# Patient Record
Sex: FEMALE | Race: WHITE | NOT HISPANIC OR LATINO | Employment: OTHER | URBAN - NONMETROPOLITAN AREA
[De-identification: names, ages, dates, MRNs, and addresses within clinical notes are randomized per-mention and may not be internally consistent; named-entity substitution may affect disease eponyms.]

---

## 2017-10-20 ENCOUNTER — ANESTHESIA EVENT (OUTPATIENT)
Dept: PERIOP | Facility: HOSPITAL | Age: 62
End: 2017-10-20

## 2017-10-20 ENCOUNTER — HOSPITAL ENCOUNTER (EMERGENCY)
Facility: HOSPITAL | Age: 62
Discharge: HOME | End: 2017-10-20
Attending: INTERNAL MEDICINE | Admitting: INTERNAL MEDICINE

## 2017-10-20 ENCOUNTER — APPOINTMENT (OUTPATIENT)
Dept: GENERAL RADIOLOGY | Facility: HOSPITAL | Age: 62
End: 2017-10-20

## 2017-10-20 ENCOUNTER — ANESTHESIA (OUTPATIENT)
Dept: PERIOP | Facility: HOSPITAL | Age: 62
End: 2017-10-20

## 2017-10-20 VITALS
SYSTOLIC BLOOD PRESSURE: 110 MMHG | BODY MASS INDEX: 19.33 KG/M2 | HEART RATE: 80 BPM | TEMPERATURE: 98.1 F | HEIGHT: 65 IN | RESPIRATION RATE: 16 BRPM | WEIGHT: 116 LBS | OXYGEN SATURATION: 95 % | DIASTOLIC BLOOD PRESSURE: 80 MMHG

## 2017-10-20 DIAGNOSIS — T18.128A FOOD IMPACTION OF ESOPHAGUS, INITIAL ENCOUNTER: Primary | ICD-10-CM

## 2017-10-20 LAB
ALBUMIN SERPL-MCNC: 4.6 G/DL (ref 3.5–5)
ALBUMIN/GLOB SERPL: 1.4 G/DL (ref 1.1–2.5)
ALP SERPL-CCNC: 110 U/L (ref 24–120)
ALT SERPL W P-5'-P-CCNC: 34 U/L (ref 0–54)
ANION GAP SERPL CALCULATED.3IONS-SCNC: 16 MMOL/L (ref 4–13)
AST SERPL-CCNC: 30 U/L (ref 7–45)
BASOPHILS # BLD AUTO: 0.12 10*3/MM3 (ref 0–0.2)
BASOPHILS NFR BLD AUTO: 0.8 % (ref 0–2)
BILIRUB SERPL-MCNC: 0.2 MG/DL (ref 0.1–1)
BUN BLD-MCNC: 14 MG/DL (ref 5–21)
BUN/CREAT SERPL: 17.5 (ref 7–25)
CALCIUM SPEC-SCNC: 8.9 MG/DL (ref 8.4–10.4)
CHLORIDE SERPL-SCNC: 102 MMOL/L (ref 98–110)
CO2 SERPL-SCNC: 25 MMOL/L (ref 24–31)
CREAT BLD-MCNC: 0.8 MG/DL (ref 0.5–1.4)
DEPRECATED RDW RBC AUTO: 51.5 FL (ref 40–54)
EOSINOPHIL # BLD AUTO: 0.26 10*3/MM3 (ref 0–0.7)
EOSINOPHIL NFR BLD AUTO: 1.7 % (ref 0–4)
ERYTHROCYTE [DISTWIDTH] IN BLOOD BY AUTOMATED COUNT: 15.3 % (ref 12–15)
GFR SERPL CREATININE-BSD FRML MDRD: 73 ML/MIN/1.73
GLOBULIN UR ELPH-MCNC: 3.4 GM/DL
GLUCOSE BLD-MCNC: 103 MG/DL (ref 70–100)
HCT VFR BLD AUTO: 37.1 % (ref 37–47)
HGB BLD-MCNC: 12 G/DL (ref 12–16)
IMM GRANULOCYTES # BLD: 0.05 10*3/MM3 (ref 0–0.03)
IMM GRANULOCYTES NFR BLD: 0.3 % (ref 0–5)
LYMPHOCYTES # BLD AUTO: 4.82 10*3/MM3 (ref 0.72–4.86)
LYMPHOCYTES NFR BLD AUTO: 31.1 % (ref 15–45)
MCH RBC QN AUTO: 29.7 PG (ref 28–32)
MCHC RBC AUTO-ENTMCNC: 32.3 G/DL (ref 33–36)
MCV RBC AUTO: 91.8 FL (ref 82–98)
MONOCYTES # BLD AUTO: 1.15 10*3/MM3 (ref 0.19–1.3)
MONOCYTES NFR BLD AUTO: 7.4 % (ref 4–12)
NEUTROPHILS # BLD AUTO: 9.09 10*3/MM3 (ref 1.87–8.4)
NEUTROPHILS NFR BLD AUTO: 58.7 % (ref 39–78)
NRBC BLD MANUAL-RTO: 0 /100 WBC (ref 0–0)
PLATELET # BLD AUTO: 685 10*3/MM3 (ref 130–400)
PMV BLD AUTO: 9.5 FL (ref 6–12)
POTASSIUM BLD-SCNC: 4 MMOL/L (ref 3.5–5.3)
PROT SERPL-MCNC: 8 G/DL (ref 6.3–8.7)
RBC # BLD AUTO: 4.04 10*6/MM3 (ref 4.2–5.4)
RBC MORPH BLD: NORMAL
SMALL PLATELETS BLD QL SMEAR: NORMAL
SODIUM BLD-SCNC: 143 MMOL/L (ref 135–145)
WBC MORPH BLD: NORMAL
WBC NRBC COR # BLD: 15.49 10*3/MM3 (ref 4.8–10.8)

## 2017-10-20 PROCEDURE — 25010000002 MIDAZOLAM PER 1 MG: Performed by: ANESTHESIOLOGY

## 2017-10-20 PROCEDURE — 25010000002 GLUCAGON (HUMAN RECOMBINANT) 1 MG RECONSTITUTED SOLUTION: Performed by: PHYSICIAN ASSISTANT

## 2017-10-20 PROCEDURE — 85007 BL SMEAR W/DIFF WBC COUNT: CPT | Performed by: EMERGENCY MEDICINE

## 2017-10-20 PROCEDURE — 25010000002 FENTANYL CITRATE (PF) 100 MCG/2ML SOLUTION: Performed by: NURSE ANESTHETIST, CERTIFIED REGISTERED

## 2017-10-20 PROCEDURE — 43247 EGD REMOVE FOREIGN BODY: CPT | Performed by: INTERNAL MEDICINE

## 2017-10-20 PROCEDURE — 96374 THER/PROPH/DIAG INJ IV PUSH: CPT

## 2017-10-20 PROCEDURE — 85025 COMPLETE CBC W/AUTO DIFF WBC: CPT | Performed by: EMERGENCY MEDICINE

## 2017-10-20 PROCEDURE — 25010000002 PROPOFOL 10 MG/ML EMULSION: Performed by: NURSE ANESTHETIST, CERTIFIED REGISTERED

## 2017-10-20 PROCEDURE — 80053 COMPREHEN METABOLIC PANEL: CPT | Performed by: EMERGENCY MEDICINE

## 2017-10-20 PROCEDURE — 25010000002 DIAZEPAM PER 5 MG: Performed by: EMERGENCY MEDICINE

## 2017-10-20 PROCEDURE — 96375 TX/PRO/DX INJ NEW DRUG ADDON: CPT

## 2017-10-20 PROCEDURE — 25010000002 ONDANSETRON PER 1 MG: Performed by: EMERGENCY MEDICINE

## 2017-10-20 PROCEDURE — 25010000002 SUCCINYLCHOLINE PER 20 MG: Performed by: NURSE ANESTHETIST, CERTIFIED REGISTERED

## 2017-10-20 PROCEDURE — 99283 EMERGENCY DEPT VISIT LOW MDM: CPT

## 2017-10-20 PROCEDURE — 71010 HC CHEST PA OR AP: CPT

## 2017-10-20 RX ORDER — ONDANSETRON 2 MG/ML
4 INJECTION INTRAMUSCULAR; INTRAVENOUS AS NEEDED
Status: DISCONTINUED | OUTPATIENT
Start: 2017-10-20 | End: 2017-10-20 | Stop reason: HOSPADM

## 2017-10-20 RX ORDER — ONDANSETRON 2 MG/ML
4 INJECTION INTRAMUSCULAR; INTRAVENOUS EVERY 6 HOURS PRN
Status: DISCONTINUED | OUTPATIENT
Start: 2017-10-20 | End: 2017-10-20 | Stop reason: HOSPADM

## 2017-10-20 RX ORDER — FENTANYL CITRATE 50 UG/ML
INJECTION, SOLUTION INTRAMUSCULAR; INTRAVENOUS AS NEEDED
Status: DISCONTINUED | OUTPATIENT
Start: 2017-10-20 | End: 2017-10-20 | Stop reason: SURG

## 2017-10-20 RX ORDER — LIDOCAINE HYDROCHLORIDE 20 MG/ML
INJECTION, SOLUTION INFILTRATION; PERINEURAL AS NEEDED
Status: DISCONTINUED | OUTPATIENT
Start: 2017-10-20 | End: 2017-10-20 | Stop reason: SURG

## 2017-10-20 RX ORDER — SODIUM CHLORIDE, SODIUM LACTATE, POTASSIUM CHLORIDE, CALCIUM CHLORIDE 600; 310; 30; 20 MG/100ML; MG/100ML; MG/100ML; MG/100ML
100 INJECTION, SOLUTION INTRAVENOUS CONTINUOUS
Status: DISCONTINUED | OUTPATIENT
Start: 2017-10-20 | End: 2017-10-20 | Stop reason: HOSPADM

## 2017-10-20 RX ORDER — HYDRALAZINE HYDROCHLORIDE 20 MG/ML
5 INJECTION INTRAMUSCULAR; INTRAVENOUS
Status: DISCONTINUED | OUTPATIENT
Start: 2017-10-20 | End: 2017-10-20 | Stop reason: HOSPADM

## 2017-10-20 RX ORDER — LABETALOL HYDROCHLORIDE 5 MG/ML
5 INJECTION, SOLUTION INTRAVENOUS
Status: DISCONTINUED | OUTPATIENT
Start: 2017-10-20 | End: 2017-10-20 | Stop reason: HOSPADM

## 2017-10-20 RX ORDER — MEPERIDINE HYDROCHLORIDE 25 MG/ML
12.5 INJECTION INTRAMUSCULAR; INTRAVENOUS; SUBCUTANEOUS
Status: DISCONTINUED | OUTPATIENT
Start: 2017-10-20 | End: 2017-10-20 | Stop reason: HOSPADM

## 2017-10-20 RX ORDER — IPRATROPIUM BROMIDE AND ALBUTEROL SULFATE 2.5; .5 MG/3ML; MG/3ML
3 SOLUTION RESPIRATORY (INHALATION) ONCE AS NEEDED
Status: DISCONTINUED | OUTPATIENT
Start: 2017-10-20 | End: 2017-10-20 | Stop reason: HOSPADM

## 2017-10-20 RX ORDER — METOCLOPRAMIDE HYDROCHLORIDE 5 MG/ML
5 INJECTION INTRAMUSCULAR; INTRAVENOUS
Status: DISCONTINUED | OUTPATIENT
Start: 2017-10-20 | End: 2017-10-20 | Stop reason: HOSPADM

## 2017-10-20 RX ORDER — FLUMAZENIL 0.1 MG/ML
0.2 INJECTION INTRAVENOUS AS NEEDED
Status: DISCONTINUED | OUTPATIENT
Start: 2017-10-20 | End: 2017-10-20 | Stop reason: HOSPADM

## 2017-10-20 RX ORDER — DIAZEPAM 5 MG/ML
5 INJECTION, SOLUTION INTRAMUSCULAR; INTRAVENOUS ONCE
Status: COMPLETED | OUTPATIENT
Start: 2017-10-20 | End: 2017-10-20

## 2017-10-20 RX ORDER — PROPOFOL 10 MG/ML
VIAL (ML) INTRAVENOUS AS NEEDED
Status: DISCONTINUED | OUTPATIENT
Start: 2017-10-20 | End: 2017-10-20 | Stop reason: SURG

## 2017-10-20 RX ORDER — SUCCINYLCHOLINE CHLORIDE 20 MG/ML
INJECTION INTRAMUSCULAR; INTRAVENOUS AS NEEDED
Status: DISCONTINUED | OUTPATIENT
Start: 2017-10-20 | End: 2017-10-20 | Stop reason: SURG

## 2017-10-20 RX ORDER — MIDAZOLAM HYDROCHLORIDE 1 MG/ML
1 INJECTION INTRAMUSCULAR; INTRAVENOUS
Status: DISCONTINUED | OUTPATIENT
Start: 2017-10-20 | End: 2017-10-20 | Stop reason: HOSPADM

## 2017-10-20 RX ORDER — NALOXONE HCL 0.4 MG/ML
0.04 VIAL (ML) INJECTION AS NEEDED
Status: DISCONTINUED | OUTPATIENT
Start: 2017-10-20 | End: 2017-10-20 | Stop reason: HOSPADM

## 2017-10-20 RX ORDER — SODIUM CHLORIDE 9 MG/ML
125 INJECTION, SOLUTION INTRAVENOUS CONTINUOUS
Status: DISCONTINUED | OUTPATIENT
Start: 2017-10-20 | End: 2017-10-20 | Stop reason: HOSPADM

## 2017-10-20 RX ORDER — MIDAZOLAM HYDROCHLORIDE 1 MG/ML
2 INJECTION INTRAMUSCULAR; INTRAVENOUS
Status: DISCONTINUED | OUTPATIENT
Start: 2017-10-20 | End: 2017-10-20 | Stop reason: HOSPADM

## 2017-10-20 RX ORDER — ROCURONIUM BROMIDE 10 MG/ML
INJECTION, SOLUTION INTRAVENOUS AS NEEDED
Status: DISCONTINUED | OUTPATIENT
Start: 2017-10-20 | End: 2017-10-20 | Stop reason: SURG

## 2017-10-20 RX ORDER — MORPHINE SULFATE 2 MG/ML
2 INJECTION, SOLUTION INTRAMUSCULAR; INTRAVENOUS AS NEEDED
Status: DISCONTINUED | OUTPATIENT
Start: 2017-10-20 | End: 2017-10-20 | Stop reason: HOSPADM

## 2017-10-20 RX ORDER — SODIUM CHLORIDE 0.9 % (FLUSH) 0.9 %
1-10 SYRINGE (ML) INJECTION AS NEEDED
Status: DISCONTINUED | OUTPATIENT
Start: 2017-10-20 | End: 2017-10-20 | Stop reason: HOSPADM

## 2017-10-20 RX ADMIN — LIDOCAINE HYDROCHLORIDE 100 MG: 20 INJECTION, SOLUTION INFILTRATION; PERINEURAL at 16:06

## 2017-10-20 RX ADMIN — MIDAZOLAM HYDROCHLORIDE 2 MG: 1 INJECTION, SOLUTION INTRAMUSCULAR; INTRAVENOUS at 15:54

## 2017-10-20 RX ADMIN — ROCURONIUM BROMIDE 5 MG: 10 INJECTION INTRAVENOUS at 16:06

## 2017-10-20 RX ADMIN — SODIUM CHLORIDE, POTASSIUM CHLORIDE, SODIUM LACTATE AND CALCIUM CHLORIDE 100 ML/HR: 600; 310; 30; 20 INJECTION, SOLUTION INTRAVENOUS at 15:20

## 2017-10-20 RX ADMIN — ONDANSETRON 4 MG: 2 INJECTION INTRAMUSCULAR; INTRAVENOUS at 14:42

## 2017-10-20 RX ADMIN — GLUCAGON HYDROCHLORIDE 1 MG: 1 INJECTION, POWDER, FOR SOLUTION INTRAMUSCULAR; INTRAVENOUS; SUBCUTANEOUS at 14:46

## 2017-10-20 RX ADMIN — DIAZEPAM 5 MG: 5 INJECTION, SOLUTION INTRAMUSCULAR; INTRAVENOUS at 14:46

## 2017-10-20 RX ADMIN — SODIUM CHLORIDE 125 ML/HR: 9 INJECTION, SOLUTION INTRAVENOUS at 14:41

## 2017-10-20 RX ADMIN — SUCCINYLCHOLINE CHLORIDE 120 MG: 20 INJECTION, SOLUTION INTRAMUSCULAR; INTRAVENOUS at 16:06

## 2017-10-20 RX ADMIN — PROPOFOL 140 MG: 10 INJECTION, EMULSION INTRAVENOUS at 16:06

## 2017-10-20 RX ADMIN — FENTANYL CITRATE 100 MCG: 50 INJECTION, SOLUTION INTRAMUSCULAR; INTRAVENOUS at 16:06

## 2017-10-20 NOTE — H&P
"[facility] Gastroenterology  Pre Procedure History & Physical    Chief Complaint:   Dysphagia    Subjective     HPI:   Patient here today with complaints of dysphagia.  A some chicken earlier in the day and has been unable to swallow since that time.  Has not had prior episodes of dysphagia.  Here for urgent endoscopy and foreign body removal    Past Medical History:   No past medical history on file.    Past Surgical History:  [unfilled]    Family History:  No family history on file.    Social History:       Medications:   Prior to Admission medications    Not on File       Allergies:  Review of patient's allergies indicates no known allergies.    Objective     Blood pressure 141/89, pulse 104, temperature 98.3 °F (36.8 °C), temperature source Oral, resp. rate 18, height 65\" (165.1 cm), weight 116 lb (52.6 kg), SpO2 100 %.    Physical Exam   Constitutional: Pt is oriented to person, place, and in no distress.   HENT: Mouth/Throat: Oropharynx is clear.   Cardiovascular: Normal rate, regular rhythm.    Pulmonary/Chest: Effort normal. No respiratory distress. No  wheezes.   Abdominal: Soft. Non-distended.  Skin: Skin is warm and dry.   Psychiatric: Mood, memory, affect and judgment appear normal.     Assessment/Plan     Diagnosis:  Dysphagia related to foreign body    Anticipated Surgical Procedure:    Proceed with urgent endoscopy    The following major R/B/A were discussed with the patient, however the list is not all inclusive . Risk:  Bleeding (immediate and delayed), perforation (rupture or tear), reaction to medication, missed lesion/cancer, pain during the procedure, infection, need for surgery, need for ostomy, need for mechanical ventilation (breathing machine), death.  Benefits: removal of polyp/tissue, burn/clip/or inject to stop bleeding, removal of foreign body, dilate any stricture.  Alternatives: Xray or CT, surgery, do nothing with associated risk   The patient was given time to ask question and " received explanation, and agrees to proceed as per History and Physical.   No guarantee given or expressed.    EMR Dragon/transcription disclaimer: Much of this encounter note is an electronic transcription/translation of spoken language to printed text.  The electronic translation of spoken language may permit erroneous, or at times, nonsensical words or phrases to be inadvertently transcribed.  Although I have reviewed the note for such errors, some may still exist.    Femi Goddard MD  3:49 PM  10/20/2017

## 2017-10-20 NOTE — ED PROVIDER NOTES
"Subjective   Patient is a 62 y.o. female presenting with foreign body.   Foreign Body   Associated symptoms: choking, cough and sore throat      Patient is a 62-year-old  female chief complaint of \"chicken stuck in my throat.\"  The patient describes eating a chicken 10 minutes prior to ER arrival.  She ate a piece of chicken at work when it suddenly got stuck.  She did try to benedicto it with Water but vomited right back up.  She has been coughing ever since.  She does have a history of COPD but does not wear any home oxygenation.  She denies any history of food impaction or esophageal strictures in the past.  She describes she is otherwise healthy.    Review of Systems   Constitutional: Positive for activity change and appetite change.   HENT: Positive for sore throat.    Respiratory: Positive for cough and choking. Negative for shortness of breath.    Cardiovascular: Negative.    Genitourinary: Negative.    Musculoskeletal: Negative.    Neurological: Negative.    Psychiatric/Behavioral: Negative.        No past medical history on file.    No Known Allergies    No past surgical history on file.    No family history on file.    Social History     Social History   • Marital status: Single     Spouse name: N/A   • Number of children: N/A   • Years of education: N/A     Social History Main Topics   • Smoking status: Not on file   • Smokeless tobacco: Not on file   • Alcohol use Not on file   • Drug use: Not on file   • Sexual activity: Not on file      Comment: recieved partient from the ED dept . No history with chart      Other Topics Concern   • Not on file     Social History Narrative   • No narrative on file       Prior to Admission medications    Not on File       Medications   ondansetron (ZOFRAN) injection 4 mg ( Intravenous MAR Hold 10/20/17 1516)   sodium chloride 0.9 % infusion (125 mL/hr Intravenous New Bag 10/20/17 1441)   lactated ringers infusion (100 mL/hr Intravenous New Bag 10/20/17 1520) " "  Morphine sulfate (PF) injection 2 mg (not administered)   HYDROmorphone (DILAUDID) injection 0.5 mg (not administered)   labetalol (NORMODYNE,TRANDATE) injection 5 mg (not administered)   hydrALAZINE (APRESOLINE) injection 5 mg (not administered)   atropine sulfate injection 0.5 mg (not administered)   ipratropium-albuterol (DUO-NEB) nebulizer solution 3 mL (not administered)   naloxone (NARCAN) injection 0.04 mg (not administered)   flumazenil (ROMAZICON) injection 0.2 mg (not administered)   ondansetron (ZOFRAN) injection 4 mg (not administered)   metoclopramide (REGLAN) injection 5 mg (not administered)   meperidine (DEMEROL) injection 12.5 mg (not administered)   diazePAM (VALIUM) injection 5 mg (5 mg Intravenous Given 10/20/17 1446)   glucagon (human recombinant) (GLUCAGEN DIAGNOSTIC) injection 1 mg (1 mg Intravenous Given 10/20/17 1446)       /61  Pulse 90  Temp 98 °F (36.7 °C)  Resp 16  Ht 65\" (165.1 cm)  Wt 116 lb (52.6 kg)  SpO2 100%  BMI 19.3 kg/m2      Objective   Physical Exam   Constitutional: She is oriented to person, place, and time. She appears well-developed and well-nourished.  Non-toxic appearance. No distress.   HENT:   Head: Normocephalic and atraumatic.   Nose: Nose normal.   Mouth/Throat: Uvula is midline, oropharynx is clear and moist and mucous membranes are normal.   Eyes: Conjunctivae, EOM and lids are normal. Pupils are equal, round, and reactive to light. Lids are everted and swept, no foreign bodies found.   Neck: Trachea normal, normal range of motion, full passive range of motion without pain and phonation normal. Neck supple. Normal carotid pulses and no JVD present. Carotid bruit is not present. No rigidity.   Cardiovascular: Normal rate, regular rhythm, normal heart sounds, intact distal pulses and normal pulses.    Pulmonary/Chest: Effort normal. No stridor. No apnea and no tachypnea. No respiratory distress. She has wheezes. She has rales.   Abdominal: Soft. " Normal appearance, normal aorta and bowel sounds are normal. There is no hepatosplenomegaly. There is no tenderness. No hernia.   Musculoskeletal: Normal range of motion.       Vascular Status -  Her exam exhibits right foot vasculature normal. Her exam exhibits no right foot edema. Her exam exhibits left foot vasculature normal. Her exam exhibits no left foot edema.  Neurological: She is alert and oriented to person, place, and time. She has normal strength and normal reflexes. She displays normal reflexes. No cranial nerve deficit or sensory deficit. Coordination and gait normal. GCS eye subscore is 4. GCS verbal subscore is 5. GCS motor subscore is 6.   Skin: Skin is warm, dry and intact. She is not diaphoretic. No pallor.   Psychiatric: She has a normal mood and affect. Her speech is normal and behavior is normal.   Nursing note and vitals reviewed.      Procedures         Lab Results (last 24 hours)     Procedure Component Value Units Date/Time    CBC & Differential [[id number]] Collected:  10/20/17 1438    Specimen:  Blood Updated:  10/20/17 1541    Narrative:       The following orders were created for panel order CBC & Differential.  Procedure                               Abnormality         Status                     ---------                               -----------         ------                     Scan Slide[[id number]]                                       Final result               CBC Auto Differential[[id number]]         Abnormal            Final result                 Please view results for these tests on the individual orders.    CBC Auto Differential [[id number]]  (Abnormal) Collected:  10/20/17 1438    Specimen:  Blood Updated:  10/20/17 1541     WBC 15.49 (H) 10*3/mm3      RBC 4.04 (L) 10*6/mm3      Hemoglobin 12.0 g/dL      Hematocrit 37.1 %      MCV 91.8 fL      MCH 29.7 pg      MCHC 32.3 (L) g/dL      RDW 15.3 (H) %      RDW-SD 51.5 fl      MPV 9.5 fL      Platelets 685 (H) 10*3/mm3       Neutrophil % 58.7 %      Lymphocyte % 31.1 %      Monocyte % 7.4 %      Eosinophil % 1.7 %      Basophil % 0.8 %      Immature Grans % 0.3 %      Neutrophils, Absolute 9.09 (H) 10*3/mm3      Lymphocytes, Absolute 4.82 10*3/mm3      Monocytes, Absolute 1.15 10*3/mm3      Eosinophils, Absolute 0.26 10*3/mm3      Basophils, Absolute 0.12 10*3/mm3      Immature Grans, Absolute 0.05 (H) 10*3/mm3      nRBC 0.0 /100 WBC     Scan Slide [[id number]] Collected:  10/20/17 1438    Specimen:  Blood Updated:  10/20/17 1541     RBC Morphology Normal     WBC Morphology Normal     Platelet Estimate Increased    Comprehensive Metabolic Panel [[id number]]  (Abnormal) Collected:  10/20/17 1439    Specimen:  Blood Updated:  10/20/17 1504     Glucose 103 (H) mg/dL      BUN 14 mg/dL      Creatinine 0.80 mg/dL      Sodium 143 mmol/L      Potassium 4.0 mmol/L      Chloride 102 mmol/L      CO2 25.0 mmol/L      Calcium 8.9 mg/dL      Total Protein 8.0 g/dL      Albumin 4.60 g/dL      ALT (SGPT) 34 U/L      AST (SGOT) 30 U/L      Alkaline Phosphatase 110 U/L      Total Bilirubin 0.2 mg/dL      eGFR Non African Amer 73 mL/min/1.73      Globulin 3.4 gm/dL      A/G Ratio 1.4 g/dL      BUN/Creatinine Ratio 17.5     Anion Gap 16.0 (H) mmol/L           Xr Chest 1 View    Result Date: 10/20/2017  Narrative: History: 62-year-old female evaluated for food bolus.  Reference: Chest radiograph November 2015  Findings: Frontal chest radiograph performed.  Nodular opacities over both lung bases were present previously and represent nipple shadows. Lungs are felt to be clear. No pleural effusion or pneumothorax. Heart and mediastinum unremarkable. No acute osseous findings.       Impression: Impression: No acute findings. This report was finalized on 10/20/2017 15:02 by  Brendan Edmondson, .      ED Course  ED Course        I reviewed this case immediately with Dr. Mi and called JUAN FRANCISCO Hoyos on call. I have spoken with Kori who will speak with Dr. Goddard  and the endoscopy team . She requests consent be obtained for endoscopy with mac. I have updated patient on plan of care.     MDM    Final diagnoses:   Food impaction of esophagus, initial encounter          Thu-CHERYL Montalvo  10/20/17 9961

## 2017-10-20 NOTE — DISCHARGE INSTRUCTIONS

## 2017-10-20 NOTE — ANESTHESIA PREPROCEDURE EVALUATION
Anesthesia Evaluation     no history of anesthetic complications:  NPO Solid Status: Waived due to emergency  NPO Liquid Status: Waived due to emergency     Airway   Mallampati: II  TM distance: >3 FB  Neck ROM: full  no difficulty expected  Dental    (+) poor dentition        Pulmonary - normal exam    breath sounds clear to auscultation  (+) a smoker (1 ppd) Current, COPD,   (-) asthma, recent URI, sleep apnea  Cardiovascular   Exercise tolerance: good (4-7 METS)    Rhythm: regular  Rate: normal    (-) pacemaker, hypertension, past MI, angina, cardiac stents, CABG      Neuro/Psych  (-) seizures, TIA, CVA  GI/Hepatic/Renal/Endo    (+)  GERD,   (-) liver disease, no renal disease, diabetes, hypothyroidism, hyperthyroidism    Musculoskeletal     Abdominal    Substance History      OB/GYN          Other                                        Anesthesia Plan    ASA 3     general     intravenous induction   Anesthetic plan and risks discussed with patient.

## 2017-10-20 NOTE — NURSING NOTE
Called the patient place of employment [facility] [employer] and spoke with manager whom confirmed that  Kori Fonseca will be here to give patient a ride home

## 2017-10-20 NOTE — ANESTHESIA PROCEDURE NOTES
Airway  Urgency: elective    Airway not difficult    General Information and Staff    Patient location during procedure: OR  CRNA: ASHLI PINEDA    Indications and Patient Condition  Indications for airway management: airway protection    Preoxygenated: yes  Mask difficulty assessment: 1 - vent by mask    Final Airway Details  Final airway type: endotracheal airway      Successful airway: ETT  Cuffed: yes   Successful intubation technique: direct laryngoscopy  Facilitating devices/methods: intubating stylet  Endotracheal tube insertion site: oral  Blade: Riggs  Blade size: #2  ETT size: 7.5 mm  Cormack-Lehane Classification: grade IIa - partial view of glottis  Placement verified by: chest auscultation and capnometry   Cuff volume (mL): 8  Measured from: lips  Number of attempts at approach: 1    Additional Comments  Atraumatic intubation

## 2017-10-22 NOTE — ANESTHESIA POSTPROCEDURE EVALUATION
"Patient: Tea Gloster    Procedure Summary     Date Anesthesia Start Anesthesia Stop Room / Location    10/20/17 9690 1640  [facility] OR 03 / [facility] OR       Procedure Diagnosis Surgeon Provider    ESOPHAGOGASTRODUODENOSCOPY WITH ANESTHESIA (N/A Esophagus) No diagnosis on file. MD Yung Camacho CRNA          Anesthesia Type: general  Last vitals  BP   110/80 (10/20/17 1816)   Temp   98.1 °F (36.7 °C) (10/20/17 1732)   Pulse   80 (10/20/17 1816)   Resp   16 (10/20/17 1816)     SpO2   95 % (10/20/17 1816)     Post Anesthesia Care and Evaluation      Comments: Patient discharged from PACU prior to anesthesia evaluation based on Franchesca Score.  For details, see RN note.     /80  Pulse 80  Temp 98.1 °F (36.7 °C) (Temporal Artery )   Resp 16  Ht 65\" (165.1 cm)  Wt 116 lb (52.6 kg)  SpO2 95%  BMI 19.3 kg/m2      "

## 2018-05-26 ENCOUNTER — APPOINTMENT (OUTPATIENT)
Dept: CT IMAGING | Facility: HOSPITAL | Age: 63
End: 2018-05-26

## 2018-05-26 ENCOUNTER — HOSPITAL ENCOUNTER (EMERGENCY)
Facility: HOSPITAL | Age: 63
Discharge: HOME | End: 2018-05-26
Admitting: EMERGENCY MEDICINE

## 2018-05-26 ENCOUNTER — APPOINTMENT (OUTPATIENT)
Dept: GENERAL RADIOLOGY | Facility: HOSPITAL | Age: 63
End: 2018-05-26

## 2018-05-26 VITALS
SYSTOLIC BLOOD PRESSURE: 124 MMHG | OXYGEN SATURATION: 98 % | HEART RATE: 78 BPM | TEMPERATURE: 97.8 F | HEIGHT: 65 IN | RESPIRATION RATE: 16 BRPM | DIASTOLIC BLOOD PRESSURE: 65 MMHG | WEIGHT: 116 LBS | BODY MASS INDEX: 19.33 KG/M2

## 2018-05-26 DIAGNOSIS — S70.01XA CONTUSION OF RIGHT HIP, INITIAL ENCOUNTER: Primary | ICD-10-CM

## 2018-05-26 PROCEDURE — 73502 X-RAY EXAM HIP UNI 2-3 VIEWS: CPT

## 2018-05-26 PROCEDURE — 99283 EMERGENCY DEPT VISIT LOW MDM: CPT

## 2018-05-26 PROCEDURE — 73700 CT LOWER EXTREMITY W/O DYE: CPT

## 2018-05-26 RX ORDER — NAPROXEN 500 MG/1
500 TABLET ORAL EVERY 12 HOURS PRN
Qty: 20 TABLET | Refills: 0 | Status: ON HOLD | OUTPATIENT
Start: 2018-05-26 | End: 2018-11-06

## 2018-05-26 RX ORDER — HYDROCODONE BITARTRATE AND ACETAMINOPHEN 5; 325 MG/1; MG/1
1 TABLET ORAL ONCE
Status: COMPLETED | OUTPATIENT
Start: 2018-05-26 | End: 2018-05-26

## 2018-05-26 RX ORDER — ONDANSETRON 4 MG/1
4 TABLET, ORALLY DISINTEGRATING ORAL ONCE
Status: COMPLETED | OUTPATIENT
Start: 2018-05-26 | End: 2018-05-26

## 2018-05-26 RX ADMIN — ONDANSETRON 4 MG: 4 TABLET, ORALLY DISINTEGRATING ORAL at 09:34

## 2018-05-26 RX ADMIN — HYDROCODONE BITARTRATE AND ACETAMINOPHEN 1 TABLET: 5; 325 TABLET ORAL at 09:34

## 2018-09-16 ENCOUNTER — APPOINTMENT (OUTPATIENT)
Dept: GENERAL RADIOLOGY | Age: 63
DRG: 194 | End: 2018-09-16

## 2018-09-16 ENCOUNTER — HOSPITAL ENCOUNTER (INPATIENT)
Age: 63
LOS: 1 days | Discharge: HOME | DRG: 194 | End: 2018-09-17
Attending: EMERGENCY MEDICINE | Admitting: HOSPITALIST

## 2018-09-16 DIAGNOSIS — J18.9 PNEUMONIA DUE TO ORGANISM: Primary | ICD-10-CM

## 2018-09-16 DIAGNOSIS — J44.1 COPD EXACERBATION (HCC): ICD-10-CM

## 2018-09-16 DIAGNOSIS — R09.02 HYPOXIA: ICD-10-CM

## 2018-09-16 PROBLEM — J44.9 COPD (CHRONIC OBSTRUCTIVE PULMONARY DISEASE) (HCC): Status: ACTIVE | Noted: 2018-09-16

## 2018-09-16 PROBLEM — D75.839 THROMBOCYTOSIS: Status: ACTIVE | Noted: 2018-09-16

## 2018-09-16 LAB
ALBUMIN SERPL-MCNC: 4.2 G/DL (ref 3.5–5.2)
ALP BLD-CCNC: 132 U/L (ref 35–104)
ALT SERPL-CCNC: 15 U/L (ref 5–33)
ANION GAP SERPL CALCULATED.3IONS-SCNC: 11 MMOL/L (ref 7–19)
AST SERPL-CCNC: 17 U/L (ref 5–32)
BASE EXCESS ARTERIAL: 2.3 MMOL/L (ref -2–2)
BASOPHILS ABSOLUTE: 0.2 K/UL (ref 0–0.2)
BASOPHILS RELATIVE PERCENT: 0.9 % (ref 0–1)
BILIRUB SERPL-MCNC: <0.2 MG/DL (ref 0.2–1.2)
BUN BLDV-MCNC: 8 MG/DL (ref 8–23)
CALCIUM SERPL-MCNC: 9.1 MG/DL (ref 8.8–10.2)
CARBOXYHEMOGLOBIN ARTERIAL: 3.9 % (ref 0–5)
CHLORIDE BLD-SCNC: 100 MMOL/L (ref 98–111)
CO2: 28 MMOL/L (ref 22–29)
CREAT SERPL-MCNC: 0.5 MG/DL (ref 0.5–0.9)
EOSINOPHILS ABSOLUTE: 0.3 K/UL (ref 0–0.6)
EOSINOPHILS RELATIVE PERCENT: 1.2 % (ref 0–5)
GFR NON-AFRICAN AMERICAN: >60
GLUCOSE BLD-MCNC: 77 MG/DL (ref 74–109)
HCO3 ARTERIAL: 27.3 MMOL/L (ref 22–26)
HCT VFR BLD CALC: 40.9 % (ref 37–47)
HEMOGLOBIN, ART, EXTENDED: 13.2 G/DL (ref 12–16)
HEMOGLOBIN: 13 G/DL (ref 12–16)
LACTIC ACID: 1 MMOL/L (ref 0.5–1.9)
LYMPHOCYTES ABSOLUTE: 2.4 K/UL (ref 1.1–4.5)
LYMPHOCYTES RELATIVE PERCENT: 12.1 % (ref 20–40)
MCH RBC QN AUTO: 30.4 PG (ref 27–31)
MCHC RBC AUTO-ENTMCNC: 31.8 G/DL (ref 33–37)
MCV RBC AUTO: 95.8 FL (ref 81–99)
METHEMOGLOBIN ARTERIAL: 0.9 %
MONOCYTES ABSOLUTE: 1.8 K/UL (ref 0–0.9)
MONOCYTES RELATIVE PERCENT: 8.9 % (ref 0–10)
NEUTROPHILS ABSOLUTE: 15.3 K/UL (ref 1.5–7.5)
NEUTROPHILS RELATIVE PERCENT: 76.5 % (ref 50–65)
O2 CONTENT ARTERIAL: 16.5 ML/DL
O2 SAT, ARTERIAL: 89.1 %
O2 THERAPY: ABNORMAL
PCO2 ARTERIAL: 43 MMHG (ref 35–45)
PDW BLD-RTO: 13.2 % (ref 11.5–14.5)
PH ARTERIAL: 7.41 (ref 7.35–7.45)
PLATELET # BLD: 691 K/UL (ref 130–400)
PMV BLD AUTO: 8.9 FL (ref 9.4–12.3)
PO2 ARTERIAL: 58 MMHG (ref 80–100)
POTASSIUM SERPL-SCNC: 3.9 MMOL/L (ref 3.5–5)
POTASSIUM, WHOLE BLOOD: 3.9
RBC # BLD: 4.27 M/UL (ref 4.2–5.4)
SODIUM BLD-SCNC: 139 MMOL/L (ref 136–145)
TOTAL PROTEIN: 7.5 G/DL (ref 6.6–8.7)
TROPONIN: <0.01 NG/ML (ref 0–0.03)
WBC # BLD: 20.1 K/UL (ref 4.8–10.8)

## 2018-09-16 PROCEDURE — 94640 AIRWAY INHALATION TREATMENT: CPT

## 2018-09-16 PROCEDURE — 2580000003 HC RX 258: Performed by: INTERNAL MEDICINE

## 2018-09-16 PROCEDURE — 94644 CONT INHLJ TX 1ST HOUR: CPT

## 2018-09-16 PROCEDURE — 99285 EMERGENCY DEPT VISIT HI MDM: CPT

## 2018-09-16 PROCEDURE — 2700000000 HC OXYGEN THERAPY PER DAY

## 2018-09-16 PROCEDURE — 71046 X-RAY EXAM CHEST 2 VIEWS: CPT

## 2018-09-16 PROCEDURE — 6370000000 HC RX 637 (ALT 250 FOR IP): Performed by: EMERGENCY MEDICINE

## 2018-09-16 PROCEDURE — 99285 EMERGENCY DEPT VISIT HI MDM: CPT | Performed by: EMERGENCY MEDICINE

## 2018-09-16 PROCEDURE — 93005 ELECTROCARDIOGRAM TRACING: CPT

## 2018-09-16 PROCEDURE — 82803 BLOOD GASES ANY COMBINATION: CPT

## 2018-09-16 PROCEDURE — 85025 COMPLETE CBC W/AUTO DIFF WBC: CPT

## 2018-09-16 PROCEDURE — 1210000000 HC MED SURG R&B

## 2018-09-16 PROCEDURE — 36415 COLL VENOUS BLD VENIPUNCTURE: CPT

## 2018-09-16 PROCEDURE — 36600 WITHDRAWAL OF ARTERIAL BLOOD: CPT

## 2018-09-16 PROCEDURE — 80053 COMPREHEN METABOLIC PANEL: CPT

## 2018-09-16 PROCEDURE — 6360000002 HC RX W HCPCS: Performed by: EMERGENCY MEDICINE

## 2018-09-16 PROCEDURE — 84132 ASSAY OF SERUM POTASSIUM: CPT

## 2018-09-16 PROCEDURE — 96374 THER/PROPH/DIAG INJ IV PUSH: CPT

## 2018-09-16 PROCEDURE — 6370000000 HC RX 637 (ALT 250 FOR IP): Performed by: INTERNAL MEDICINE

## 2018-09-16 PROCEDURE — 87040 BLOOD CULTURE FOR BACTERIA: CPT

## 2018-09-16 PROCEDURE — 99222 1ST HOSP IP/OBS MODERATE 55: CPT | Performed by: INTERNAL MEDICINE

## 2018-09-16 PROCEDURE — 84484 ASSAY OF TROPONIN QUANT: CPT

## 2018-09-16 PROCEDURE — 83605 ASSAY OF LACTIC ACID: CPT

## 2018-09-16 RX ORDER — PREDNISONE 20 MG/1
20 TABLET ORAL 2 TIMES DAILY
Status: DISCONTINUED | OUTPATIENT
Start: 2018-09-17 | End: 2018-09-17

## 2018-09-16 RX ORDER — METHYLPREDNISOLONE SODIUM SUCCINATE 125 MG/2ML
125 INJECTION, POWDER, LYOPHILIZED, FOR SOLUTION INTRAMUSCULAR; INTRAVENOUS ONCE
Status: COMPLETED | OUTPATIENT
Start: 2018-09-16 | End: 2018-09-16

## 2018-09-16 RX ORDER — IPRATROPIUM BROMIDE AND ALBUTEROL SULFATE 2.5; .5 MG/3ML; MG/3ML
1 SOLUTION RESPIRATORY (INHALATION)
Status: DISCONTINUED | OUTPATIENT
Start: 2018-09-16 | End: 2018-09-16

## 2018-09-16 RX ORDER — LEVOFLOXACIN 5 MG/ML
500 INJECTION, SOLUTION INTRAVENOUS EVERY 24 HOURS
Status: DISCONTINUED | OUTPATIENT
Start: 2018-09-17 | End: 2018-09-17

## 2018-09-16 RX ORDER — SODIUM CHLORIDE 0.9 % (FLUSH) 0.9 %
10 SYRINGE (ML) INJECTION EVERY 12 HOURS SCHEDULED
Status: DISCONTINUED | OUTPATIENT
Start: 2018-09-16 | End: 2018-09-18 | Stop reason: HOSPADM

## 2018-09-16 RX ORDER — IPRATROPIUM BROMIDE AND ALBUTEROL SULFATE 2.5; .5 MG/3ML; MG/3ML
1 SOLUTION RESPIRATORY (INHALATION)
Status: DISCONTINUED | OUTPATIENT
Start: 2018-09-16 | End: 2018-09-18 | Stop reason: HOSPADM

## 2018-09-16 RX ORDER — IPRATROPIUM BROMIDE AND ALBUTEROL SULFATE 2.5; .5 MG/3ML; MG/3ML
1 SOLUTION RESPIRATORY (INHALATION) ONCE
Status: COMPLETED | OUTPATIENT
Start: 2018-09-16 | End: 2018-09-16

## 2018-09-16 RX ORDER — LEVOFLOXACIN 5 MG/ML
750 INJECTION, SOLUTION INTRAVENOUS ONCE
Status: COMPLETED | OUTPATIENT
Start: 2018-09-16 | End: 2018-09-16

## 2018-09-16 RX ORDER — ONDANSETRON 2 MG/ML
4 INJECTION INTRAMUSCULAR; INTRAVENOUS EVERY 6 HOURS PRN
Status: DISCONTINUED | OUTPATIENT
Start: 2018-09-16 | End: 2018-09-18 | Stop reason: HOSPADM

## 2018-09-16 RX ORDER — SODIUM CHLORIDE 0.9 % (FLUSH) 0.9 %
10 SYRINGE (ML) INJECTION PRN
Status: DISCONTINUED | OUTPATIENT
Start: 2018-09-16 | End: 2018-09-18 | Stop reason: HOSPADM

## 2018-09-16 RX ADMIN — Medication 10 ML: at 19:41

## 2018-09-16 RX ADMIN — LEVOFLOXACIN 750 MG: 5 INJECTION, SOLUTION INTRAVENOUS at 12:59

## 2018-09-16 RX ADMIN — METHYLPREDNISOLONE SODIUM SUCCINATE 125 MG: 125 INJECTION, POWDER, FOR SOLUTION INTRAMUSCULAR; INTRAVENOUS at 11:24

## 2018-09-16 RX ADMIN — IPRATROPIUM BROMIDE AND ALBUTEROL SULFATE 1 AMPULE: .5; 3 SOLUTION RESPIRATORY (INHALATION) at 20:35

## 2018-09-16 RX ADMIN — IPRATROPIUM BROMIDE AND ALBUTEROL SULFATE 1 AMPULE: .5; 3 SOLUTION RESPIRATORY (INHALATION) at 12:17

## 2018-09-16 ASSESSMENT — ENCOUNTER SYMPTOMS
VOMITING: 0
BACK PAIN: 0
RHINORRHEA: 0
DIARRHEA: 0
WHEEZING: 1
NAUSEA: 0
SHORTNESS OF BREATH: 1
ABDOMINAL PAIN: 0
COUGH: 1
SORE THROAT: 0

## 2018-09-16 NOTE — ED PROVIDER NOTES
CURRENT MEDICATIONS       Previous Medications    No medications on file       ALLERGIES     Patient has no known allergies. FAMILY HISTORY     History reviewed. No pertinent family history. SOCIAL HISTORY       Social History     Social History    Marital status:      Spouse name: N/A    Number of children: N/A    Years of education: N/A     Social History Main Topics    Smoking status: Current Some Day Smoker    Smokeless tobacco: None    Alcohol use No    Drug use: Unknown    Sexual activity: Not Asked     Other Topics Concern    None     Social History Narrative    None       SCREENINGS             PHYSICAL EXAM    (up to 7 for level 4, 8 or more for level 5)     ED Triage Vitals [09/16/18 1024]   BP Temp Temp Source Pulse Resp SpO2 Height Weight   129/75 99.1 °F (37.3 °C) Temporal 111 24 (!) 88 % 5' 5\" (1.651 m) 105 lb (47.6 kg)       Physical Exam   Constitutional: She is oriented to person, place, and time. She appears well-developed and well-nourished. No distress. HENT:   Head: Normocephalic and atraumatic. Eyes: Pupils are equal, round, and reactive to light. Neck: Normal range of motion. Neck supple. Cardiovascular: Normal rate, regular rhythm, normal heart sounds and intact distal pulses. Pulmonary/Chest: Effort normal. No respiratory distress. She has wheezes. Abdominal: Soft. Bowel sounds are normal. She exhibits no distension. There is no tenderness. Musculoskeletal: Normal range of motion. She exhibits no edema. Neurological: She is alert and oriented to person, place, and time. No cranial nerve deficit. She exhibits normal muscle tone. Coordination normal.   Skin: Skin is warm and dry. No rash noted. She is not diaphoretic. Psychiatric: She has a normal mood and affect. Her behavior is normal.   Nursing note and vitals reviewed.       DIAGNOSTIC RESULTS     EKG: All EKG's are interpreted by the Emergency Department Physician who either signs or Co-signs this chart in the absence of a cardiologist.  Normal sinus rhythm rate 76 no acute abnormality    RADIOLOGY:   Non-plain film images such as CT, Ultrasound and MRI are read by the radiologist. Plain radiographic images are visualized and preliminarily interpreted by the emergency physician with the below findings:    Interpretation per the Radiologist below, if available at the time of this note:    XR CHEST STANDARD (2 VW)   Final Result   1. Streaky bibasilar opacities could represent infection, aspiration   or atelectasis. Signed by Dr Day Parikh on 9/16/2018 11:43 AM            ED BEDSIDE ULTRASOUND:   Performed by ED Physician - none    LABS:  Labs Reviewed   CBC WITH AUTO DIFFERENTIAL - Abnormal; Notable for the following:        Result Value    WBC 20.1 (*)     MCHC 31.8 (*)     Platelets 573 (*)     MPV 8.9 (*)     Neutrophils % 76.5 (*)     Lymphocytes % 12.1 (*)     Neutrophils # 15.3 (*)     Monocytes # 1.80 (*)     All other components within normal limits    Narrative:     NOTIFIED NOHEMI IN ED TO COLLECT PURPLE TOP FOR CBC   09/16/2018  11:16 CC   COMPREHENSIVE METABOLIC PANEL - Abnormal; Notable for the following:     Alkaline Phosphatase 132 (*)     All other components within normal limits    Narrative:     NOTIFIED NOHEMI IN ED TO COLLECT PURPLE TOP FOR CBC   09/16/2018  11:16 CC   BLOOD GAS, ARTERIAL - Abnormal; Notable for the following:     pO2, Arterial 58.0 (*)     HCO3, Arterial 27.3 (*)     Base Excess, Arterial 2.3 (*)     O2 Sat, Arterial 89.1 (*)     All other components within normal limits   CULTURE BLOOD #1   CULTURE BLOOD #2   TROPONIN    Narrative:     NOTIFIED NOHEMI IN ED TO COLLECT PURPLE TOP FOR CBC   09/16/2018  11:16 CC   POTASSIUM, WHOLE BLOOD   LACTIC ACID, PLASMA       All other labs were within normal range or not returned as of this dictation.     EMERGENCY DEPARTMENT COURSE and DIFFERENTIAL DIAGNOSIS/MDM:   Vitals:    Vitals:    09/16/18 1024   BP: 129/75   Pulse: 111   Resp: 24   Temp: 99.1 °F (37.3 °C)   TempSrc: Temporal   SpO2: (!) 88%   Weight: 105 lb (47.6 kg)   Height: 5' 5\" (1.651 m)       [facility]  D/w Dr Noni Mansfield for admission. Pt still soa and wheezing after first round of nebs, hour long albuterol ordered    CONSULTS:  None    PROCEDURES:  Unless otherwise noted below, none     Procedures    FINAL IMPRESSION      1. Pneumonia due to organism    2. COPD exacerbation ([facility] [address]    3.  Hypoxia          DISPOSITION/PLAN   DISPOSITION        PATIENT REFERRED TO:  Chloé Sharma, [address]  [phone]            DISCHARGE MEDICATIONS:  New Prescriptions    No medications on file          (Please note that portions of this note were completed with a voice recognition program.  Efforts were made to edit the dictations but occasionally words are mis-transcribed.)    Violet Urbano MD (electronically signed)  Attending Emergency Physician         Violet Urbano MD  09/16/18 7764 Renea Mcpherson MD  09/16/18 1312

## 2018-09-16 NOTE — PROGRESS NOTES
/16/2018 12:32 PM - RussellDae Incoming Lab Results From [facility]     Component Results     Component Value Ref Range & Units Status Collected Lab   pH, Arterial 7.410  7.350 - 7.450 Final 09/16/2018 12:31 PM [address] [place] [address] Lab   pCO2, Arterial 43.0  35.0 - 45.0 mmHg Final 09/16/2018 12:31 PM [facility] Lab   pO2, Arterial 58.0   80.0 - 100.0 mmHg Final 09/16/2018 12:31 PM [facility] Lab   HCO3, Arterial 27.3   22.0 - 26.0 mmol/L Final 09/16/2018 12:31 PM [facility] Excess, Arterial 2.3   -2.0 - 2.0 mmol/L Final 09/16/2018 12:31 PM [address] [place] [address] Lab   Hemoglobin, Art, Extended 13.2  12.0 - 16.0 g/dL Final 09/16/2018 12:31 PM [address] [place] [address] Lab   O2 Sat, Arterial 89.1   >92 % Final 09/16/2018 12:31 PM [facility] Lab   Carboxyhgb, Arterial 3.9  0.0 - 5.0 % Final 09/16/2018 12:31 PM [facility] Lab        0.0-1.5   (Smokers 1.5-5.0)    Methemoglobin, Arterial 0.9  <1.5 % Final 09/16/2018 12:31 PM [address] [place] [address] Lab   O2 Content, Arterial 16.5          Pt on ra, rr, AT+

## 2018-09-17 ENCOUNTER — APPOINTMENT (OUTPATIENT)
Dept: GENERAL RADIOLOGY | Age: 63
DRG: 194 | End: 2018-09-17

## 2018-09-17 VITALS
HEIGHT: 65 IN | RESPIRATION RATE: 18 BRPM | HEART RATE: 104 BPM | SYSTOLIC BLOOD PRESSURE: 113 MMHG | BODY MASS INDEX: 17.49 KG/M2 | DIASTOLIC BLOOD PRESSURE: 66 MMHG | WEIGHT: 105 LBS | OXYGEN SATURATION: 97 % | TEMPERATURE: 97.7 F

## 2018-09-17 LAB
ALBUMIN SERPL-MCNC: 4 G/DL (ref 3.5–5.2)
ALP BLD-CCNC: 128 U/L (ref 35–104)
ALT SERPL-CCNC: 17 U/L (ref 5–33)
ANION GAP SERPL CALCULATED.3IONS-SCNC: 15 MMOL/L (ref 7–19)
AST SERPL-CCNC: 15 U/L (ref 5–32)
BASOPHILS ABSOLUTE: 0.1 K/UL (ref 0–0.2)
BASOPHILS RELATIVE PERCENT: 0.3 % (ref 0–1)
BILIRUB SERPL-MCNC: <0.2 MG/DL (ref 0.2–1.2)
BUN BLDV-MCNC: 16 MG/DL (ref 8–23)
CALCIUM SERPL-MCNC: 9.7 MG/DL (ref 8.8–10.2)
CHLORIDE BLD-SCNC: 100 MMOL/L (ref 98–111)
CO2: 24 MMOL/L (ref 22–29)
CREAT SERPL-MCNC: 0.5 MG/DL (ref 0.5–0.9)
EOSINOPHILS ABSOLUTE: 0 K/UL (ref 0–0.6)
EOSINOPHILS RELATIVE PERCENT: 0 % (ref 0–5)
GFR NON-AFRICAN AMERICAN: >60
GLUCOSE BLD-MCNC: 165 MG/DL (ref 74–109)
HCT VFR BLD CALC: 38.5 % (ref 37–47)
HEMOGLOBIN: 12.6 G/DL (ref 12–16)
LYMPHOCYTES ABSOLUTE: 0.8 K/UL (ref 1.1–4.5)
LYMPHOCYTES RELATIVE PERCENT: 5.1 % (ref 20–40)
MCH RBC QN AUTO: 31 PG (ref 27–31)
MCHC RBC AUTO-ENTMCNC: 32.7 G/DL (ref 33–37)
MCV RBC AUTO: 94.8 FL (ref 81–99)
MONOCYTES ABSOLUTE: 0.3 K/UL (ref 0–0.9)
MONOCYTES RELATIVE PERCENT: 1.8 % (ref 0–10)
NEUTROPHILS ABSOLUTE: 13.9 K/UL (ref 1.5–7.5)
NEUTROPHILS RELATIVE PERCENT: 92.1 % (ref 50–65)
PDW BLD-RTO: 13.1 % (ref 11.5–14.5)
PLATELET # BLD: 622 K/UL (ref 130–400)
PMV BLD AUTO: 8.9 FL (ref 9.4–12.3)
POTASSIUM REFLEX MAGNESIUM: 4.6 MMOL/L (ref 3.5–5)
RBC # BLD: 4.06 M/UL (ref 4.2–5.4)
SODIUM BLD-SCNC: 139 MMOL/L (ref 136–145)
TOTAL PROTEIN: 7.4 G/DL (ref 6.6–8.7)
WBC # BLD: 15 K/UL (ref 4.8–10.8)

## 2018-09-17 PROCEDURE — 85025 COMPLETE CBC W/AUTO DIFF WBC: CPT

## 2018-09-17 PROCEDURE — 6360000002 HC RX W HCPCS: Performed by: HOSPITALIST

## 2018-09-17 PROCEDURE — 6360000002 HC RX W HCPCS: Performed by: INTERNAL MEDICINE

## 2018-09-17 PROCEDURE — 80053 COMPREHEN METABOLIC PANEL: CPT

## 2018-09-17 PROCEDURE — 99238 HOSP IP/OBS DSCHRG MGMT 30/<: CPT | Performed by: HOSPITALIST

## 2018-09-17 PROCEDURE — 36415 COLL VENOUS BLD VENIPUNCTURE: CPT

## 2018-09-17 PROCEDURE — 6370000000 HC RX 637 (ALT 250 FOR IP): Performed by: INTERNAL MEDICINE

## 2018-09-17 PROCEDURE — 94761 N-INVAS EAR/PLS OXIMETRY MLT: CPT

## 2018-09-17 PROCEDURE — 87070 CULTURE OTHR SPECIMN AEROBIC: CPT

## 2018-09-17 PROCEDURE — 2700000000 HC OXYGEN THERAPY PER DAY

## 2018-09-17 PROCEDURE — 6370000000 HC RX 637 (ALT 250 FOR IP): Performed by: HOSPITALIST

## 2018-09-17 PROCEDURE — 87205 SMEAR GRAM STAIN: CPT

## 2018-09-17 PROCEDURE — 2580000003 HC RX 258: Performed by: INTERNAL MEDICINE

## 2018-09-17 PROCEDURE — 94640 AIRWAY INHALATION TREATMENT: CPT

## 2018-09-17 PROCEDURE — 71045 X-RAY EXAM CHEST 1 VIEW: CPT

## 2018-09-17 RX ORDER — ALBUTEROL SULFATE 90 UG/1
2 AEROSOL, METERED RESPIRATORY (INHALATION) EVERY 6 HOURS PRN
Status: DISCONTINUED | OUTPATIENT
Start: 2018-09-17 | End: 2018-09-18 | Stop reason: HOSPADM

## 2018-09-17 RX ORDER — IBUPROFEN 400 MG/1
400 TABLET ORAL EVERY 6 HOURS PRN
Status: DISCONTINUED | OUTPATIENT
Start: 2018-09-17 | End: 2018-09-18 | Stop reason: HOSPADM

## 2018-09-17 RX ORDER — PANTOPRAZOLE SODIUM 40 MG/1
40 TABLET, DELAYED RELEASE ORAL DAILY
Qty: 15 TABLET | Refills: 0 | Status: SHIPPED | OUTPATIENT
Start: 2018-09-17 | End: 2018-10-02

## 2018-09-17 RX ORDER — METHYLPREDNISOLONE SODIUM SUCCINATE 125 MG/2ML
125 INJECTION, POWDER, LYOPHILIZED, FOR SOLUTION INTRAMUSCULAR; INTRAVENOUS EVERY 6 HOURS
Status: DISCONTINUED | OUTPATIENT
Start: 2018-09-17 | End: 2018-09-17

## 2018-09-17 RX ORDER — LEVOFLOXACIN 5 MG/ML
750 INJECTION, SOLUTION INTRAVENOUS EVERY 24 HOURS
Status: DISCONTINUED | OUTPATIENT
Start: 2018-09-18 | End: 2018-09-18 | Stop reason: HOSPADM

## 2018-09-17 RX ORDER — METHYLPREDNISOLONE SODIUM SUCCINATE 40 MG/ML
40 INJECTION, POWDER, LYOPHILIZED, FOR SOLUTION INTRAMUSCULAR; INTRAVENOUS EVERY 6 HOURS
Status: DISCONTINUED | OUTPATIENT
Start: 2018-09-17 | End: 2018-09-18 | Stop reason: HOSPADM

## 2018-09-17 RX ORDER — ALBUTEROL SULFATE 90 UG/1
2 AEROSOL, METERED RESPIRATORY (INHALATION) EVERY 6 HOURS PRN
Qty: 1 INHALER | Refills: 0
Start: 2018-09-17

## 2018-09-17 RX ORDER — PREDNISONE 10 MG/1
TABLET ORAL
Qty: 21 TABLET | Refills: 0 | Status: SHIPPED | OUTPATIENT
Start: 2018-09-17

## 2018-09-17 RX ORDER — DOXYCYCLINE HYCLATE 100 MG
100 TABLET ORAL 2 TIMES DAILY
Qty: 14 TABLET | Refills: 0 | Status: SHIPPED | OUTPATIENT
Start: 2018-09-17 | End: 2018-09-24

## 2018-09-17 RX ADMIN — PREDNISONE 20 MG: 20 TABLET ORAL at 08:34

## 2018-09-17 RX ADMIN — METHYLPREDNISOLONE SODIUM SUCCINATE 40 MG: 40 INJECTION, POWDER, FOR SOLUTION INTRAMUSCULAR; INTRAVENOUS at 17:30

## 2018-09-17 RX ADMIN — Medication 10 ML: at 09:54

## 2018-09-17 RX ADMIN — MAGNESIUM HYDROXIDE 30 ML: 400 SUSPENSION ORAL at 13:21

## 2018-09-17 RX ADMIN — LEVOFLOXACIN 500 MG: 5 INJECTION, SOLUTION INTRAVENOUS at 13:21

## 2018-09-17 RX ADMIN — IPRATROPIUM BROMIDE AND ALBUTEROL SULFATE 1 AMPULE: .5; 3 SOLUTION RESPIRATORY (INHALATION) at 18:56

## 2018-09-17 RX ADMIN — IPRATROPIUM BROMIDE AND ALBUTEROL SULFATE 1 AMPULE: .5; 3 SOLUTION RESPIRATORY (INHALATION) at 15:30

## 2018-09-17 RX ADMIN — IBUPROFEN 400 MG: 400 TABLET ORAL at 13:21

## 2018-09-17 RX ADMIN — MOMETASONE FUROATE AND FORMOTEROL FUMARATE DIHYDRATE 2 PUFF: 100; 5 AEROSOL RESPIRATORY (INHALATION) at 17:30

## 2018-09-17 RX ADMIN — IPRATROPIUM BROMIDE AND ALBUTEROL SULFATE 1 AMPULE: .5; 3 SOLUTION RESPIRATORY (INHALATION) at 07:20

## 2018-09-17 RX ADMIN — METHYLPREDNISOLONE SODIUM SUCCINATE 125 MG: 125 INJECTION, POWDER, FOR SOLUTION INTRAMUSCULAR; INTRAVENOUS at 09:54

## 2018-09-17 RX ADMIN — ENOXAPARIN SODIUM 40 MG: 40 INJECTION SUBCUTANEOUS at 13:21

## 2018-09-17 RX ADMIN — IPRATROPIUM BROMIDE AND ALBUTEROL SULFATE 1 AMPULE: .5; 3 SOLUTION RESPIRATORY (INHALATION) at 11:03

## 2018-09-17 ASSESSMENT — PAIN SCALES - GENERAL: PAINLEVEL_OUTOF10: 4

## 2018-09-17 NOTE — PROGRESS NOTES
Pharmacy Renal Dose Adjustment      Recent Labs      09/16/18   1044  09/17/18   0233   BUN  8  16       Recent Labs      09/16/18   1044  09/17/18   0233   CREATININE  0.5  0.5       Estimated Creatinine Clearance: 87 mL/min (based on SCr of 0.5 mg/dL). Plan: Changed Levaquin to 750 mg IV q24h due to patients renal function. Pharmacy will continue to follow and make adjustments as needed.     Electronically signed by LEYDI Gilbert [facility] on 9/17/2018 at 2:47 PM

## 2018-09-19 LAB
CULTURE, RESPIRATORY: ABNORMAL
CULTURE, RESPIRATORY: ABNORMAL
EKG P AXIS: 78 DEGREES
EKG P-R INTERVAL: 126 MS
EKG Q-T INTERVAL: 400 MS
EKG QRS DURATION: 80 MS
EKG QTC CALCULATION (BAZETT): 428 MS
EKG T AXIS: 69 DEGREES
GRAM STAIN RESULT: ABNORMAL
ORGANISM: ABNORMAL

## 2018-09-21 LAB
BLOOD CULTURE, ROUTINE: NORMAL
CULTURE, BLOOD 2: NORMAL

## 2018-11-03 ENCOUNTER — APPOINTMENT (OUTPATIENT)
Dept: CT IMAGING | Facility: HOSPITAL | Age: 63
End: 2018-11-03

## 2018-11-03 ENCOUNTER — HOSPITAL ENCOUNTER (INPATIENT)
Facility: HOSPITAL | Age: 63
LOS: 5 days | Discharge: HOME | End: 2018-11-08
Attending: FAMILY MEDICINE | Admitting: INTERNAL MEDICINE

## 2018-11-03 ENCOUNTER — APPOINTMENT (OUTPATIENT)
Dept: GENERAL RADIOLOGY | Facility: HOSPITAL | Age: 63
End: 2018-11-03

## 2018-11-03 DIAGNOSIS — R93.89 ABNORMAL CT OF THE CHEST: Primary | ICD-10-CM

## 2018-11-03 DIAGNOSIS — D75.839 THROMBOCYTOSIS: ICD-10-CM

## 2018-11-03 DIAGNOSIS — J96.01 ACUTE RESPIRATORY FAILURE WITH HYPOXIA (HCC): ICD-10-CM

## 2018-11-03 DIAGNOSIS — R79.89 ELEVATED LFTS: ICD-10-CM

## 2018-11-03 DIAGNOSIS — A41.9 SEPSIS, DUE TO UNSPECIFIED ORGANISM: ICD-10-CM

## 2018-11-03 DIAGNOSIS — J44.1 COPD EXACERBATION (HCC): ICD-10-CM

## 2018-11-03 DIAGNOSIS — Z91.89 TUBERCULOSIS HIGH RISK: ICD-10-CM

## 2018-11-03 LAB
ALBUMIN SERPL-MCNC: 3.9 G/DL (ref 3.5–5)
ALBUMIN/GLOB SERPL: 1 G/DL (ref 1.1–2.5)
ALP SERPL-CCNC: 946 U/L (ref 24–120)
ALT SERPL W P-5'-P-CCNC: 187 U/L (ref 0–54)
ANION GAP SERPL CALCULATED.3IONS-SCNC: 15 MMOL/L (ref 4–13)
ARTERIAL PATENCY WRIST A: ABNORMAL
AST SERPL-CCNC: 179 U/L (ref 7–45)
ATMOSPHERIC PRESS: 754 MMHG
BASE EXCESS BLDA CALC-SCNC: 1.3 MMOL/L (ref 0–2)
BASOPHILS # BLD MANUAL: 0.19 10*3/MM3 (ref 0–0.2)
BASOPHILS NFR BLD AUTO: 1 % (ref 0–2)
BDY SITE: ABNORMAL
BILIRUB SERPL-MCNC: 0.6 MG/DL (ref 0.1–1)
BODY TEMPERATURE: 37 C
BUN BLD-MCNC: 10 MG/DL (ref 5–21)
BUN/CREAT SERPL: 16.9 (ref 7–25)
CALCIUM SPEC-SCNC: 9.4 MG/DL (ref 8.4–10.4)
CHLORIDE SERPL-SCNC: 98 MMOL/L (ref 98–110)
CO2 SERPL-SCNC: 26 MMOL/L (ref 24–31)
CREAT BLD-MCNC: 0.59 MG/DL (ref 0.5–1.4)
CRP SERPL-MCNC: 24.9 MG/DL (ref 0–0.99)
D DIMER PPP FEU-MCNC: 2.14 MG/L (FEU) (ref 0–0.5)
D-LACTATE SERPL-SCNC: 1.5 MMOL/L (ref 0.5–2)
DEPRECATED RDW RBC AUTO: 48.2 FL (ref 40–54)
EOSINOPHIL # BLD MANUAL: 0.58 10*3/MM3 (ref 0–0.7)
EOSINOPHIL NFR BLD MANUAL: 3 % (ref 0–4)
ERYTHROCYTE [DISTWIDTH] IN BLOOD BY AUTOMATED COUNT: 14.2 % (ref 12–15)
FLUAV AG NPH QL: NEGATIVE
FLUBV AG NPH QL IA: NEGATIVE
GFR SERPL CREATININE-BSD FRML MDRD: 103 ML/MIN/1.73
GLOBULIN UR ELPH-MCNC: 3.9 GM/DL
GLUCOSE BLD-MCNC: 144 MG/DL (ref 70–100)
HCO3 BLDA-SCNC: 24.5 MMOL/L (ref 20–26)
HCT VFR BLD AUTO: 37.8 % (ref 37–47)
HGB BLD-MCNC: 12.7 G/DL (ref 12–16)
HOLD SPECIMEN: NORMAL
HOLD SPECIMEN: NORMAL
LYMPHOCYTES # BLD MANUAL: 2.3 10*3/MM3 (ref 0.72–4.86)
LYMPHOCYTES NFR BLD MANUAL: 11.9 % (ref 15–45)
LYMPHOCYTES NFR BLD MANUAL: 13.9 % (ref 4–12)
Lab: ABNORMAL
Lab: ABNORMAL
MCH RBC QN AUTO: 31 PG (ref 28–32)
MCHC RBC AUTO-ENTMCNC: 33.6 G/DL (ref 33–36)
MCV RBC AUTO: 92.2 FL (ref 82–98)
MODALITY: ABNORMAL
MONOCYTES # BLD AUTO: 2.69 10*3/MM3 (ref 0.19–1.3)
MYELOCYTES NFR BLD MANUAL: 1 % (ref 0–0)
NEUTROPHILS # BLD AUTO: 13.21 10*3/MM3 (ref 1.87–8.4)
NEUTROPHILS NFR BLD MANUAL: 66.3 % (ref 39–78)
NEUTS BAND NFR BLD MANUAL: 2 % (ref 0–10)
NEUTS VAC BLD QL SMEAR: ABNORMAL
NOTIFIED BY: ABNORMAL
NOTIFIED WHO: ABNORMAL
NT-PROBNP SERPL-MCNC: 137 PG/ML (ref 0–900)
PCO2 BLDA: 33.3 MM HG (ref 35–45)
PH BLDA: 7.47 PH UNITS (ref 7.35–7.45)
PLATELET # BLD AUTO: 551 10*3/MM3 (ref 130–400)
PMV BLD AUTO: 9.1 FL (ref 6–12)
PO2 BLDA: 52.7 MM HG (ref 83–108)
POLYCHROMASIA BLD QL SMEAR: ABNORMAL
POTASSIUM BLD-SCNC: 4 MMOL/L (ref 3.5–5.3)
PROCALCITONIN SERPL-MCNC: 0.44 NG/ML
PROT SERPL-MCNC: 7.8 G/DL (ref 6.3–8.7)
RBC # BLD AUTO: 4.1 10*6/MM3 (ref 4.2–5.4)
SAO2 % BLDCOA: 90.5 % (ref 94–99)
SMALL PLATELETS BLD QL SMEAR: ABNORMAL
SODIUM BLD-SCNC: 139 MMOL/L (ref 135–145)
TARGETS BLD QL SMEAR: ABNORMAL
TOXIC GRANULATION: ABNORMAL
TROPONIN I SERPL-MCNC: <0.012 NG/ML (ref 0–0.03)
VARIANT LYMPHS NFR BLD MANUAL: 1 % (ref 0–5)
VENTILATOR MODE: ABNORMAL
WBC NRBC COR # BLD: 19.33 10*3/MM3 (ref 4.8–10.8)
WHOLE BLOOD HOLD SPECIMEN: NORMAL
WHOLE BLOOD HOLD SPECIMEN: NORMAL

## 2018-11-03 PROCEDURE — 87804 INFLUENZA ASSAY W/OPTIC: CPT | Performed by: PHYSICIAN ASSISTANT

## 2018-11-03 PROCEDURE — 36600 WITHDRAWAL OF ARTERIAL BLOOD: CPT

## 2018-11-03 PROCEDURE — 25010000002 VANCOMYCIN 1 G RECONSTITUTED SOLUTION: Performed by: PHYSICIAN ASSISTANT

## 2018-11-03 PROCEDURE — 71046 X-RAY EXAM CHEST 2 VIEWS: CPT

## 2018-11-03 PROCEDURE — 83036 HEMOGLOBIN GLYCOSYLATED A1C: CPT | Performed by: FAMILY MEDICINE

## 2018-11-03 PROCEDURE — 80074 ACUTE HEPATITIS PANEL: CPT | Performed by: FAMILY MEDICINE

## 2018-11-03 PROCEDURE — 80053 COMPREHEN METABOLIC PANEL: CPT | Performed by: EMERGENCY MEDICINE

## 2018-11-03 PROCEDURE — 0 IOPAMIDOL PER 1 ML: Performed by: PHYSICIAN ASSISTANT

## 2018-11-03 PROCEDURE — 93005 ELECTROCARDIOGRAM TRACING: CPT | Performed by: FAMILY MEDICINE

## 2018-11-03 PROCEDURE — 87040 BLOOD CULTURE FOR BACTERIA: CPT | Performed by: PHYSICIAN ASSISTANT

## 2018-11-03 PROCEDURE — 93010 ELECTROCARDIOGRAM REPORT: CPT | Performed by: INTERNAL MEDICINE

## 2018-11-03 PROCEDURE — 94799 UNLISTED PULMONARY SVC/PX: CPT

## 2018-11-03 PROCEDURE — 85007 BL SMEAR W/DIFF WBC COUNT: CPT | Performed by: EMERGENCY MEDICINE

## 2018-11-03 PROCEDURE — 94640 AIRWAY INHALATION TREATMENT: CPT

## 2018-11-03 PROCEDURE — 83880 ASSAY OF NATRIURETIC PEPTIDE: CPT | Performed by: EMERGENCY MEDICINE

## 2018-11-03 PROCEDURE — 99285 EMERGENCY DEPT VISIT HI MDM: CPT

## 2018-11-03 PROCEDURE — 85025 COMPLETE CBC W/AUTO DIFF WBC: CPT | Performed by: EMERGENCY MEDICINE

## 2018-11-03 PROCEDURE — 82803 BLOOD GASES ANY COMBINATION: CPT

## 2018-11-03 PROCEDURE — 84145 PROCALCITONIN (PCT): CPT | Performed by: PHYSICIAN ASSISTANT

## 2018-11-03 PROCEDURE — 93005 ELECTROCARDIOGRAM TRACING: CPT | Performed by: PHYSICIAN ASSISTANT

## 2018-11-03 PROCEDURE — 83605 ASSAY OF LACTIC ACID: CPT | Performed by: EMERGENCY MEDICINE

## 2018-11-03 PROCEDURE — 85379 FIBRIN DEGRADATION QUANT: CPT | Performed by: PHYSICIAN ASSISTANT

## 2018-11-03 PROCEDURE — 93005 ELECTROCARDIOGRAM TRACING: CPT | Performed by: EMERGENCY MEDICINE

## 2018-11-03 PROCEDURE — 25010000002 PIPERACILLIN SOD-TAZOBACTAM PER 1 G: Performed by: PHYSICIAN ASSISTANT

## 2018-11-03 PROCEDURE — 71275 CT ANGIOGRAPHY CHEST: CPT

## 2018-11-03 PROCEDURE — 86140 C-REACTIVE PROTEIN: CPT | Performed by: PHYSICIAN ASSISTANT

## 2018-11-03 PROCEDURE — 84484 ASSAY OF TROPONIN QUANT: CPT | Performed by: EMERGENCY MEDICINE

## 2018-11-03 RX ORDER — SODIUM CHLORIDE 0.9 % (FLUSH) 0.9 %
10 SYRINGE (ML) INJECTION AS NEEDED
Status: DISCONTINUED | OUTPATIENT
Start: 2018-11-03 | End: 2018-11-08 | Stop reason: HOSPADM

## 2018-11-03 RX ORDER — SODIUM CHLORIDE 9 MG/ML
125 INJECTION, SOLUTION INTRAVENOUS CONTINUOUS
Status: DISCONTINUED | OUTPATIENT
Start: 2018-11-03 | End: 2018-11-04

## 2018-11-03 RX ORDER — ALBUTEROL SULFATE 2.5 MG/3ML
2.5 SOLUTION RESPIRATORY (INHALATION)
Status: COMPLETED | OUTPATIENT
Start: 2018-11-03 | End: 2018-11-03

## 2018-11-03 RX ADMIN — IOPAMIDOL 100 ML: 755 INJECTION, SOLUTION INTRAVENOUS at 20:44

## 2018-11-03 RX ADMIN — ALBUTEROL SULFATE 2.5 MG: 2.5 SOLUTION RESPIRATORY (INHALATION) at 20:05

## 2018-11-03 RX ADMIN — SODIUM CHLORIDE 1428 ML: 9 INJECTION, SOLUTION INTRAVENOUS at 20:17

## 2018-11-03 RX ADMIN — VANCOMYCIN HYDROCHLORIDE 1000 MG: 1 INJECTION, POWDER, LYOPHILIZED, FOR SOLUTION INTRAVENOUS at 21:04

## 2018-11-03 RX ADMIN — ALBUTEROL SULFATE 2.5 MG: 2.5 SOLUTION RESPIRATORY (INHALATION) at 19:57

## 2018-11-03 RX ADMIN — PIPERACILLIN SODIUM AND TAZOBACTAM SODIUM 3.38 G: 3; .375 INJECTION, POWDER, LYOPHILIZED, FOR SOLUTION INTRAVENOUS at 20:18

## 2018-11-04 ENCOUNTER — APPOINTMENT (OUTPATIENT)
Dept: ULTRASOUND IMAGING | Facility: HOSPITAL | Age: 63
End: 2018-11-04

## 2018-11-04 ENCOUNTER — APPOINTMENT (OUTPATIENT)
Dept: CARDIOLOGY | Facility: HOSPITAL | Age: 63
End: 2018-11-04
Attending: FAMILY MEDICINE

## 2018-11-04 LAB
ANION GAP SERPL CALCULATED.3IONS-SCNC: 14 MMOL/L (ref 4–13)
ARTERIAL PATENCY WRIST A: ABNORMAL
ARTERIAL PATENCY WRIST A: ABNORMAL
ATMOSPHERIC PRESS: 752 MMHG
ATMOSPHERIC PRESS: 753 MMHG
BACTERIA UR QL AUTO: ABNORMAL /HPF
BASE EXCESS BLDA CALC-SCNC: -1 MMOL/L (ref 0–2)
BASE EXCESS BLDA CALC-SCNC: 1.3 MMOL/L (ref 0–2)
BDY SITE: ABNORMAL
BDY SITE: ABNORMAL
BH CV ECHO MEAS - AO MAX PG (FULL): 3.9 MMHG
BH CV ECHO MEAS - AO MAX PG: 10.8 MMHG
BH CV ECHO MEAS - AO MEAN PG (FULL): 2 MMHG
BH CV ECHO MEAS - AO MEAN PG: 5 MMHG
BH CV ECHO MEAS - AO ROOT AREA (BSA CORRECTED): 1.7
BH CV ECHO MEAS - AO ROOT AREA: 4.5 CM^2
BH CV ECHO MEAS - AO ROOT DIAM: 2.4 CM
BH CV ECHO MEAS - AO V2 MAX: 164 CM/SEC
BH CV ECHO MEAS - AO V2 MEAN: 96.9 CM/SEC
BH CV ECHO MEAS - AO V2 VTI: 35.4 CM
BH CV ECHO MEAS - AVA(I,A): 2.6 CM^2
BH CV ECHO MEAS - AVA(I,D): 2.6 CM^2
BH CV ECHO MEAS - AVA(V,A): 2.5 CM^2
BH CV ECHO MEAS - AVA(V,D): 2.5 CM^2
BH CV ECHO MEAS - BSA(HAYCOCK): 1.4 M^2
BH CV ECHO MEAS - BSA: 1.5 M^2
BH CV ECHO MEAS - BZI_BMI: 16.1 KILOGRAMS/M^2
BH CV ECHO MEAS - BZI_METRIC_HEIGHT: 165.1 CM
BH CV ECHO MEAS - BZI_METRIC_WEIGHT: 44 KG
BH CV ECHO MEAS - EDV(CUBED): 92.3 ML
BH CV ECHO MEAS - EDV(MOD-SP4): 60.9 ML
BH CV ECHO MEAS - EDV(TEICH): 93.4 ML
BH CV ECHO MEAS - EF(CUBED): 79.6 %
BH CV ECHO MEAS - EF(MOD-SP4): 69.8 %
BH CV ECHO MEAS - EF(TEICH): 72.1 %
BH CV ECHO MEAS - ESV(CUBED): 18.8 ML
BH CV ECHO MEAS - ESV(MOD-SP4): 18.4 ML
BH CV ECHO MEAS - ESV(TEICH): 26 ML
BH CV ECHO MEAS - FS: 41.2 %
BH CV ECHO MEAS - IVS/LVPW: 1
BH CV ECHO MEAS - IVSD: 0.85 CM
BH CV ECHO MEAS - LA DIMENSION: 3.2 CM
BH CV ECHO MEAS - LA/AO: 1.3
BH CV ECHO MEAS - LAT PEAK E' VEL: 10.7 CM/SEC
BH CV ECHO MEAS - LV DIASTOLIC VOL/BSA (35-75): 41.9 ML/M^2
BH CV ECHO MEAS - LV MASS(C)D: 120.1 GRAMS
BH CV ECHO MEAS - LV MASS(C)DI: 82.6 GRAMS/M^2
BH CV ECHO MEAS - LV MAX PG: 6.9 MMHG
BH CV ECHO MEAS - LV MEAN PG: 3 MMHG
BH CV ECHO MEAS - LV SYSTOLIC VOL/BSA (12-30): 12.7 ML/M^2
BH CV ECHO MEAS - LV V1 MAX: 131 CM/SEC
BH CV ECHO MEAS - LV V1 MEAN: 71.1 CM/SEC
BH CV ECHO MEAS - LV V1 VTI: 29.8 CM
BH CV ECHO MEAS - LVIDD: 4.5 CM
BH CV ECHO MEAS - LVIDS: 2.7 CM
BH CV ECHO MEAS - LVLD AP4: 8.2 CM
BH CV ECHO MEAS - LVLS AP4: 6.3 CM
BH CV ECHO MEAS - LVOT AREA (M): 3.1 CM^2
BH CV ECHO MEAS - LVOT AREA: 3.1 CM^2
BH CV ECHO MEAS - LVOT DIAM: 2 CM
BH CV ECHO MEAS - LVPWD: 0.81 CM
BH CV ECHO MEAS - MED PEAK E' VEL: 6.85 CM/SEC
BH CV ECHO MEAS - MV A MAX VEL: 116 CM/SEC
BH CV ECHO MEAS - MV DEC TIME: 0.19 SEC
BH CV ECHO MEAS - MV E MAX VEL: 79.1 CM/SEC
BH CV ECHO MEAS - MV E/A: 0.68
BH CV ECHO MEAS - SI(AO): 110.1 ML/M^2
BH CV ECHO MEAS - SI(CUBED): 50.6 ML/M^2
BH CV ECHO MEAS - SI(LVOT): 64.4 ML/M^2
BH CV ECHO MEAS - SI(MOD-SP4): 29.2 ML/M^2
BH CV ECHO MEAS - SI(TEICH): 46.3 ML/M^2
BH CV ECHO MEAS - SV(AO): 160.1 ML
BH CV ECHO MEAS - SV(CUBED): 73.5 ML
BH CV ECHO MEAS - SV(LVOT): 93.6 ML
BH CV ECHO MEAS - SV(MOD-SP4): 42.5 ML
BH CV ECHO MEAS - SV(TEICH): 67.4 ML
BH CV ECHO MEASUREMENTS AVERAGE E/E' RATIO: 9.01
BILIRUB UR QL STRIP: NEGATIVE
BODY TEMPERATURE: 37 C
BODY TEMPERATURE: 37 C
BUN BLD-MCNC: 7 MG/DL (ref 5–21)
BUN/CREAT SERPL: 14.9 (ref 7–25)
CALCIUM SPEC-SCNC: 7.6 MG/DL (ref 8.4–10.4)
CHLORIDE SERPL-SCNC: 105 MMOL/L (ref 98–110)
CLARITY UR: CLEAR
CO2 SERPL-SCNC: 22 MMOL/L (ref 24–31)
COLOR UR: YELLOW
CREAT BLD-MCNC: 0.47 MG/DL (ref 0.5–1.4)
DEPRECATED RDW RBC AUTO: 49.1 FL (ref 40–54)
ERYTHROCYTE [DISTWIDTH] IN BLOOD BY AUTOMATED COUNT: 14.6 % (ref 12–15)
GAS FLOW AIRWAY: 2 LPM
GAS FLOW AIRWAY: 4 LPM
GFR SERPL CREATININE-BSD FRML MDRD: 134 ML/MIN/1.73
GIANT PLATELETS: ABNORMAL
GLUCOSE BLD-MCNC: 135 MG/DL (ref 70–100)
GLUCOSE UR STRIP-MCNC: NEGATIVE MG/DL
HAV IGM SERPL QL IA: NEGATIVE
HBA1C MFR BLD: 5.9 %
HBV CORE IGM SERPL QL IA: NEGATIVE
HBV SURFACE AG SERPL QL IA: NEGATIVE
HCO3 BLDA-SCNC: 23.7 MMOL/L (ref 20–26)
HCO3 BLDA-SCNC: 26.1 MMOL/L (ref 20–26)
HCT VFR BLD AUTO: 31.7 % (ref 37–47)
HCV AB SER DONR QL: NEGATIVE
HCV S/C RATIO: 0.01 (ref 0–0.99)
HGB BLD-MCNC: 10.9 G/DL (ref 12–16)
HGB UR QL STRIP.AUTO: ABNORMAL
HIV1+2 AB SER QL: NEGATIVE
HYALINE CASTS UR QL AUTO: ABNORMAL /LPF
KETONES UR QL STRIP: NEGATIVE
L PNEUMO1 AG UR QL IA: NEGATIVE
LEFT ATRIUM VOLUME INDEX: 17.4 ML/M2
LEFT ATRIUM VOLUME: 25.2 CM3
LEUKOCYTE ESTERASE UR QL STRIP.AUTO: NEGATIVE
LYMPHOCYTES # BLD MANUAL: 0.31 10*3/MM3 (ref 0.72–4.86)
LYMPHOCYTES NFR BLD MANUAL: 2 % (ref 15–45)
LYMPHOCYTES NFR BLD MANUAL: 3 % (ref 4–12)
Lab: ABNORMAL
Lab: ABNORMAL
MAGNESIUM SERPL-MCNC: 1.6 MG/DL (ref 1.4–2.2)
MAXIMAL PREDICTED HEART RATE: 157 BPM
MCH RBC QN AUTO: 31.7 PG (ref 28–32)
MCHC RBC AUTO-ENTMCNC: 34.4 G/DL (ref 33–36)
MCV RBC AUTO: 92.2 FL (ref 82–98)
MODALITY: ABNORMAL
MODALITY: ABNORMAL
MONOCYTES # BLD AUTO: 0.47 10*3/MM3 (ref 0.19–1.3)
NEUTROPHILS # BLD AUTO: 14.57 10*3/MM3 (ref 1.87–8.4)
NEUTROPHILS NFR BLD MANUAL: 93.1 % (ref 39–78)
NEUTS VAC BLD QL SMEAR: ABNORMAL
NITRITE UR QL STRIP: NEGATIVE
PCO2 BLDA: 38.7 MM HG (ref 35–45)
PCO2 BLDA: 41.6 MM HG (ref 35–45)
PH BLDA: 7.39 PH UNITS (ref 7.35–7.45)
PH BLDA: 7.41 PH UNITS (ref 7.35–7.45)
PH UR STRIP.AUTO: <=5 [PH] (ref 5–8)
PLATELET # BLD AUTO: 493 10*3/MM3 (ref 130–400)
PMV BLD AUTO: 9.3 FL (ref 6–12)
PO2 BLDA: 75.2 MM HG (ref 83–108)
PO2 BLDA: 88.6 MM HG (ref 83–108)
POIKILOCYTOSIS BLD QL SMEAR: ABNORMAL
POTASSIUM BLD-SCNC: 3.5 MMOL/L (ref 3.5–5.3)
PROCALCITONIN SERPL-MCNC: 0.51 NG/ML
PROT UR QL STRIP: NEGATIVE
RBC # BLD AUTO: 3.44 10*6/MM3 (ref 4.2–5.4)
RBC # UR: ABNORMAL /HPF
REF LAB TEST METHOD: ABNORMAL
S PNEUM AG SPEC QL LA: NEGATIVE
SAO2 % BLDCOA: 95.5 % (ref 94–99)
SAO2 % BLDCOA: 97.4 % (ref 94–99)
SMALL PLATELETS BLD QL SMEAR: ABNORMAL
SODIUM BLD-SCNC: 141 MMOL/L (ref 135–145)
SP GR UR STRIP: 1.01 (ref 1–1.03)
SQUAMOUS #/AREA URNS HPF: ABNORMAL /HPF
STRESS TARGET HR: 133 BPM
TOXIC GRANULATION: ABNORMAL
UROBILINOGEN UR QL STRIP: ABNORMAL
VARIANT LYMPHS NFR BLD MANUAL: 2 % (ref 0–5)
VENTILATOR MODE: ABNORMAL
VENTILATOR MODE: ABNORMAL
WBC NRBC COR # BLD: 15.65 10*3/MM3 (ref 4.8–10.8)
WBC UR QL AUTO: ABNORMAL /HPF

## 2018-11-04 PROCEDURE — 94799 UNLISTED PULMONARY SVC/PX: CPT

## 2018-11-04 PROCEDURE — 84145 PROCALCITONIN (PCT): CPT | Performed by: FAMILY MEDICINE

## 2018-11-04 PROCEDURE — G0432 EIA HIV-1/HIV-2 SCREEN: HCPCS | Performed by: FAMILY MEDICINE

## 2018-11-04 PROCEDURE — 87581 M.PNEUMON DNA AMP PROBE: CPT | Performed by: INTERNAL MEDICINE

## 2018-11-04 PROCEDURE — 81001 URINALYSIS AUTO W/SCOPE: CPT | Performed by: FAMILY MEDICINE

## 2018-11-04 PROCEDURE — 25010000002 METHYLPREDNISOLONE PER 40 MG: Performed by: FAMILY MEDICINE

## 2018-11-04 PROCEDURE — 87798 DETECT AGENT NOS DNA AMP: CPT | Performed by: INTERNAL MEDICINE

## 2018-11-04 PROCEDURE — 93306 TTE W/DOPPLER COMPLETE: CPT | Performed by: INTERNAL MEDICINE

## 2018-11-04 PROCEDURE — 85007 BL SMEAR W/DIFF WBC COUNT: CPT | Performed by: FAMILY MEDICINE

## 2018-11-04 PROCEDURE — 25010000002 CEFEPIME 2 G/NS 100 ML SOLUTION: Performed by: FAMILY MEDICINE

## 2018-11-04 PROCEDURE — 93970 EXTREMITY STUDY: CPT

## 2018-11-04 PROCEDURE — 86480 TB TEST CELL IMMUN MEASURE: CPT | Performed by: FAMILY MEDICINE

## 2018-11-04 PROCEDURE — 87486 CHLMYD PNEUM DNA AMP PROBE: CPT | Performed by: INTERNAL MEDICINE

## 2018-11-04 PROCEDURE — 94640 AIRWAY INHALATION TREATMENT: CPT

## 2018-11-04 PROCEDURE — 93306 TTE W/DOPPLER COMPLETE: CPT

## 2018-11-04 PROCEDURE — 87633 RESP VIRUS 12-25 TARGETS: CPT | Performed by: INTERNAL MEDICINE

## 2018-11-04 PROCEDURE — 80048 BASIC METABOLIC PNL TOTAL CA: CPT | Performed by: FAMILY MEDICINE

## 2018-11-04 PROCEDURE — 93970 EXTREMITY STUDY: CPT | Performed by: SURGERY

## 2018-11-04 PROCEDURE — 87899 AGENT NOS ASSAY W/OPTIC: CPT | Performed by: FAMILY MEDICINE

## 2018-11-04 PROCEDURE — 25010000002 VANCOMYCIN PER 500 MG: Performed by: FAMILY MEDICINE

## 2018-11-04 PROCEDURE — 25010000002 FUROSEMIDE PER 20 MG: Performed by: FAMILY MEDICINE

## 2018-11-04 PROCEDURE — 87070 CULTURE OTHR SPECIMN AEROBIC: CPT | Performed by: FAMILY MEDICINE

## 2018-11-04 PROCEDURE — 82803 BLOOD GASES ANY COMBINATION: CPT

## 2018-11-04 PROCEDURE — 83735 ASSAY OF MAGNESIUM: CPT | Performed by: FAMILY MEDICINE

## 2018-11-04 PROCEDURE — 99232 SBSQ HOSP IP/OBS MODERATE 35: CPT | Performed by: INTERNAL MEDICINE

## 2018-11-04 PROCEDURE — 25010000002 CEFEPIME PER 500 MG: Performed by: FAMILY MEDICINE

## 2018-11-04 PROCEDURE — 85027 COMPLETE CBC AUTOMATED: CPT | Performed by: FAMILY MEDICINE

## 2018-11-04 PROCEDURE — 36600 WITHDRAWAL OF ARTERIAL BLOOD: CPT

## 2018-11-04 PROCEDURE — 87077 CULTURE AEROBIC IDENTIFY: CPT | Performed by: FAMILY MEDICINE

## 2018-11-04 PROCEDURE — 25010000002 HEPARIN (PORCINE) PER 1000 UNITS: Performed by: FAMILY MEDICINE

## 2018-11-04 PROCEDURE — 87205 SMEAR GRAM STAIN: CPT | Performed by: FAMILY MEDICINE

## 2018-11-04 RX ORDER — HEPARIN SODIUM 5000 [USP'U]/ML
5000 INJECTION, SOLUTION INTRAVENOUS; SUBCUTANEOUS EVERY 8 HOURS SCHEDULED
Status: DISCONTINUED | OUTPATIENT
Start: 2018-11-04 | End: 2018-11-05

## 2018-11-04 RX ORDER — FAMOTIDINE 20 MG/1
40 TABLET, FILM COATED ORAL DAILY
Status: DISCONTINUED | OUTPATIENT
Start: 2018-11-04 | End: 2018-11-04

## 2018-11-04 RX ORDER — BUDESONIDE AND FORMOTEROL FUMARATE DIHYDRATE 160; 4.5 UG/1; UG/1
2 AEROSOL RESPIRATORY (INHALATION)
Status: DISCONTINUED | OUTPATIENT
Start: 2018-11-04 | End: 2018-11-08 | Stop reason: HOSPADM

## 2018-11-04 RX ORDER — SODIUM CHLORIDE 9 MG/ML
50 INJECTION, SOLUTION INTRAVENOUS CONTINUOUS
Status: DISCONTINUED | OUTPATIENT
Start: 2018-11-04 | End: 2018-11-05

## 2018-11-04 RX ORDER — SODIUM CHLORIDE 0.9 % (FLUSH) 0.9 %
3 SYRINGE (ML) INJECTION EVERY 12 HOURS SCHEDULED
Status: DISCONTINUED | OUTPATIENT
Start: 2018-11-04 | End: 2018-11-08 | Stop reason: HOSPADM

## 2018-11-04 RX ORDER — ALBUTEROL SULFATE 90 UG/1
AEROSOL, METERED RESPIRATORY (INHALATION)
Status: ON HOLD | COMMUNITY
Start: 2018-09-17 | End: 2018-11-06

## 2018-11-04 RX ORDER — IPRATROPIUM BROMIDE AND ALBUTEROL SULFATE 2.5; .5 MG/3ML; MG/3ML
3 SOLUTION RESPIRATORY (INHALATION)
Status: DISCONTINUED | OUTPATIENT
Start: 2018-11-04 | End: 2018-11-05

## 2018-11-04 RX ORDER — FUROSEMIDE 10 MG/ML
60 INJECTION INTRAMUSCULAR; INTRAVENOUS ONCE
Status: COMPLETED | OUTPATIENT
Start: 2018-11-04 | End: 2018-11-04

## 2018-11-04 RX ORDER — METHYLPREDNISOLONE SODIUM SUCCINATE 40 MG/ML
40 INJECTION, POWDER, LYOPHILIZED, FOR SOLUTION INTRAMUSCULAR; INTRAVENOUS EVERY 6 HOURS
Status: DISCONTINUED | OUTPATIENT
Start: 2018-11-04 | End: 2018-11-05

## 2018-11-04 RX ORDER — FAMOTIDINE 20 MG/1
20 TABLET, FILM COATED ORAL 2 TIMES DAILY
Status: DISCONTINUED | OUTPATIENT
Start: 2018-11-04 | End: 2018-11-08 | Stop reason: HOSPADM

## 2018-11-04 RX ORDER — SODIUM CHLORIDE 0.9 % (FLUSH) 0.9 %
3-10 SYRINGE (ML) INJECTION AS NEEDED
Status: DISCONTINUED | OUTPATIENT
Start: 2018-11-04 | End: 2018-11-08 | Stop reason: HOSPADM

## 2018-11-04 RX ORDER — VANCOMYCIN HYDROCHLORIDE 1 G/200ML
1000 INJECTION, SOLUTION INTRAVENOUS EVERY 24 HOURS
Status: DISCONTINUED | OUTPATIENT
Start: 2018-11-04 | End: 2018-11-05

## 2018-11-04 RX ADMIN — Medication 3 ML: at 21:21

## 2018-11-04 RX ADMIN — FUROSEMIDE 60 MG: 10 INJECTION, SOLUTION INTRAMUSCULAR; INTRAVENOUS at 02:11

## 2018-11-04 RX ADMIN — IPRATROPIUM BROMIDE AND ALBUTEROL SULFATE 3 ML: 2.5; .5 SOLUTION RESPIRATORY (INHALATION) at 22:12

## 2018-11-04 RX ADMIN — VANCOMYCIN HYDROCHLORIDE 1000 MG: 1 INJECTION, SOLUTION INTRAVENOUS at 18:03

## 2018-11-04 RX ADMIN — CEFEPIME HYDROCHLORIDE 2 G: 2 INJECTION, POWDER, FOR SOLUTION INTRAVENOUS at 12:14

## 2018-11-04 RX ADMIN — SODIUM CHLORIDE 150 ML/HR: 9 INJECTION, SOLUTION INTRAVENOUS at 02:13

## 2018-11-04 RX ADMIN — BUDESONIDE AND FORMOTEROL FUMARATE DIHYDRATE 2 PUFF: 160; 4.5 AEROSOL RESPIRATORY (INHALATION) at 22:12

## 2018-11-04 RX ADMIN — Medication 3 ML: at 02:12

## 2018-11-04 RX ADMIN — HEPARIN SODIUM 5000 UNITS: 5000 INJECTION, SOLUTION INTRAVENOUS; SUBCUTANEOUS at 06:36

## 2018-11-04 RX ADMIN — CEFEPIME 2 G: 2 INJECTION, POWDER, FOR SOLUTION INTRAVENOUS at 02:11

## 2018-11-04 RX ADMIN — IPRATROPIUM BROMIDE AND ALBUTEROL SULFATE 3 ML: 2.5; .5 SOLUTION RESPIRATORY (INHALATION) at 15:57

## 2018-11-04 RX ADMIN — IPRATROPIUM BROMIDE AND ALBUTEROL SULFATE 3 ML: 2.5; .5 SOLUTION RESPIRATORY (INHALATION) at 11:42

## 2018-11-04 RX ADMIN — METHYLPREDNISOLONE SODIUM SUCCINATE 40 MG: 40 INJECTION, POWDER, LYOPHILIZED, FOR SOLUTION INTRAMUSCULAR; INTRAVENOUS at 06:36

## 2018-11-04 RX ADMIN — HEPARIN SODIUM 5000 UNITS: 5000 INJECTION, SOLUTION INTRAVENOUS; SUBCUTANEOUS at 12:14

## 2018-11-04 RX ADMIN — IPRATROPIUM BROMIDE AND ALBUTEROL SULFATE 3 ML: 2.5; .5 SOLUTION RESPIRATORY (INHALATION) at 08:02

## 2018-11-04 RX ADMIN — BUDESONIDE AND FORMOTEROL FUMARATE DIHYDRATE 2 PUFF: 160; 4.5 AEROSOL RESPIRATORY (INHALATION) at 01:00

## 2018-11-04 RX ADMIN — SODIUM CHLORIDE 150 ML/HR: 9 INJECTION, SOLUTION INTRAVENOUS at 05:42

## 2018-11-04 RX ADMIN — CEFEPIME HYDROCHLORIDE 2 G: 2 INJECTION, POWDER, FOR SOLUTION INTRAVENOUS at 17:59

## 2018-11-04 RX ADMIN — METHYLPREDNISOLONE SODIUM SUCCINATE 40 MG: 40 INJECTION, POWDER, LYOPHILIZED, FOR SOLUTION INTRAMUSCULAR; INTRAVENOUS at 18:03

## 2018-11-04 RX ADMIN — METHYLPREDNISOLONE SODIUM SUCCINATE 40 MG: 40 INJECTION, POWDER, LYOPHILIZED, FOR SOLUTION INTRAMUSCULAR; INTRAVENOUS at 12:14

## 2018-11-04 RX ADMIN — FAMOTIDINE 20 MG: 20 TABLET, FILM COATED ORAL at 21:19

## 2018-11-04 RX ADMIN — BUDESONIDE AND FORMOTEROL FUMARATE DIHYDRATE 2 PUFF: 160; 4.5 AEROSOL RESPIRATORY (INHALATION) at 08:08

## 2018-11-04 RX ADMIN — HEPARIN SODIUM 5000 UNITS: 5000 INJECTION, SOLUTION INTRAVENOUS; SUBCUTANEOUS at 21:18

## 2018-11-04 RX ADMIN — METHYLPREDNISOLONE SODIUM SUCCINATE 40 MG: 40 INJECTION, POWDER, LYOPHILIZED, FOR SOLUTION INTRAMUSCULAR; INTRAVENOUS at 02:11

## 2018-11-04 RX ADMIN — SODIUM CHLORIDE 150 ML/HR: 9 INJECTION, SOLUTION INTRAVENOUS at 12:15

## 2018-11-04 NOTE — PROGRESS NOTES
Discharge Planning Assessment  [facility]     Patient Name: Tea Sams  MRN: [id number]  Today's Date: 11/4/2018    Admit Date: 11/3/2018          Discharge Needs Assessment     Row Name 11/04/18 0928       Living Environment    Lives With alone    Current Living Arrangements home/apartment/condo    Primary Care Provided by self    Provides Primary Care For no one    Quality of Family Relationships helpful;involved;supportive    Able to Return to Prior Arrangements yes       Resource/Environmental Concerns    Resource/Environmental Concerns financial    Financial Concerns insurance, none       Transition Planning    Patient/Family Anticipates Transition to home    Patient/Family Anticipated Services at Transition none    Transportation Anticipated family or friend will provide       Discharge Needs Assessment    Readmission Within the Last 30 Days no previous admission in last 30 days    Concerns to be Addressed no discharge needs identified    Equipment Currently Used at Home none    Anticipated Changes Related to Illness none    Current Discharge Risk chronically ill    Discharge Coordination/Progress Pt was recently admitted to [facility] with same dx.  Pt does not have a PCP or RX coverage.  SW left voicemail for Med [facility]ist.  SW can try to assist with medications upon dc.  SW will follow.            Discharge Plan    No documentation.       Destination     No service coordination in this encounter.      Durable Medical Equipment     No service coordination in this encounter.      Dialysis/Infusion     No service coordination in this encounter.      Home Medical Care     No service coordination in this encounter.      Social Care     No service coordination in this encounter.                Demographic Summary    No documentation.           Functional Status    No documentation.           Psychosocial    No documentation.           Abuse/Neglect    No documentation.           Legal    No documentation.            Substance Abuse    No documentation.           Patient Forms    No documentation.         HE AriasW

## 2018-11-04 NOTE — CONSULTS
Pharmacy Dosing Service  Antimicrobials  El Cefepime    Assessment/Action/Plan:  Initiated El Cefepime 2G once followed by Cefepime 2g Q8H over 4 hours for 7 days.     Subjective:  Tea Sams is a 63 y.o. female currently being treated for sepsis/PNA.    Objective:  Estimated Creatinine Clearance: 67.8 mL/min (by C-G formula based on SCr of 0.59 mg/dL).   Lab Results   Component Value Date    CREATININE 0.59 11/03/2018    CREATININE 0.80 10/20/2017    CREATININE 0.51 02/28/2015     Lab Results   Component Value Date    WBC 19.33 (H) 11/03/2018     Temp Readings from Last 1 Encounters:   11/04/18 99.5 °F (37.5 °C) (Oral)       Culture Results:  Microbiology Results (last 10 days)       Procedure Component Value - Date/Time    Influenza Antigen, Rapid - Swab, Nasopharynx [[id number]]  (Normal) Collected:  11/03/18 1958    Lab Status:  Final result Specimen:  Swab from Nasopharynx Updated:  11/03/18 2015     Influenza A Ag, EIA Negative     Influenza B Ag, EIA Negative    Narrative:         Recommend confirmation of negative results by viral culture or molecular assay.          Current Antimicrobials:   Anti-Infectives       Ordered     Dose/Rate Route Frequency Start Stop    11/04/18 0028  cefepime (MAXIPIME) 2 g/100 mL 0.9% NS (mbp)     Ordering Provider:  Rusty Abdul DO    2 g  over 4 Hours Intravenous Every 8 Hours 11/04/18 0900 11/11/18 0859    11/04/18 0028  cefepime (MAXIPIME) 2 g/100 mL 0.9% NS (mbp)     Ordering Provider:  Rusty Abdul DO    2 g  200 mL/hr over 30 Minutes Intravenous Once 11/04/18 0115      11/04/18 0011  Pharmacy to dose vancomycin     Ordering Provider:  Rusty Abdul DO     Does not apply Continuous PRN 11/04/18 0011 11/09/18 0010    11/03/18 1945  vancomycin 1 g/250 mL 0.9% NS (vial-mate)     Ordering Provider:  Ebony Chen PA    1,000 mg Intravenous Once 11/03/18 1947 11/03/18 2209    11/03/18 1945  piperacillin-tazobactam (ZOSYN) 3.375 g/100  mL 0.9% NS IVPB (mbp)     Ordering Provider:  Ebony Chen PA    3.375 g  over 30 Minutes Intravenous Once 11/03/18 1947 11/03/18 2155            Charlotte Dominguez RPH  11/04/18 12:30 AM

## 2018-11-04 NOTE — PROGRESS NOTES
[facility] Medicine Services  INPATIENT PROGRESS NOTE    Length of Stay: 1  Date of Admission: 11/3/2018  Primary Care Physician: Provider, No Known    Subjective   Chief Complaint: Shortness of breath.    HPI   Patient stated she is breathing better.  Patient does have chest pain with cough.  Patient denies any history of STD.  Patient denies any TB exposure.  Patient denies any drug use.  Last use of marijuana was in high school.    Review of Systems   Constitutional: Positive for activity change, appetite change and fatigue. Negative for chills and fever.   HENT: Negative for hearing loss, nosebleeds, tinnitus and trouble swallowing.    Eyes: Negative for visual disturbance.   Respiratory: Positive for cough, shortness of breath and wheezing. Negative for chest tightness.    Cardiovascular: Negative for chest pain, palpitations and leg swelling.   Gastrointestinal: Negative for abdominal distention, abdominal pain, blood in stool, constipation, diarrhea, nausea and vomiting.   Endocrine: Negative for cold intolerance, heat intolerance, polydipsia, polyphagia and polyuria.   Genitourinary: Negative for decreased urine volume, difficulty urinating, dysuria, flank pain, frequency and hematuria.   Musculoskeletal: Positive for gait problem. Negative for arthralgias, joint swelling and myalgias.   Skin: Negative for rash.   Allergic/Immunologic: Negative for immunocompromised state.   Neurological: Positive for weakness. Negative for dizziness, syncope, light-headedness and headaches.   Hematological: Negative for adenopathy. Does not bruise/bleed easily.   Psychiatric/Behavioral: Negative for confusion and sleep disturbance. The patient is not nervous/anxious.           All pertinent negatives and positives are as above. All other systems have been reviewed and are negative unless otherwise stated.     Objective    Temp:  [98.9 °F (37.2 °C)-99.5 °F (37.5 °C)] 98.9 °F (37.2  °C)  Heart Rate:  [] 100  Resp:  [15-32] 20  BP: (107-150)/(52-99) 128/76    Intake/Output Summary (Last 24 hours) at 11/04/18 1004  Last data filed at 11/04/18 0500   Gross per 24 hour   Intake             4179 ml   Output             2625 ml   Net             1554 ml     Physical Exam   Constitutional: She is oriented to person, place, and time. She appears well-developed.   HENT:   Head: Normocephalic and atraumatic.   Eyes: Pupils are equal, round, and reactive to light. Conjunctivae and EOM are normal.   Neck: Neck supple. No JVD present. No thyromegaly present.   Cardiovascular: Normal rate, regular rhythm, normal heart sounds and intact distal pulses.  Exam reveals no gallop and no friction rub.    No murmur heard.  Pulmonary/Chest: Effort normal. No respiratory distress. She has wheezes. She has no rales. She exhibits no tenderness.   Rhonchi bilateral.   Abdominal: Soft. Bowel sounds are normal. She exhibits no distension. There is no tenderness. There is no rebound and no guarding.   Musculoskeletal: Normal range of motion. She exhibits no edema, tenderness or deformity.   Lymphadenopathy:     She has no cervical adenopathy.   Neurological: She is alert and oriented to person, place, and time. She displays normal reflexes. No cranial nerve deficit. She exhibits abnormal muscle tone. Coordination abnormal.   Skin: Skin is warm and dry. Capillary refill takes 2 to 3 seconds. No rash noted.   Psychiatric: She has a normal mood and affect. Her behavior is normal. Judgment and thought content normal.   Nursing note and vitals reviewed.      Results Review:  Lab Results (last 24 hours)     Procedure Component Value Units Date/Time    Hepatitis Panel, Acute [[id number]]  (Normal) Collected:  11/03/18 1941    Specimen:  Blood Updated:  11/04/18 0921     HCV S/C Ratio 0.01     Hepatitis C Ab Negative     Hep A IgM Negative     Hep B C IgM Negative     Hepatitis B Surface Ag Negative    Blood Culture - Blood,  [[id number]]  (Normal) Collected:  11/03/18 2124    Specimen:  Blood from Arm, Left Updated:  11/04/18 0845     Blood Culture No growth at less than 24 hours    Blood Culture - Blood, [[id number]]  (Normal) Collected:  11/03/18 2124    Specimen:  Blood from Arm, Left Updated:  11/04/18 0845     Blood Culture No growth at less than 24 hours    Procalcitonin [[id number]]  (Abnormal) Collected:  11/04/18 0311    Specimen:  Blood Updated:  11/04/18 0615     Procalcitonin 0.51 (H) ng/mL     Narrative:       SIRS, sepsis, severe sepsis, and septic shock are categorized according to the criteria of the consensus conference of the American College of Chest Physicians/Society of Critical Care Medicine.    PCT < 0.5 ng/mL     Systemic infection (sepsis) is not likely.    PCT >0.5 and < 2.0 ng/mL Systemic infection (sepsis) is possible, but other conditions are known to elevate PCT as well.    PCT > 2.0 ng/mL     Systemic infection (sepsis) is likely, unless other causes are known.      PCT > 10.0 ng/mL    Important systemic inflammatory response, almost exclusively due to severe bacterial sepsis or septic shock.    PCT values of < 0.5 ng/mL do not exclude an infection, because localized infections (without systemic signs) may be associated with such low concentrations, or a systemic infection in its initial stages (<6 hours).  Increased PCT can occur without infection.  PCT concentrations between 0.5 and 2.0 ng/mL should be interpreted taking into account the patients history.  It is recommended to retest PCT within 6-24 hours if any concentrations < 2.0 ng/mL are obtained.    CBC & Differential [[id number]] Collected:  11/04/18 0311    Specimen:  Blood Updated:  11/04/18 0430    Narrative:       The following orders were created for panel order CBC & Differential.  Procedure                               Abnormality         Status                     ---------                               -----------         ------                      Manual Differential[[id number]]          Abnormal            Final result               CBC Auto Differential[[id number]]        Abnormal            Final result                 Please view results for these tests on the individual orders.    CBC Auto Differential [[id number]]  (Abnormal) Collected:  11/04/18 0311    Specimen:  Blood Updated:  11/04/18 0430     WBC 15.65 (H) 10*3/mm3      RBC 3.44 (L) 10*6/mm3      Hemoglobin 10.9 (L) g/dL      Hematocrit 31.7 (L) %      MCV 92.2 fL      MCH 31.7 pg      MCHC 34.4 g/dL      RDW 14.6 %      RDW-SD 49.1 fl      MPV 9.3 fL      Platelets 493 (H) 10*3/mm3     Manual Differential [[id number]]  (Abnormal) Collected:  11/04/18 0311    Specimen:  Blood Updated:  11/04/18 0430     Neutrophil % 93.1 (H) %      Lymphocyte % 2.0 (L) %      Monocyte % 3.0 (L) %      Atypical Lymphocyte % 2.0 %      Neutrophils Absolute 14.57 (H) 10*3/mm3      Lymphocytes Absolute 0.31 (L) 10*3/mm3      Monocytes Absolute 0.47 10*3/mm3      Poikilocytes Slight/1+     Toxic Granulation Large/3+     Vacuolated Neutrophils Mod/2+     Platelet Estimate Increased     Giant Platelets Slight/1+    Blood Gas, Arterial [[id number]]  (Abnormal) Collected:  11/04/18 0417    Specimen:  Arterial Blood Updated:  11/04/18 0426     Site Right Brachial     Dimitri's Test N/A     pH, Arterial 7.406 pH units      pCO2, Arterial 41.6 mm Hg      pO2, Arterial 88.6 mm Hg      HCO3, Arterial 26.1 (H) mmol/L      Base Excess, Arterial 1.3 mmol/L      O2 Saturation, Arterial 97.4 %      Temperature 37.0 C      Barometric Pressure for Blood Gas 752 mmHg      Modality Nasal Cannula     Flow Rate 4.0 lpm      Ventilator Mode NA     Collected by [id number]    Basic Metabolic Panel [[id number]]  (Abnormal) Collected:  11/04/18 0311    Specimen:  Blood Updated:  11/04/18 0420     Glucose 135 (H) mg/dL      BUN 7 mg/dL      Creatinine 0.47 (L) mg/dL      Sodium 141 mmol/L      Potassium 3.5 mmol/L      Chloride 105 mmol/L      CO2  22.0 (L) mmol/L      Calcium 7.6 (L) mg/dL      eGFR Non African Amer 134 mL/min/1.73      BUN/Creatinine Ratio 14.9     Anion Gap 14.0 (H) mmol/L     Narrative:       GFR Normal >60  Chronic Kidney Disease <60  Kidney Failure <15    Magnesium [[id number]]  (Normal) Collected:  11/04/18 0311    Specimen:  Blood Updated:  11/04/18 0420     Magnesium 1.6 mg/dL     Legionella Antigen, Urine - Urine, Urine, Clean Catch [[id number]]  (Normal) Collected:  11/04/18 0225    Specimen:  Urine from Urine, Clean Catch Updated:  11/04/18 0346     LEGIONELLA ANTIGEN, URINE Negative    S. Pneumo Ag Urine or CSF - Urine, Urine, Clean Catch [[id number]]  (Normal) Collected:  11/04/18 0225    Specimen:  Urine from Urine, Clean Catch Updated:  11/04/18 0345     Strep Pneumo Ag Negative    Respiratory Culture - Sputum, Cough [[id number]] Collected:  11/04/18 0140    Specimen:  Sputum from Cough Updated:  11/04/18 0320    Urinalysis With Culture If Indicated - Urine, Clean Catch [[id number]]  (Abnormal) Collected:  11/04/18 0225    Specimen:  Urine from Urine, Clean Catch Updated:  11/04/18 0256     Color, UA Yellow     Appearance, UA Clear     pH, UA <=5.0     Specific Gravity, UA 1.011     Glucose, UA Negative     Ketones, UA Negative     Bilirubin, UA Negative     Blood, UA Small (1+) (A)     Protein, UA Negative     Leuk Esterase, UA Negative     Nitrite, UA Negative     Urobilinogen, UA 0.2 E.U./dL    Urinalysis, Microscopic Only - Urine, Clean Catch [[id number]]  (Abnormal) Collected:  11/04/18 0225    Specimen:  Urine from Urine, Clean Catch Updated:  11/04/18 0256     RBC, UA 3-5 (A) /HPF      WBC, UA None Seen /HPF      Bacteria, UA None Seen /HPF      Squamous Epithelial Cells, UA None Seen /HPF      Hyaline Casts, UA None Seen /LPF      Methodology Automated Microscopy    Hemoglobin A1c [[id number]] Collected:  11/03/18 1941    Specimen:  Blood Updated:  11/04/18 0228     Hemoglobin A1C 5.9 %     Narrative:       Less than 6.0            Non-Diabetic Range  6.0-7.0                 ADA Therapeutic Target  Greater than 7.0        Action Suggested    Blood Gas, Arterial [[id number]]  (Abnormal) Collected:  11/04/18 0052    Specimen:  Arterial Blood Updated:  11/04/18 0100     Site Right Brachial     Dimitri's Test N/A     pH, Arterial 7.395 pH units      pCO2, Arterial 38.7 mm Hg      pO2, Arterial 75.2 (L) mm Hg      HCO3, Arterial 23.7 mmol/L      Base Excess, Arterial -1.0 (L) mmol/L      O2 Saturation, Arterial 95.5 %      Temperature 37.0 C      Barometric Pressure for Blood Gas 753 mmHg      Modality Nasal Cannula     Flow Rate 2.0 lpm      Ventilator Mode NA     Collected by [id number]    [place] Draw [[id number]] Collected:  11/03/18 1941    Specimen:  Blood Updated:  11/03/18 2046    Narrative:       The following orders were created for panel order [place] Draw.  Procedure                               Abnormality         Status                     ---------                               -----------         ------                     Light Blue Top[[id number]]                                   Final result               Green Top (Gel)[[id number]]                                  Final result               Lavender Top[[id number]]                                     Final result               Red Top[[id number]]                                          Final result                 Please view results for these tests on the individual orders.    Light Blue Top [[id number]] Collected:  11/03/18 1941    Specimen:  Blood Updated:  11/03/18 2046     Extra Tube hold for add-on     Comment: Auto resulted       Green Top (Gel) [[id number]] Collected:  11/03/18 1941    Specimen:  Blood Updated:  11/03/18 2046     Extra Tube Hold for add-ons.     Comment: Auto resulted.       Lavender Top [[id number]] Collected:  11/03/18 1941    Specimen:  Blood Updated:  11/03/18 2046     Extra Tube hold for add-on     Comment: Auto resulted       Red Top [[id number]] Collected:   11/03/18 1941    Specimen:  Blood Updated:  11/03/18 2046     Extra Tube Hold for add-ons.     Comment: Auto resulted.       Procalcitonin [[id number]]  (Abnormal) Collected:  11/03/18 1941    Specimen:  Blood Updated:  11/03/18 2033     Procalcitonin 0.44 (H) ng/mL     Narrative:       SIRS, sepsis, severe sepsis, and septic shock are categorized according to the criteria of the consensus conference of the American College of Chest Physicians/Society of Critical Care Medicine.    PCT < 0.5 ng/mL     Systemic infection (sepsis) is not likely.    PCT >0.5 and < 2.0 ng/mL Systemic infection (sepsis) is possible, but other conditions are known to elevate PCT as well.    PCT > 2.0 ng/mL     Systemic infection (sepsis) is likely, unless other causes are known.      PCT > 10.0 ng/mL    Important systemic inflammatory response, almost exclusively due to severe bacterial sepsis or septic shock.    PCT values of < 0.5 ng/mL do not exclude an infection, because localized infections (without systemic signs) may be associated with such low concentrations, or a systemic infection in its initial stages (<6 hours).  Increased PCT can occur without infection.  PCT concentrations between 0.5 and 2.0 ng/mL should be interpreted taking into account the patients history.  It is recommended to retest PCT within 6-24 hours if any concentrations < 2.0 ng/mL are obtained.    C-reactive Protein [[id number]]  (Abnormal) Collected:  11/03/18 1941    Specimen:  Blood Updated:  11/03/18 2024     C-Reactive Protein 24.90 (H) mg/dL     CBC & Differential [[id number]] Collected:  11/03/18 1941    Specimen:  Blood Updated:  11/03/18 2019    Narrative:       The following orders were created for panel order CBC & Differential.  Procedure                               Abnormality         Status                     ---------                               -----------         ------                     Manual Differential[[id number]]          Abnormal             Final result               CBC Auto Differential[[id number]]        Abnormal            Final result                 Please view results for these tests on the individual orders.    CBC Auto Differential [[id number]]  (Abnormal) Collected:  11/03/18 1941    Specimen:  Blood Updated:  11/03/18 2019     WBC 19.33 (H) 10*3/mm3      RBC 4.10 (L) 10*6/mm3      Hemoglobin 12.7 g/dL      Hematocrit 37.8 %      MCV 92.2 fL      MCH 31.0 pg      MCHC 33.6 g/dL      RDW 14.2 %      RDW-SD 48.2 fl      MPV 9.1 fL      Platelets 551 (H) 10*3/mm3     Narrative:       The previously reported component NRBC is no longer being reported.    Manual Differential [[id number]]  (Abnormal) Collected:  11/03/18 1941    Specimen:  Blood Updated:  11/03/18 2019     Neutrophil % 66.3 %      Lymphocyte % 11.9 (L) %      Monocyte % 13.9 (H) %      Eosinophil % 3.0 %      Basophil % 1.0 %      Bands %  2.0 %      Myelocyte % 1.0 (H) %      Atypical Lymphocyte % 1.0 %      Neutrophils Absolute 13.21 (H) 10*3/mm3      Lymphocytes Absolute 2.30 10*3/mm3      Monocytes Absolute 2.69 (H) 10*3/mm3      Eosinophils Absolute 0.58 10*3/mm3      Basophils Absolute 0.19 10*3/mm3      Polychromasia Slight/1+     Target Cells Slight/1+     Toxic Granulation Mod/2+     Vacuolated Neutrophils Mod/2+     Platelet Estimate Increased    BNP [[id number]]  (Normal) Collected:  11/03/18 1941    Specimen:  Blood Updated:  11/03/18 2016     proBNP 137.0 pg/mL     Troponin [[id number]]  (Normal) Collected:  11/03/18 1941    Specimen:  Blood Updated:  11/03/18 2016     Troponin I <0.012 ng/mL     Influenza Antigen, Rapid - Swab, Nasopharynx [[id number]]  (Normal) Collected:  11/03/18 1958    Specimen:  Swab from Nasopharynx Updated:  11/03/18 2015     Influenza A Ag, EIA Negative     Influenza B Ag, EIA Negative    Narrative:         Recommend confirmation of negative results by viral culture or molecular assay.    Comprehensive Metabolic Panel [[id number]]  (Abnormal)  Collected:  11/03/18 1941    Specimen:  Blood Updated:  11/03/18 2008     Glucose 144 (H) mg/dL      BUN 10 mg/dL      Creatinine 0.59 mg/dL      Sodium 139 mmol/L      Potassium 4.0 mmol/L      Chloride 98 mmol/L      CO2 26.0 mmol/L      Calcium 9.4 mg/dL      Total Protein 7.8 g/dL      Albumin 3.90 g/dL      ALT (SGPT) 187 (H) U/L      AST (SGOT) 179 (H) U/L      Alkaline Phosphatase 946 (H) U/L      Total Bilirubin 0.6 mg/dL      eGFR Non African Amer 103 mL/min/1.73      Globulin 3.9 gm/dL      A/G Ratio 1.0 (L) g/dL      BUN/Creatinine Ratio 16.9     Anion Gap 15.0 (H) mmol/L     Blood Gas, Arterial [[id number]]  (Abnormal) Collected:  11/03/18 1950    Specimen:  Arterial Blood Updated:  11/03/18 2008     Site Right Brachial     Dimitri's Test N/A     pH, Arterial 7.474 (H) pH units      pCO2, Arterial 33.3 (L) mm Hg      pO2, Arterial 52.7 (C) mm Hg      HCO3, Arterial 24.5 mmol/L      Base Excess, Arterial 1.3 mmol/L      O2 Saturation, Arterial 90.5 (L) %      Temperature 37.0 C      Barometric Pressure for Blood Gas 754 mmHg      Modality Room Air     Ventilator Mode NA     Notified [facility] CHERYL OTERO     Notified By [id number]     Notified Time 11/03/2018 20:08     Collected by [id number]    D-dimer, Quantitative [[id number]]  (Abnormal) Collected:  11/03/18 1941    Specimen:  Blood Updated:  11/03/18 2006     D-Dimer, Quantitative 2.14 (H) mg/L (FEU)     Narrative:       Reference Range is 0-0.50 mg/L FEU. However, results <0.50 mg/L FEU tends to rule out DVT or PE. Results >0.50 mg/L FEU are not useful in predicting absence or presence of DVT or PE.    Lactic Acid, Plasma [[id number]]  (Normal) Collected:  11/03/18 1941    Specimen:  Blood Updated:  11/03/18 2004     Lactate 1.5 mmol/L            Cultures:  Blood Culture   Date Value Ref Range Status   11/03/2018 No growth at less than 24 hours  Preliminary   11/03/2018 No growth at less than 24 hours  Preliminary       Radiology Data:    Imaging Results (last 24  hours)     Procedure Component Value Units Date/Time    CT Angiogram Chest With Contrast [[id number]] Collected:  11/03/18 2053     Updated:  11/03/18 2100    Narrative:       CT ANGIOGRAM CHEST W CONTRAST- 11/3/2018 8:33 PM CDT      HISTORY: Shortness of breath      COMPARISON: None.      DOSE LENGTH PRODUCT: 259 mGy cm. Automated exposure control was also  utilized to decrease patient radiation dose.     TECHNIQUE: Helical tomographic images of the chest were obtained after  the administration of intravenous contrast following angiogram protocol.  Additionally, 3D and multiplanar reformatted images were provided.        FINDINGS:    Pulmonary arteries: There is adequate enhancement of the pulmonary  arteries to evaluate for central and segmental pulmonary emboli. There  are no filling defects within the main, lobar, segmental or visualized  subsegmental pulmonary arteries. The pulmonary arteries are within  normal limits for size.      Aorta and great vessels: The aorta is well opacified and demonstrates no  dissection or aneurysm. The great vessels are normal in appearance.     Visualized neck base: The imaged portion of the base of the neck appears  unremarkable.      Lungs: Diffuse centrilobular nodules are seen in bilateral lungs. There  is also consolidation in the lingula. The trachea and bronchial tree are  patent.      Heart: The heart is normal in size. There is no pericardial effusion.      Mediastinum and lymph nodes: Mediastinal and bilateral hilar  lymphadenopathy is present.      Skeletal and soft tissues: Degenerative changes are seen in the lower  thoracic spine.     Upper abdomen: The imaged portion of the upper abdomen demonstrates no  acute process.        Impression:       1. Diffuse nodular opacities in bilateral lungs are likely due to  disseminated granulomatous infection. Miliary TB should be considered.  Hematogenous spread of malignancy is another consideration.  2. Lingular consolidation  may represent focal pneumonia or atelectasis.  3. Mediastinal and hilar lymphadenopathy could be neoplastic or  reactive.        This report was finalized on 11/03/2018 20:57 by Dr. Vidal Maher MD.    XR Chest 2 View [[id number]] Collected:  11/03/18 1951     Updated:  11/1955    Narrative:       XR CHEST 2 VW- 11/3/2018 7:43 PM CDT     HISTORY: SOA  triage protocol      COMPARISON: 10/20/2017     FINDINGS:   Upright frontal and lateral radiographs of the chest were obtained.     There is consolidation in the lingula. Diffuse interstitial opacities  are also present. The cardiomediastinal silhouette and pulmonary  vascularity are unchanged. The osseous structures and surrounding soft  tissues demonstrate no acute abnormality.       Impression:       1. LEFT lingular consolidation most likely represents pneumonia.  2. Interstitial opacities in bilateral lungs could be due to  interstitial pneumonia or edema.  This report was finalized on 11/03/2018 19:52 by Dr. Vidal Maher MD.          No Known Allergies    Scheduled meds:     budesonide-formoterol 2 puff Inhalation BID - RT   cefepime 2 g Intravenous Q8H   famotidine 40 mg Oral Daily   heparin (porcine) 5,000 Units Subcutaneous Q8H   ipratropium-albuterol 3 mL Nebulization 4x Daily - RT   methylPREDNISolone sodium succinate 40 mg Intravenous Q6H   sodium chloride 3 mL Intravenous Q12H   vancomycin 500 mg Intravenous Q12H       PRN meds:  influenza vaccine  •  Pharmacy Consult - Pharmacy to dose  •  pneumococcal polysaccharide 23-valent  •  sodium chloride  •  sodium chloride    Assessment/Plan       Sepsis (CMS/[facility])      Plan:  Sepsis/Pneumonia/acute hypoxic respiratory failure/chronic COPD with exacerbation.  Continue BiPAP.  Duo nebs.  Vancomycin and cefepime.  Symbicort.  Solu-Medrol.    Pulmonary edema.  1 dose of Lasix given last night.  Echocardiogram.    Diffuse nodular pasty in by the lungs.  CTA-diffuse nodular opacities of bilateral lungs,  likely due to disseminated granulomatous disease infection-possible  TB or possible malignancy, lingular consolidation, mediastinal and hilar lymphadenopathy. Consult pulmonary. Quanterferon Gold to rule out tuberculosis, HIV lab pending.   Patient was incarcerated about year ago for 6 months for unknown reason.  Patient's was also recently test for TB about month ago at [facility]-negative.  Patient was admitted there and treated for pneumonia.    Elevated liver enzyme.  Ultrasound the liver.  Acute hepatitis panel- neg.  Lipid profile.     Hyperglycemia.  Hemoglobin A1c 5.9.    Elevated d-dimer.  CTA negative for pulmonary embolus.  Doppler ultrasound of lower extremities pending.    Tobacco abuse.  Tobacco sensation counseling.  Pt refused Nicotine patch.    Blood culture no growth less than 24 hours.  Flu screen is negative.  Legionella antigen negative.  Respiratory culture pending.  Strep no antigen negative.    Discharge Planning:  Unknown.     Alvin Weber MD   11/04/18   10:04 AM

## 2018-11-04 NOTE — CONSULTS
PULMONARY & CRITICAL CARE CONSULT - [facility]    18, 11:43 AM  Patient Care Team:  Provider, No Known as PCP - General  Name: Tea Sams  : 1955  MRN: [id number]  Contact Serial Number [id number]    Chief complaint: Dyspnea  HPI:  We have been consulted by Alvin Weber MD to see this 63 y.o. female born on 1955.  Patient complains of dyspnea and cough in the entirety of, chest for 1 month. Severity: worsening.  Aggravating factors: walking.   Alleviating factors: medication(s) (none) Associated symptoms: chills, fatigue, fevers, poor exercise tolerance, sweats and weakness. Sputum is discolored.  Patient currently is not on home oxygen therapy.. Respiratory history: COPD and Heavy smoking history for more than 40 years currently at 1 pack per day.  The patient reports that she was treated at [facility] earlier this month and hospitalized for just over one day.  She then went home and did not get any better.  She reports that when she returned to work she was having great difficulty walking from her home to her job at [employer].  She says she was working 6 out of 7 days and was finding it difficult to work a 6 hour shift due to the dyspnea and cough.  She has not had any hemoptysis.  A CTA of the chest shows diffuse nodules bilaterally.  The radiologist was concerned that this could possibly be TB.  The patient reports she was previously incarcerated and had a negative TB skin test at that time.  When the patient was a child, her grandmother had TB but to her knowledge she has not been around anybody recently with known TB. Quantiferon Gold results are pending.  She is in contact and airborne isolation.  The patient was admitted with a positive sepsis screen, leukocytosis and mild hypoxia on ABGs.  On exam she appears very ill,  in mild pulmonary distress, on supplemental oxygen.    Past Medical History:  Past Medical History:   Diagnosis Date   • Arthritis     • COPD (chronic obstructive pulmonary disease) (CMS/HCC)    • GERD (gastroesophageal reflux disease)      Past Surgical History:   Procedure Laterality Date   • ABDOMINAL SURGERY     •  SECTION     • ENDOSCOPY N/A 10/20/2017    Procedure: ESOPHAGOGASTRODUODENOSCOPY WITH ANESTHESIA;  Surgeon: Femi Goddard MD;  Location: [facility];  Service:    • HERNIA REPAIR     • TONSILLECTOMY       No Known Allergies  Medications:    budesonide-formoterol 2 puff Inhalation BID - RT   cefepime 2 g Intravenous Q8H   famotidine 20 mg Oral BID   heparin (porcine) 5,000 Units Subcutaneous Q8H   ipratropium-albuterol 3 mL Nebulization 4x Daily - RT   methylPREDNISolone sodium succinate 40 mg Intravenous Q6H   sodium chloride 3 mL Intravenous Q12H   vancomycin 1,000 mg Intravenous Q24H       Pharmacy Consult - Pharmacy to dose     sodium chloride 125 mL/hr Last Rate: 125 mL/hr (18)   sodium chloride 150 mL/hr Last Rate: 150 mL/hr (18)     Family History:  Family History   Problem Relation Age of Onset   • Cancer Mother    • Cancer Father      Social History:   reports that she has been smoking Cigarettes.  She has a 40.00 pack-year smoking history. She does not have any smokeless tobacco history on file. She reports that she does not drink alcohol or use drugs.    Review of Systems:  Review of Systems   Constitutional: Positive for activity change, appetite change, chills, fatigue, fever and unexpected weight change.   HENT: Negative.    Eyes: Negative.    Respiratory: Positive for cough, chest tightness, shortness of breath and wheezing.    Cardiovascular: Positive for chest pain (Associated with cough).   Gastrointestinal: Positive for abdominal distention and abdominal pain.   Genitourinary: Negative.    Skin: Negative.    Neurological: Positive for weakness.   Hematological: Negative.    Psychiatric/Behavioral: Negative.         Physical Exam:  Temp:  [98.9 °F (37.2 °C)-99.5 °F (37.5 °C)] 98.9 °F  (37.2 °C)  Heart Rate:  [] 100  Resp:  [15-32] 20  BP: (107-150)/(52-99) 128/76  Intake/Output Summary (Last 24 hours) at 11/04/18 1143  Last data filed at 11/04/18 0500   Gross per 24 hour   Intake             4179 ml   Output             2625 ml   Net             1554 ml     1    11/03/18  1932 11/04/18  0000   Weight: 47.6 kg (105 lb) 44 kg (97 lb)     SpO2 Percentage    11/04/18 0630 11/04/18 0802 11/04/18 0809   SpO2: 98% 100% 100%     Physical Exam   Constitutional: She is oriented to person, place, and time. She appears well-developed and well-nourished. She has a sickly appearance. She appears distressed. Nasal cannula in place.   HENT:   Head: Normocephalic and atraumatic.   Eyes: Pupils are equal, round, and reactive to light. No scleral icterus.   Neck: Normal range of motion. Neck supple.   Cardiovascular: Normal rate and regular rhythm.    Pulmonary/Chest: Accessory muscle usage present. Tachypnea noted. She is in respiratory distress (mild). She has wheezes. She has rhonchi.   Abdominal: Soft. Bowel sounds are normal. She exhibits no distension.   Genitourinary:   Genitourinary Comments: Arriaga catheter   Musculoskeletal: Normal range of motion. She exhibits no edema.   Neurological: She is alert and oriented to person, place, and time.   Skin: Skin is warm and dry. There is pallor.   Psychiatric: She has a normal mood and affect.   Nursing note and vitals reviewed.         Results from last 7 days  Lab Units 11/04/18 0311 11/03/18 1941   WBC 10*3/mm3 15.65* 19.33*   HEMOGLOBIN g/dL 10.9* 12.7   PLATELETS 10*3/mm3 493* 551*       Results from last 7 days  Lab Units 11/04/18 0311 11/03/18 1941   SODIUM mmol/L 141 139   POTASSIUM mmol/L 3.5 4.0   CO2 mmol/L 22.0* 26.0   BUN mg/dL 7 10   CREATININE mg/dL 0.47* 0.59   MAGNESIUM mg/dL 1.6  --    GLUCOSE mg/dL 135* 144*       Results from last 7 days  Lab Units 11/04/18  0417 11/04/18  0052 11/03/18  1950   PH, ARTERIAL pH units 7.406 7.395 7.474*    PCO2, ARTERIAL mm Hg 41.6 38.7 33.3*   PO2 ART mm Hg 88.6 75.2* 52.7*     Lab Results   Component Value Date    PROBNP 137.0 11/03/2018     Blood Culture   Date Value Ref Range Status   11/03/2018 No growth at less than 24 hours  Preliminary   11/03/2018 No growth at less than 24 hours  Preliminary     Recent radiology:   Imaging Results (last 72 hours)     Procedure Component Value Units Date/Time    CT Angiogram Chest With Contrast [[id number]] Collected:  11/03/18 2053     Updated:  11/03/18 2100    Narrative:       CT ANGIOGRAM CHEST W CONTRAST- 11/3/2018 8:33 PM CDT      HISTORY: Shortness of breath      COMPARISON: None.      DOSE LENGTH PRODUCT: 259 mGy cm. Automated exposure control was also  utilized to decrease patient radiation dose.     TECHNIQUE: Helical tomographic images of the chest were obtained after  the administration of intravenous contrast following angiogram protocol.  Additionally, 3D and multiplanar reformatted images were provided.        FINDINGS:    Pulmonary arteries: There is adequate enhancement of the pulmonary  arteries to evaluate for central and segmental pulmonary emboli. There  are no filling defects within the main, lobar, segmental or visualized  subsegmental pulmonary arteries. The pulmonary arteries are within  normal limits for size.      Aorta and great vessels: The aorta is well opacified and demonstrates no  dissection or aneurysm. The great vessels are normal in appearance.     Visualized neck base: The imaged portion of the base of the neck appears  unremarkable.      Lungs: Diffuse centrilobular nodules are seen in bilateral lungs. There  is also consolidation in the lingula. The trachea and bronchial tree are  patent.      Heart: The heart is normal in size. There is no pericardial effusion.      Mediastinum and lymph nodes: Mediastinal and bilateral hilar  lymphadenopathy is present.      Skeletal and soft tissues: Degenerative changes are seen in the  lower  thoracic spine.     Upper abdomen: The imaged portion of the upper abdomen demonstrates no  acute process.        Impression:       1. Diffuse nodular opacities in bilateral lungs are likely due to  disseminated granulomatous infection. Miliary TB should be considered.  Hematogenous spread of malignancy is another consideration.  2. Lingular consolidation may represent focal pneumonia or atelectasis.  3. Mediastinal and hilar lymphadenopathy could be neoplastic or  reactive.        This report was finalized on 11/03/2018 20:57 by Dr. Vidal Maher MD.    XR Chest 2 View [[id number]] Collected:  11/03/18 1951     Updated:  [birth year]1955    Narrative:       XR CHEST 2 VW- 11/3/2018 7:43 PM CDT     HISTORY: SOA  triage protocol      COMPARISON: 10/20/2017     FINDINGS:   Upright frontal and lateral radiographs of the chest were obtained.     There is consolidation in the lingula. Diffuse interstitial opacities  are also present. The cardiomediastinal silhouette and pulmonary  vascularity are unchanged. The osseous structures and surrounding soft  tissues demonstrate no acute abnormality.       Impression:       1. LEFT lingular consolidation most likely represents pneumonia.  2. Interstitial opacities in bilateral lungs could be due to  interstitial pneumonia or edema.  This report was finalized on 11/03/2018 19:52 by Dr. Vidal Maher MD.        My radiograph interpretation/independent review of imaging: Diffuse nodules bilaterally  Other test results (not lab or imaging): None  Independent review of ekg: Sinus tachycardia 113,   Patient Active Problem List   Diagnosis   • Sepsis (CMS/[facility])     Pulmonary Assessment:  New problem (to me), with additional workup planned: Acute hypoxemic respiratory distress in a patient with positive sepsis screen, probable pneumonia    New problem (to me), no additional workup planned: None    Other problems either stable, failing to improve or  worsenin. Pleuritic-type chest pain  2. Diffuse lung nodules bilaterally, rule out TB  3. Recent incarceration  4. COPD, she could very well have underlying asthma as well  5. Leukocytosis   6. Tobacco abuse  7. Elevated d-dimer    Recommend/plan:   · Continue airborne and contact isolation, await Quant Gold results.   · Sputum cultures in progress  · PCR viral swab pending  · Negative results flu, S pneumo antigen and Legionella antigen  · Continue breathing treatments and Symbicort  · Continue antibiotic coverage with cefepime, Zosyn and vanc  · Current Solu-Medrol dosing 40 mg 4 times a day, this may be increased if not improved.  · Supplemental oxygen to keep sats >92%.  Use BiPAP as needed for respiratory distress.  · Echocardiogram pending  · Ultrasound liver is pending   · lower extremity Doppler studies are pending  · HIV results are pending  · Further recommendations per Dr. Denis    Electronically signed by HORTENCIA Thibodeaux, 18, 11:44 AM  Physician substantive contribution:  Pertinent symptoms/interval history include: She currently is resting comfortably on oxygen via nasal cannula.  Respiratory exam shows pertinent findings of wheezes and rhonchi on chest exam.  Plan includes: Workup regarding the etiology of her abnormal chest imaging studies is in progress.  I would recommend infectious disease consultation.  I have seen and examined patient personally, performing a face-to-face diagnostic evaluation with plan of care reviewed and developed with APRN and nursing staff. I have addended and/or modified the above history of present illness, physical examination, and assessment and plan to reflect my findings and impressions. Essential elements of the care plan were discussed with APRN above.  Agree with findings and assessment/plan as documented above.    Electronically signed by Melchor Denis MD, on 2018, 3:26 PM

## 2018-11-04 NOTE — CONSULTS
"Pharmacy Dosing Service  Pharmacokinetics  Vancomycin Initial Evaluation    Assessment/Action/Plan:  Initiated Vancomycin 500 mg IVPB every 12 hours. Vancomycin trough ordered prior to 4th dose on 11/5/18 before 0900 dose. Pharmacy will monitor renal function and adjust dose accordingly.     Subjective:  Tea Sams is a 63 y.o. female with a Vancomycin \"Pharmacy to Dose\" consult for the treatment of sepsis/PNA . Day 1 of therapy, scheduled to end 11/9.    Objective:  Ht: 165.1 cm (65\"); Wt: 44 kg (97 lb)  Estimated Creatinine Clearance: 67.8 mL/min (by C-G formula based on SCr of 0.59 mg/dL).   Lab Results   Component Value Date    CREATININE 0.59 11/03/2018    CREATININE 0.80 10/20/2017    CREATININE 0.51 02/28/2015      Lab Results   Component Value Date    WBC 19.33 (H) 11/03/2018    WBC 15.49 (H) 10/20/2017    WBC 12.57 (H) 03/03/2015      Baseline culture results:  Microbiology Results (last 10 days)       Procedure Component Value - Date/Time    Influenza Antigen, Rapid - Swab, Nasopharynx [[id number]]  (Normal) Collected:  11/03/18 1958    Lab Status:  Final result Specimen:  Swab from Nasopharynx Updated:  11/03/18 2015     Influenza A Ag, EIA Negative     Influenza B Ag, EIA Negative    Narrative:         Recommend confirmation of negative results by viral culture or molecular assay.            Charlotte Dominguez RPH  11/04/18 12:34 AM    "

## 2018-11-04 NOTE — PLAN OF CARE
Problem: Patient Care Overview  Goal: Plan of Care Review  Outcome: Ongoing (interventions implemented as appropriate)    Goal: Interprofessional Rounds/Family Conf  Outcome: Ongoing (interventions implemented as appropriate)      Problem: Respiratory Distress Syndrome ([place],NICU)  Goal: Signs and Symptoms of Listed Potential Problems Will be Absent, Minimized or Managed (Respiratory Distress Syndrome)  Outcome: Ongoing (interventions implemented as appropriate)  Patient's shortness of breath has subsided somewhat from when she was received from the emergency department,  Has been given antibiotics, steroids, and IV lasix 60mg shortly after arriving in the unit.. Client has had a large output from the lasix.  Shortness of breath has decreased and patient is able to rest between intervals from getting off and on the bedpan.  She has a slight low grade temperature  at 99.5 orally. Her vital signs have been stable.  Heart rate and rhythm have been sinus tach in the 110's without ectopic beats.  Blood pressure has been stable and within normal limits.  Denies any chest pain, does complain of chest soreness at times with continuous  productive coughing.  Sputum sent to the lab, along with a urine for a urinalysis.  Iv antibiotics hung while NS is infusing at 125cc/hr and have been increased to 150cc/hr per orders.

## 2018-11-04 NOTE — H&P
[facility] Medicine Admission      Date of Admission: 11/3/2018      Primary Care Physician: Provider, No Known    Chief compliant: cough, fever, weakness.    HPI: Patient is a 63-year-old white female has a concurrent health history significant for COPD, arthritis, neuropathy, endometriosis, and heavy and ongoing tobacco abuse.  The patient also was seen at [facility] in the past month and a half for pneumonia.  The patient states that over the last week she has been very short of breath and has felt very ill and weak.  The patient admits to a runny nose, sneezing and productive cough.  The patient also has had evening sweats and fever.  The patient presents to the emergency room and was found to qualify for sepsis protocol secondary to what appears to be pneumonia.  Patient has a pulse of 117, her respirations are 28 times a minute and her white blood cell count is at 19,000.  On her CTA of her chest- there was Diffuse nodular opacities in bilateral lungs are likely due to disseminated granulomatous infection. Miliary TB should be considered. Hematogenous spread of malignancy is another consideration.  The patient has had exposure to presence and gels, but the patient denies having a prior history of TB or known malignancy.  The patient will be made to the ICU for further monitoring and management.    Past Medical History:   Past Medical History:   Diagnosis Date   • Arthritis    • COPD (chronic obstructive pulmonary disease) (CMS/HCC)        Past Surgical History:   Past Surgical History:   Procedure Laterality Date   •  SECTION     • ENDOSCOPY N/A 10/20/2017    Procedure: ESOPHAGOGASTRODUODENOSCOPY WITH ANESTHESIA;  Surgeon: Femi Goddard MD;  Location: [facility];  Service:    • HERNIA REPAIR     • TONSILLECTOMY         Family History:   Family History   Problem Relation Age of Onset   • Cancer Mother    • Cancer Father        Social History:   Social History      Social History   • Marital status: Single     Social History Main Topics   • Smoking status: Current Every Day Smoker     Packs/day: 1.00     Years: 40.00     Types: Cigarettes   • Alcohol use No   • Drug use: No     Other Topics Concern   • Not on file       Allergies: No Known Allergies    Medications:   Prior to Admission medications    Medication Sig Start Date End Date Taking? Authorizing Provider   naproxen (NAPROSYN) 500 MG tablet Take 1 tablet by mouth Every 12 (Twelve) Hours As Needed for Moderate Pain . 5/26/18   Jenny Ojeda APRN       Review of Systems:    Constitutional:  positive for fever and chills.  Positive for weakness and fatigue.  HENT: Positive for congestion, ear pain.  Eyes: Negative for photophobia, pain, discharge and visual disturbance.   Respiratory: Positive for cough, shortness of breath and wheezing.    Cardiovascular: Negative for chest pain and palpitations.   Gastrointestinal: Positive for abdominal pain, negative for blood in stool, diarrhea, nausea and vomiting.   Endocrine: Negative for polydipsia and polyuria.   Genitourinary: Negative for difficulty urinating, dysuria, frequency, hematuria and urgency.   Skin: Negative for rash.  negative for open sores or wounds.  Neurological: Negative for dizziness, syncope.      Physical Exam:    Temp:  [99.3 °F (37.4 °C)] 99.3 °F (37.4 °C)  Heart Rate:  [117-129] 117  Resp:  [15-28] 22  BP: (107-135)/(52-74) 107/52    General: The patient is in moderate respiratory distress, she is breathing 28 times per minute.  The patient is ill appearing and very uncomfortable.  HEENT: Head: Normocephalic and atraumatic.  The right and the left tympanic membrane are swollen, gray, and have effusion.  There also is slight erythema that is noted behind each tympanic membrane.    Eyes: Conjunctivae and EOM are normal. Pupils are equal, round, and reactive to light. Right eye exhibits no discharge. Left eye exhibits no discharge.   Neck:  Normal range of motion. Neck supple. No JVD present. No tracheal deviation present. No thyromegaly present.   Heart: tachycardic rate 115, regular rhythm, normal heart sounds and intact distal pulses.  Exam reveals no gallop and no friction rub. No murmur heard.  Pulmonary: The patient has coarse breath sounds throughout.  The patient has coarse transmitted upper airway sounds.  The patient has a diffuse wheezing appreciated.  The patient has crackles noted at the bases.  Breathing 28 times per minute.  Abdominal: Soft, Bowel sounds are normal.  The patient has moderate tenderness noted in the right lower quadrant.  The patient is without peritoneal signs.    Musculoskeletal: No cyanosis, clubbing or edema.  Lymph: No cervical adenopathy.   Neurological: Patient is alert and oriented to person, place, and time.  Skin: Skin is warm. No rash noted.       Nursing note and vitals reviewed.    Results Reviewed:  I have personally reviewed current lab, radiology, and data and agree with results.  Lab Results (last 24 hours)     Procedure Component Value Units Date/Time    Blood Culture - Blood, [[id number]] Collected:  11/03/18 2124    Specimen:  Blood from Arm, Left Updated:  11/03/18 2132    Blood Culture - Blood, [[id number]] Collected:  11/03/18 2124    Specimen:  Blood from Arm, Left Updated:  11/03/18 2132    [place] Draw [[id number]] Collected:  11/03/18 1941    Specimen:  Blood Updated:  11/03/18 2046    Narrative:       The following orders were created for panel order [place] Draw.  Procedure                               Abnormality         Status                     ---------                               -----------         ------                     Light Blue Top[[id number]]                                   Final result               Green Top (Gel)[[id number]]                                  Final result               Lavender Top[[id number]]                                     Final result               Red  Top[[id number]]                                          Final result                 Please view results for these tests on the individual orders.    Light Blue Top [[id number]] Collected:  11/03/18 1941    Specimen:  Blood Updated:  11/03/18 2046     Extra Tube hold for add-on     Comment: Auto resulted       Green Top (Gel) [[id number]] Collected:  11/03/18 1941    Specimen:  Blood Updated:  11/03/18 2046     Extra Tube Hold for add-ons.     Comment: Auto resulted.       Lavender Top [[id number]] Collected:  11/03/18 1941    Specimen:  Blood Updated:  11/03/18 2046     Extra Tube hold for add-on     Comment: Auto resulted       Red Top [[id number]] Collected:  11/03/18 1941    Specimen:  Blood Updated:  11/03/18 2046     Extra Tube Hold for add-ons.     Comment: Auto resulted.       Procalcitonin [[id number]]  (Abnormal) Collected:  11/03/18 1941    Specimen:  Blood Updated:  11/03/18 2033     Procalcitonin 0.44 (H) ng/mL     Narrative:       SIRS, sepsis, severe sepsis, and septic shock are categorized according to the criteria of the consensus conference of the American College of Chest Physicians/Society of Critical Care Medicine.    PCT < 0.5 ng/mL     Systemic infection (sepsis) is not likely.    PCT >0.5 and < 2.0 ng/mL Systemic infection (sepsis) is possible, but other conditions are known to elevate PCT as well.    PCT > 2.0 ng/mL     Systemic infection (sepsis) is likely, unless other causes are known.      PCT > 10.0 ng/mL    Important systemic inflammatory response, almost exclusively due to severe bacterial sepsis or septic shock.    PCT values of < 0.5 ng/mL do not exclude an infection, because localized infections (without systemic signs) may be associated with such low concentrations, or a systemic infection in its initial stages (<6 hours).  Increased PCT can occur without infection.  PCT concentrations between 0.5 and 2.0 ng/mL should be interpreted taking into account the patients history.  It is  recommended to retest PCT within 6-24 hours if any concentrations < 2.0 ng/mL are obtained.    C-reactive Protein [[id number]]  (Abnormal) Collected:  11/03/18 1941    Specimen:  Blood Updated:  11/03/18 2024     C-Reactive Protein 24.90 (H) mg/dL     CBC & Differential [[id number]] Collected:  11/03/18 1941    Specimen:  Blood Updated:  11/03/18 2019    Narrative:       The following orders were created for panel order CBC & Differential.  Procedure                               Abnormality         Status                     ---------                               -----------         ------                     Manual Differential[[id number]]          Abnormal            Final result               CBC Auto Differential[[id number]]        Abnormal            Final result                 Please view results for these tests on the individual orders.    CBC Auto Differential [[id number]]  (Abnormal) Collected:  11/03/18 1941    Specimen:  Blood Updated:  11/03/18 2019     WBC 19.33 (H) 10*3/mm3      RBC 4.10 (L) 10*6/mm3      Hemoglobin 12.7 g/dL      Hematocrit 37.8 %      MCV 92.2 fL      MCH 31.0 pg      MCHC 33.6 g/dL      RDW 14.2 %      RDW-SD 48.2 fl      MPV 9.1 fL      Platelets 551 (H) 10*3/mm3     Narrative:       The previously reported component NRBC is no longer being reported.    Manual Differential [[id number]]  (Abnormal) Collected:  11/03/18 1941    Specimen:  Blood Updated:  11/03/18 2019     Neutrophil % 66.3 %      Lymphocyte % 11.9 (L) %      Monocyte % 13.9 (H) %      Eosinophil % 3.0 %      Basophil % 1.0 %      Bands %  2.0 %      Myelocyte % 1.0 (H) %      Atypical Lymphocyte % 1.0 %      Neutrophils Absolute 13.21 (H) 10*3/mm3      Lymphocytes Absolute 2.30 10*3/mm3      Monocytes Absolute 2.69 (H) 10*3/mm3      Eosinophils Absolute 0.58 10*3/mm3      Basophils Absolute 0.19 10*3/mm3      Polychromasia Slight/1+     Target Cells Slight/1+     Toxic Granulation Mod/2+     Vacuolated Neutrophils  Mod/2+     Platelet Estimate Increased    BNP [[id number]]  (Normal) Collected:  11/03/18 1941    Specimen:  Blood Updated:  11/03/18 2016     proBNP 137.0 pg/mL     Troponin [[id number]]  (Normal) Collected:  11/03/18 1941    Specimen:  Blood Updated:  11/03/18 2016     Troponin I <0.012 ng/mL     Influenza Antigen, Rapid - Swab, Nasopharynx [[id number]]  (Normal) Collected:  11/03/18 1958    Specimen:  Swab from Nasopharynx Updated:  11/03/18 2015     Influenza A Ag, EIA Negative     Influenza B Ag, EIA Negative    Narrative:         Recommend confirmation of negative results by viral culture or molecular assay.    Comprehensive Metabolic Panel [[id number]]  (Abnormal) Collected:  11/03/18 1941    Specimen:  Blood Updated:  11/03/18 2008     Glucose 144 (H) mg/dL      BUN 10 mg/dL      Creatinine 0.59 mg/dL      Sodium 139 mmol/L      Potassium 4.0 mmol/L      Chloride 98 mmol/L      CO2 26.0 mmol/L      Calcium 9.4 mg/dL      Total Protein 7.8 g/dL      Albumin 3.90 g/dL      ALT (SGPT) 187 (H) U/L      AST (SGOT) 179 (H) U/L      Alkaline Phosphatase 946 (H) U/L      Total Bilirubin 0.6 mg/dL      eGFR Non African Amer 103 mL/min/1.73      Globulin 3.9 gm/dL      A/G Ratio 1.0 (L) g/dL      BUN/Creatinine Ratio 16.9     Anion Gap 15.0 (H) mmol/L     Blood Gas, Arterial [[id number]]  (Abnormal) Collected:  11/03/18 1950    Specimen:  Arterial Blood Updated:  11/03/18 2008     Site Right Brachial     Dimitri's Test N/A     pH, Arterial 7.474 (H) pH units      pCO2, Arterial 33.3 (L) mm Hg      pO2, Arterial 52.7 (C) mm Hg      HCO3, Arterial 24.5 mmol/L      Base Excess, Arterial 1.3 mmol/L      O2 Saturation, Arterial 90.5 (L) %      Temperature 37.0 C      Barometric Pressure for Blood Gas 754 mmHg      Modality Room Air     Ventilator Mode NA     Notified Who CHERYL OTERO     Notified By [id number]     Notified Time 11/03/2018 20:08     Collected by [id number]    D-dimer, Quantitative [[id number]]  (Abnormal) Collected:   11/03/18 1941    Specimen:  Blood Updated:  11/03/18 2006     D-Dimer, Quantitative 2.14 (H) mg/L (FEU)     Narrative:       Reference Range is 0-0.50 mg/L FEU. However, results <0.50 mg/L FEU tends to rule out DVT or PE. Results >0.50 mg/L FEU are not useful in predicting absence or presence of DVT or PE.    Lactic Acid, Plasma [[id number]]  (Normal) Collected:  11/03/18 1941    Specimen:  Blood Updated:  11/03/18 2004     Lactate 1.5 mmol/L         Imaging Results (last 24 hours)     Procedure Component Value Units Date/Time    CT Angiogram Chest With Contrast [[id number]] Collected:  11/03/18 2053     Updated:  11/03/18 2100    Narrative:       CT ANGIOGRAM CHEST W CONTRAST- 11/3/2018 8:33 PM CDT      HISTORY: Shortness of breath      COMPARISON: None.      DOSE LENGTH PRODUCT: 259 mGy cm. Automated exposure control was also  utilized to decrease patient radiation dose.     TECHNIQUE: Helical tomographic images of the chest were obtained after  the administration of intravenous contrast following angiogram protocol.  Additionally, 3D and multiplanar reformatted images were provided.        FINDINGS:    Pulmonary arteries: There is adequate enhancement of the pulmonary  arteries to evaluate for central and segmental pulmonary emboli. There  are no filling defects within the main, lobar, segmental or visualized  subsegmental pulmonary arteries. The pulmonary arteries are within  normal limits for size.      Aorta and great vessels: The aorta is well opacified and demonstrates no  dissection or aneurysm. The great vessels are normal in appearance.     Visualized neck base: The imaged portion of the base of the neck appears  unremarkable.      Lungs: Diffuse centrilobular nodules are seen in bilateral lungs. There  is also consolidation in the lingula. The trachea and bronchial tree are  patent.      Heart: The heart is normal in size. There is no pericardial effusion.      Mediastinum and lymph nodes: Mediastinal and  bilateral hilar  lymphadenopathy is present.      Skeletal and soft tissues: Degenerative changes are seen in the lower  thoracic spine.     Upper abdomen: The imaged portion of the upper abdomen demonstrates no  acute process.        Impression:       1. Diffuse nodular opacities in bilateral lungs are likely due to  disseminated granulomatous infection. Miliary TB should be considered.  Hematogenous spread of malignancy is another consideration.  2. Lingular consolidation may represent focal pneumonia or atelectasis.  3. Mediastinal and hilar lymphadenopathy could be neoplastic or  reactive.        This report was finalized on 11/03/2018 20:57 by Dr. Vidal Maher MD.    XR Chest 2 View [[id number]] Collected:  11/03/18 1951     Updated:  11/1955    Narrative:       XR CHEST 2 VW- 11/3/2018 7:43 PM CDT     HISTORY: SOA  triage protocol      COMPARISON: 10/20/2017     FINDINGS:   Upright frontal and lateral radiographs of the chest were obtained.     There is consolidation in the lingula. Diffuse interstitial opacities  are also present. The cardiomediastinal silhouette and pulmonary  vascularity are unchanged. The osseous structures and surrounding soft  tissues demonstrate no acute abnormality.       Impression:       1. LEFT lingular consolidation most likely represents pneumonia.  2. Interstitial opacities in bilateral lungs could be due to  interstitial pneumonia or edema.  This report was finalized on 11/03/2018 19:52 by Dr. Vidal Maher MD.          Assessment/Plan         Active Hospital Problems    Diagnosis   • Sepsis (CMS/[facility])       Assessment / Plan:  Acute hypoxic respiratory failure - I believe this is secondary to acute on chronic COPD with exacerbation, sepsis with pneumonia, and acute pulmonary edema.  Please see below as we will address each of these etiologies.  The patient has a respiratory rate of near 30.  We will maintain on the BiPAP machine and repeat a blood gas as necessary.  Also  the patient has what appears to be some pulmonary congestion, will give IV Lasix and continue to monitor.    sepsis secondary to pneumonia - will trend lactic acid, will check blood cultures, will check respiratory cultures and urine for Legionella and strep.  We'll add breathing treatments with inhaled steroids and DuoNeb's.  IV antibiotics with vancomycin and cefepime.    Mild hypotension - we will start IV fluid resuscitation.  We'll place in the CCU for further monitoring.  If the patient continues to have hypotension and sustained a blood pressure with map below 65, will start pressors.    Acute transaminitis with elevated alkaline phosphatase - will check hepatitis profile, and a liver ultrasound.    Diffuse nodular opacities in bilateral lungs - on CTA- there is question of malignancy.  We'll consult pulmonology, and order a quanterferon Gold to rule out tuberculosis.  Also I spoke to pharmacy for prophylactic antibiotic choice to cover for tuberculosis.  If positive will consult infectious disease.    Hyperglycemia without a known diagnosis of diabetes - check hemoglobin A1c.    Elevated d-dimer - a CTA without pulmonary embolus.  We will order a venous ultrasound to rule out DVT.    Ongoing tobacco abuse - continue to  and offer cessation for discharge.    Acute on chronic COPD with exacerbation - inhale steroids, IV steroids, albuterol/ipratropium.    Ethics- full code  DVT Prophylaxis    EMR Dragon/Transcription disclaimer:   Much of this encounter note is an electronic transcription/translation of spoken language to printed text. The electronic translation of spoken language may permit erroneous, or at times, nonsensical words or phrases to be inadvertently transcribed; Although I have reviewed the note for such errors, some may still exist.            This document has been electronically signed by Rusty Abdul DO on November 3, 2018 10:47 PM

## 2018-11-04 NOTE — PROGRESS NOTES
"Pharmacy Dosing Service  Pharmacokinetics  Vancomycin Follow-up Evaluation    Assessment/Action/Plan:  Vancomycin initiate las PM for sepsis/ pneumonia. SCr=0.47 (see below). Adjusted dose to 1000mg IV Q24h. Pharmacy will continue to monitor renal function and adjust dose accordingly.     Subjective:  Tea Sams is a 63 y.o. female currently on Vancomycin for the treatment of sepsis/ pneumonia, day 2 of therapy.    Objective:  Ht: 165.1 cm (65\"); Wt: 44 kg (97 lb)  Estimated Creatinine Clearance: 85.1 mL/min (A) (by C-G formula based on SCr of 0.47 mg/dL (L)).   Lab Results   Component Value Date    CREATININE 0.47 (L) 11/04/2018    CREATININE 0.59 11/03/2018    CREATININE 0.80 10/20/2017      Lab Results   Component Value Date    WBC 15.65 (H) 11/04/2018    WBC 19.33 (H) 11/03/2018    WBC 15.49 (H) 10/20/2017         Culture Results:  Microbiology Results (last 10 days)       Procedure Component Value - Date/Time    S. Pneumo Ag Urine or CSF - Urine, Urine, Clean Catch [[id number]]  (Normal) Collected:  11/04/18 0225    Lab Status:  Final result Specimen:  Urine from Urine, Clean Catch Updated:  11/04/18 0345     Strep Pneumo Ag Negative    Legionella Antigen, Urine - Urine, Urine, Clean Catch [[id number]]  (Normal) Collected:  11/04/18 0225    Lab Status:  Final result Specimen:  Urine from Urine, Clean Catch Updated:  11/04/18 0346     LEGIONELLA ANTIGEN, URINE Negative    Respiratory Culture - Sputum, Cough [[id number]] Collected:  11/04/18 0140    Lab Status:  Preliminary result Specimen:  Sputum from Cough Updated:  11/04/18 1012     Gram Stain Result Greater than 25 WBCs seen      Moderate (3+) Mixed gram positive dong    Blood Culture - Blood, [[id number]]  (Normal) Collected:  11/03/18 2124    Lab Status:  Preliminary result Specimen:  Blood from Arm, Left Updated:  11/04/18 0845     Blood Culture No growth at less than 24 hours    Blood Culture - Blood, [[id number]]  (Normal) Collected:  11/03/18 2124    " Lab Status:  Preliminary result Specimen:  Blood from Arm, Left Updated:  11/04/18 0845     Blood Culture No growth at less than 24 hours    Influenza Antigen, Rapid - Swab, Nasopharynx [[id number]]  (Normal) Collected:  11/03/18 1958    Lab Status:  Final result Specimen:  Swab from Nasopharynx Updated:  11/03/18 2015     Influenza A Ag, EIA Negative     Influenza B Ag, EIA Negative    Narrative:         Recommend confirmation of negative results by viral culture or molecular assay.          Salty, Pharm D  11:36 AM  11/4/2018

## 2018-11-04 NOTE — ED PROVIDER NOTES
Subjective     Shortness of Breath   Associated symptoms: cough and fever    Associated symptoms: no abdominal pain, no chest pain and no vomiting      Patient is a pleasant 63  female with chief complaint of shortness of breath.  The patient has a history of COPD but does not wear oxygen at home.  She does have an inhaler due to her previous COPD exacerbation and pneumonia about 6 weeks ago.  She was hospitalized at [facility] for that.  She reports that she was home for a few days but then resumed work, working 6-7 days per week at [employer].  For the past several days, her symptoms felt like it  Has been recurring.  She complains of shortness breath with minimal exertion such as walking to the bathroom.  The patient can lay supine without any difficulty.  She is complaining of subjective fever with chills.  She does complain of body aches.  She complains of white phlegm with her cough.  She denies any hemoptysis.  She denies leg pain or swelling.  She denies history of DVT or PE.  EMS was notified and the patient was transferred here.  She was reportedly tachycardic (about 110) prior to receiving her DuoNeb breathing treatment.  Her O2 sats ranged from 90-92% with good waveform on room air.    Patient denies being on antibiotics recently.  She denies any cardiac abnormality.  She has not been on antibiotics in the past few days.    Review of Systems   Constitutional: Positive for activity change, appetite change, chills, fatigue and fever.   HENT: Positive for congestion.    Respiratory: Positive for cough and shortness of breath. Negative for choking and chest tightness.    Cardiovascular: Negative.  Negative for chest pain and leg swelling.   Gastrointestinal: Negative.  Negative for abdominal pain, constipation, diarrhea, nausea and vomiting.   Genitourinary: Negative.    Musculoskeletal: Negative.    Skin: Negative.    Neurological: Negative.    Psychiatric/Behavioral: Negative.        Past Medical History:  "  Diagnosis Date   • Arthritis    • COPD (chronic obstructive pulmonary disease) (CMS/HCC)        No Known Allergies    Past Surgical History:   Procedure Laterality Date   •  SECTION     • ENDOSCOPY N/A 10/20/2017    Procedure: ESOPHAGOGASTRODUODENOSCOPY WITH ANESTHESIA;  Surgeon: Femi Goddard MD;  Location: [facility] OR;  Service:    • HERNIA REPAIR     • TONSILLECTOMY         Family History   Problem Relation Age of Onset   • Cancer Mother    • Cancer Father        Social History     Social History   • Marital status: Single     Social History Main Topics   • Smoking status: Current Every Day Smoker     Packs/day: 1.00     Years: 40.00     Types: Cigarettes   • Alcohol use No   • Drug use: No     Other Topics Concern   • Not on file       Prior to Admission medications    Medication Sig Start Date End Date Taking? Authorizing Provider   naproxen (NAPROSYN) 500 MG tablet Take 1 tablet by mouth Every 12 (Twelve) Hours As Needed for Moderate Pain . 18   Jenny Ojeda APRN       Medications   sodium chloride 0.9 % flush 10 mL (not administered)   sodium chloride 0.9 % bolus 1,428 mL (1,428 mL Intravenous New Bag 11/3/18 2017)   sodium chloride 0.9 % infusion (not administered)   albuterol (PROVENTIL) nebulizer solution 0.083% 2.5 mg/3mL (2.5 mg Nebulization Given 11/3/18 2005)   vancomycin 1 g/250 mL 0.9% NS (vial-mate) (1,000 mg Intravenous New Bag 11/3/18 2104)   piperacillin-tazobactam (ZOSYN) 3.375 g/100 mL 0.9% NS IVPB (mbp) (3.375 g Intravenous New Bag 11/3/18 2018)   iopamidol (ISOVUE-370) 76 % injection 100 mL (100 mL Intravenous Given 11/3/18 2044)       /74   Pulse 119   Temp 99.3 °F (37.4 °C) (Oral)   Resp 22   Ht 165.1 cm (65\")   Wt 47.6 kg (105 lb)   SpO2 95%   BMI 17.47 kg/m²       Objective   Physical Exam   Constitutional: She is oriented to person, place, and time. She appears well-developed and well-nourished. She appears distressed.   HENT:   Head: Normocephalic " and atraumatic.   Eyes: Pupils are equal, round, and reactive to light. Conjunctivae and EOM are normal.   Neck: Normal range of motion. Neck supple. No tracheal deviation present.   Cardiovascular: Normal rate, regular rhythm, normal heart sounds and intact distal pulses.    No murmur heard.  Pulmonary/Chest: Effort normal. Tachypnea noted. She has wheezes in the right upper field, the right middle field, the right lower field, the left upper field, the left middle field and the left lower field.   Abdominal: Soft. Bowel sounds are normal. She exhibits no distension and no mass. There is no tenderness. There is no rebound and no guarding.   Musculoskeletal: Normal range of motion. She exhibits no edema or tenderness.   Negative homans sign   Neurological: She is alert and oriented to person, place, and time. She has normal reflexes. Coordination normal.   Skin: Skin is warm and dry. Capillary refill takes less than 2 seconds. She is not diaphoretic.   Psychiatric: She has a normal mood and affect. Her behavior is normal. Judgment and thought content normal.   Nursing note and vitals reviewed.      Procedures         Lab Results (last 24 hours)     Procedure Component Value Units Date/Time    CBC & Differential [[id number]] Collected:  11/03/18 1941    Specimen:  Blood Updated:  11/03/18 2019    Narrative:       The following orders were created for panel order CBC & Differential.  Procedure                               Abnormality         Status                     ---------                               -----------         ------                     Manual Differential[[id number]]          Abnormal            Final result               CBC Auto Differential[[id number]]        Abnormal            Final result                 Please view results for these tests on the individual orders.    Comprehensive Metabolic Panel [[id number]]  (Abnormal) Collected:  11/03/18 1941    Specimen:  Blood Updated:  11/03/18 2008      Glucose 144 (H) mg/dL      BUN 10 mg/dL      Creatinine 0.59 mg/dL      Sodium 139 mmol/L      Potassium 4.0 mmol/L      Chloride 98 mmol/L      CO2 26.0 mmol/L      Calcium 9.4 mg/dL      Total Protein 7.8 g/dL      Albumin 3.90 g/dL      ALT (SGPT) 187 (H) U/L      AST (SGOT) 179 (H) U/L      Alkaline Phosphatase 946 (H) U/L      Total Bilirubin 0.6 mg/dL      eGFR Non African Amer 103 mL/min/1.73      Globulin 3.9 gm/dL      A/G Ratio 1.0 (L) g/dL      BUN/Creatinine Ratio 16.9     Anion Gap 15.0 (H) mmol/L     BNP [[id number]]  (Normal) Collected:  11/03/18 1941    Specimen:  Blood Updated:  11/03/18 2016     proBNP 137.0 pg/mL     Troponin [[id number]]  (Normal) Collected:  11/03/18 1941    Specimen:  Blood Updated:  11/03/18 2016     Troponin I <0.012 ng/mL     CBC Auto Differential [[id number]]  (Abnormal) Collected:  11/03/18 1941    Specimen:  Blood Updated:  11/03/18 2019     WBC 19.33 (H) 10*3/mm3      RBC 4.10 (L) 10*6/mm3      Hemoglobin 12.7 g/dL      Hematocrit 37.8 %      MCV 92.2 fL      MCH 31.0 pg      MCHC 33.6 g/dL      RDW 14.2 %      RDW-SD 48.2 fl      MPV 9.1 fL      Platelets 551 (H) 10*3/mm3     Narrative:       The previously reported component NRBC is no longer being reported.    Lactic Acid, Plasma [[id number]]  (Normal) Collected:  11/03/18 1941    Specimen:  Blood Updated:  11/03/18 2004     Lactate 1.5 mmol/L     D-dimer, Quantitative [[id number]]  (Abnormal) Collected:  11/03/18 1941    Specimen:  Blood Updated:  11/03/18 2006     D-Dimer, Quantitative 2.14 (H) mg/L (FEU)     Narrative:       Reference Range is 0-0.50 mg/L FEU. However, results <0.50 mg/L FEU tends to rule out DVT or PE. Results >0.50 mg/L FEU are not useful in predicting absence or presence of DVT or PE.    C-reactive Protein [[id number]]  (Abnormal) Collected:  11/03/18 1941    Specimen:  Blood Updated:  11/03/18 2024     C-Reactive Protein 24.90 (H) mg/dL     Procalcitonin [[id number]]  (Abnormal) Collected:  11/03/18  1941    Specimen:  Blood Updated:  11/03/18 2033     Procalcitonin 0.44 (H) ng/mL     Narrative:       SIRS, sepsis, severe sepsis, and septic shock are categorized according to the criteria of the consensus conference of the American College of Chest Physicians/Society of Critical Care Medicine.    PCT < 0.5 ng/mL     Systemic infection (sepsis) is not likely.    PCT >0.5 and < 2.0 ng/mL Systemic infection (sepsis) is possible, but other conditions are known to elevate PCT as well.    PCT > 2.0 ng/mL     Systemic infection (sepsis) is likely, unless other causes are known.      PCT > 10.0 ng/mL    Important systemic inflammatory response, almost exclusively due to severe bacterial sepsis or septic shock.    PCT values of < 0.5 ng/mL do not exclude an infection, because localized infections (without systemic signs) may be associated with such low concentrations, or a systemic infection in its initial stages (<6 hours).  Increased PCT can occur without infection.  PCT concentrations between 0.5 and 2.0 ng/mL should be interpreted taking into account the patients history.  It is recommended to retest PCT within 6-24 hours if any concentrations < 2.0 ng/mL are obtained.    Manual Differential [[id number]]  (Abnormal) Collected:  11/03/18 1941    Specimen:  Blood Updated:  11/03/18 2019     Neutrophil % 66.3 %      Lymphocyte % 11.9 (L) %      Monocyte % 13.9 (H) %      Eosinophil % 3.0 %      Basophil % 1.0 %      Bands %  2.0 %      Myelocyte % 1.0 (H) %      Atypical Lymphocyte % 1.0 %      Neutrophils Absolute 13.21 (H) 10*3/mm3      Lymphocytes Absolute 2.30 10*3/mm3      Monocytes Absolute 2.69 (H) 10*3/mm3      Eosinophils Absolute 0.58 10*3/mm3      Basophils Absolute 0.19 10*3/mm3      Polychromasia Slight/1+     Target Cells Slight/1+     Toxic Granulation Mod/2+     Vacuolated Neutrophils Mod/2+     Platelet Estimate Increased    Blood Gas, Arterial [[id number]]  (Abnormal) Collected:  11/03/18 1950     Specimen:  Arterial Blood Updated:  11/03/18 2008     Site Right Brachial     Dimitri's Test N/A     pH, Arterial 7.474 (H) pH units      pCO2, Arterial 33.3 (L) mm Hg      pO2, Arterial 52.7 (C) mm Hg      HCO3, Arterial 24.5 mmol/L      Base Excess, Arterial 1.3 mmol/L      O2 Saturation, Arterial 90.5 (L) %      Temperature 37.0 C      Barometric Pressure for Blood Gas 754 mmHg      Modality Room Air     Ventilator Mode NA     Notified [facility] CHERYL OTERO     Notified By [id number]     Notified Time 11/03/2018 20:08     Collected by [id number]    Influenza Antigen, Rapid - Swab, Nasopharynx [[id number]]  (Normal) Collected:  11/03/18 1958    Specimen:  Swab from Nasopharynx Updated:  11/03/18 2015     Influenza A Ag, EIA Negative     Influenza B Ag, EIA Negative    Narrative:         Recommend confirmation of negative results by viral culture or molecular assay.          Xr Chest 2 View    Result Date: 11/3/2018  Narrative: XR CHEST 2 VW- 11/3/2018 7:43 PM CDT  HISTORY: SOA  triage protocol  COMPARISON: 10/20/2017  FINDINGS: Upright frontal and lateral radiographs of the chest were obtained.  There is consolidation in the lingula. Diffuse interstitial opacities are also present. The cardiomediastinal silhouette and pulmonary vascularity are unchanged. The osseous structures and surrounding soft tissues demonstrate no acute abnormality.      Impression: 1. LEFT lingular consolidation most likely represents pneumonia. 2. Interstitial opacities in bilateral lungs could be due to interstitial pneumonia or edema. This report was finalized on 11/03/2018 19:52 by Dr. Vidal Maher MD.    Ct Angiogram Chest With Contrast    Result Date: 11/3/2018  Narrative: CT ANGIOGRAM CHEST W CONTRAST- 11/3/2018 8:33 PM CDT  HISTORY: Shortness of breath  COMPARISON: None.  DOSE LENGTH PRODUCT: 259 mGy cm. Automated exposure control was also utilized to decrease patient radiation dose.  TECHNIQUE: Helical tomographic images of the chest were  obtained after the administration of intravenous contrast following angiogram protocol. Additionally, 3D and multiplanar reformatted images were provided.    FINDINGS:  Pulmonary arteries: There is adequate enhancement of the pulmonary arteries to evaluate for central and segmental pulmonary emboli. There are no filling defects within the main, lobar, segmental or visualized subsegmental pulmonary arteries. The pulmonary arteries are within normal limits for size.  Aorta and great vessels: The aorta is well opacified and demonstrates no dissection or aneurysm. The great vessels are normal in appearance.  Visualized neck base: The imaged portion of the base of the neck appears unremarkable.  Lungs: Diffuse centrilobular nodules are seen in bilateral lungs. There is also consolidation in the lingula. The trachea and bronchial tree are patent.  Heart: The heart is normal in size. There is no pericardial effusion.  Mediastinum and lymph nodes: Mediastinal and bilateral hilar lymphadenopathy is present.  Skeletal and soft tissues: Degenerative changes are seen in the lower thoracic spine.  Upper abdomen: The imaged portion of the upper abdomen demonstrates no acute process.      Impression: 1. Diffuse nodular opacities in bilateral lungs are likely due to disseminated granulomatous infection. Miliary TB should be considered. Hematogenous spread of malignancy is another consideration. 2. Lingular consolidation may represent focal pneumonia or atelectasis. 3. Mediastinal and hilar lymphadenopathy could be neoplastic or reactive.   This report was finalized on 11/03/2018 20:57 by Dr. Vidal Maher MD.      ED Course          I have spoken to the patient at length about her test results and reviewed the previous records from [facility] 6 weeks ago. She was started on Vancomycin and Zosyn given her demonstrate of possible sepsis secondary to pneumonia. Sepsis protocol labs and fluids were intiated.   The patient reports that  she was in prison a year and half ago.  She denies any IV drug use or previous history of tuberculosis.  She denies any known cancer.  I spoken with Dr. Abdul, hospitalist on-call, who will admit the patient under his services.    MDM  Number of Diagnoses or Management Options  Diagnosis management comments: Shortness of breath differential includes but not limited to:  Pneumonia, COPD, CHF, bronchitis, pericardial effusion with or without tamponade, pulmonary embolism, myocardial infarction, pulmonary edema, pulmonary effusion, asthma, allergic reaction, anxiety reaction, etc...         Amount and/or Complexity of Data Reviewed  Decide to obtain previous medical records or to obtain history from someone other than the patient: yes        Final diagnoses:   Abnormal CT of the chest   Elevated LFTs   Acute respiratory failure with hypoxia (CMS/HCC)   Sepsis, due to unspecified organism (CMS/HCC)   Tuberculosis high risk   COPD exacerbation (CMS/HCC)   Thrombocytosis (CMS/HCC)          Ebony Chen PA  11/03/18 3687

## 2018-11-05 ENCOUNTER — APPOINTMENT (OUTPATIENT)
Dept: ULTRASOUND IMAGING | Facility: HOSPITAL | Age: 63
End: 2018-11-05

## 2018-11-05 LAB
ALBUMIN SERPL-MCNC: 3.4 G/DL (ref 3.5–5)
ALBUMIN/GLOB SERPL: 1 G/DL (ref 1.1–2.5)
ALP SERPL-CCNC: 593 U/L (ref 24–120)
ALT SERPL W P-5'-P-CCNC: 109 U/L (ref 0–54)
ANION GAP SERPL CALCULATED.3IONS-SCNC: 10 MMOL/L (ref 4–13)
ARTICHOKE IGE QN: 96 MG/DL (ref 0–99)
AST SERPL-CCNC: 63 U/L (ref 7–45)
BASOPHILS # BLD AUTO: 0.08 10*3/MM3 (ref 0–0.2)
BASOPHILS NFR BLD AUTO: 0.3 % (ref 0–2)
BILIRUB SERPL-MCNC: 0.2 MG/DL (ref 0.1–1)
BUN BLD-MCNC: 14 MG/DL (ref 5–21)
BUN/CREAT SERPL: 24.1 (ref 7–25)
CALCIUM SPEC-SCNC: 9.1 MG/DL (ref 8.4–10.4)
CHLORIDE SERPL-SCNC: 103 MMOL/L (ref 98–110)
CHOLEST SERPL-MCNC: 138 MG/DL (ref 130–200)
CO2 SERPL-SCNC: 30 MMOL/L (ref 24–31)
CREAT BLD-MCNC: 0.58 MG/DL (ref 0.5–1.4)
DEPRECATED RDW RBC AUTO: 50.9 FL (ref 40–54)
EOSINOPHIL # BLD AUTO: 0 10*3/MM3 (ref 0–0.7)
EOSINOPHIL NFR BLD AUTO: 0 % (ref 0–4)
ERYTHROCYTE [DISTWIDTH] IN BLOOD BY AUTOMATED COUNT: 14.6 % (ref 12–15)
GFR SERPL CREATININE-BSD FRML MDRD: 105 ML/MIN/1.73
GLOBULIN UR ELPH-MCNC: 3.4 GM/DL
GLUCOSE BLD-MCNC: 197 MG/DL (ref 70–100)
HCT VFR BLD AUTO: 32.9 % (ref 37–47)
HDLC SERPL-MCNC: 27 MG/DL
HGB BLD-MCNC: 11 G/DL (ref 12–16)
IMM GRANULOCYTES # BLD: 0.45 10*3/MM3 (ref 0–0.03)
IMM GRANULOCYTES NFR BLD: 1.9 % (ref 0–5)
LDLC/HDLC SERPL: 3.39 {RATIO}
LYMPHOCYTES # BLD AUTO: 1.74 10*3/MM3 (ref 0.72–4.86)
LYMPHOCYTES NFR BLD AUTO: 7.5 % (ref 15–45)
MCH RBC QN AUTO: 31.4 PG (ref 28–32)
MCHC RBC AUTO-ENTMCNC: 33.4 G/DL (ref 33–36)
MCV RBC AUTO: 94 FL (ref 82–98)
MONOCYTES # BLD AUTO: 1.13 10*3/MM3 (ref 0.19–1.3)
MONOCYTES NFR BLD AUTO: 4.9 % (ref 4–12)
NEUTROPHILS # BLD AUTO: 19.7 10*3/MM3 (ref 1.87–8.4)
NEUTROPHILS NFR BLD AUTO: 85.4 % (ref 39–78)
NRBC BLD MANUAL-RTO: 0 /100 WBC (ref 0–0)
PLATELET # BLD AUTO: 559 10*3/MM3 (ref 130–400)
PMV BLD AUTO: 9.3 FL (ref 6–12)
POTASSIUM BLD-SCNC: 4.3 MMOL/L (ref 3.5–5.3)
PROT SERPL-MCNC: 6.8 G/DL (ref 6.3–8.7)
RBC # BLD AUTO: 3.5 10*6/MM3 (ref 4.2–5.4)
SODIUM BLD-SCNC: 143 MMOL/L (ref 135–145)
TRIGL SERPL-MCNC: 97 MG/DL (ref 0–149)
TSH SERPL DL<=0.05 MIU/L-ACNC: 1.55 MIU/ML (ref 0.47–4.68)
VANCOMYCIN TROUGH SERPL-MCNC: <5 MCG/ML (ref 10–20)
WBC NRBC COR # BLD: 23.1 10*3/MM3 (ref 4.8–10.8)

## 2018-11-05 PROCEDURE — 86481 TB AG RESPONSE T-CELL SUSP: CPT | Performed by: INTERNAL MEDICINE

## 2018-11-05 PROCEDURE — 85025 COMPLETE CBC W/AUTO DIFF WBC: CPT | Performed by: FAMILY MEDICINE

## 2018-11-05 PROCEDURE — 94799 UNLISTED PULMONARY SVC/PX: CPT

## 2018-11-05 PROCEDURE — 25010000002 AZITHROMYCIN PER 500 MG: Performed by: INTERNAL MEDICINE

## 2018-11-05 PROCEDURE — 94760 N-INVAS EAR/PLS OXIMETRY 1: CPT

## 2018-11-05 PROCEDURE — 63710000001 PREDNISONE PER 1 MG: Performed by: INTERNAL MEDICINE

## 2018-11-05 PROCEDURE — 84443 ASSAY THYROID STIM HORMONE: CPT | Performed by: FAMILY MEDICINE

## 2018-11-05 PROCEDURE — 80061 LIPID PANEL: CPT | Performed by: FAMILY MEDICINE

## 2018-11-05 PROCEDURE — 25010000002 ENOXAPARIN PER 10 MG: Performed by: INTERNAL MEDICINE

## 2018-11-05 PROCEDURE — 99406 BEHAV CHNG SMOKING 3-10 MIN: CPT

## 2018-11-05 PROCEDURE — 25010000002 METHYLPREDNISOLONE PER 40 MG: Performed by: FAMILY MEDICINE

## 2018-11-05 PROCEDURE — 25010000002 KETOROLAC TROMETHAMINE PER 15 MG: Performed by: INTERNAL MEDICINE

## 2018-11-05 PROCEDURE — 99232 SBSQ HOSP IP/OBS MODERATE 35: CPT | Performed by: INTERNAL MEDICINE

## 2018-11-05 PROCEDURE — 25010000002 CEFEPIME PER 500 MG: Performed by: FAMILY MEDICINE

## 2018-11-05 PROCEDURE — 87385 HISTOPLASMA CAPSUL AG IA: CPT | Performed by: INTERNAL MEDICINE

## 2018-11-05 PROCEDURE — 25010000002 VANCOMYCIN 10 G RECONSTITUTED SOLUTION: Performed by: INTERNAL MEDICINE

## 2018-11-05 PROCEDURE — 76705 ECHO EXAM OF ABDOMEN: CPT

## 2018-11-05 PROCEDURE — 80053 COMPREHEN METABOLIC PANEL: CPT | Performed by: FAMILY MEDICINE

## 2018-11-05 PROCEDURE — 80202 ASSAY OF VANCOMYCIN: CPT | Performed by: FAMILY MEDICINE

## 2018-11-05 PROCEDURE — 25010000002 HEPARIN (PORCINE) PER 1000 UNITS: Performed by: FAMILY MEDICINE

## 2018-11-05 RX ORDER — KETOROLAC TROMETHAMINE 30 MG/ML
30 INJECTION, SOLUTION INTRAMUSCULAR; INTRAVENOUS EVERY 6 HOURS PRN
Status: DISCONTINUED | OUTPATIENT
Start: 2018-11-05 | End: 2018-11-05

## 2018-11-05 RX ORDER — ACETAMINOPHEN 325 MG/1
650 TABLET ORAL EVERY 6 HOURS PRN
Status: DISCONTINUED | OUTPATIENT
Start: 2018-11-05 | End: 2018-11-08 | Stop reason: HOSPADM

## 2018-11-05 RX ORDER — IPRATROPIUM BROMIDE AND ALBUTEROL SULFATE 2.5; .5 MG/3ML; MG/3ML
3 SOLUTION RESPIRATORY (INHALATION)
Status: DISCONTINUED | OUTPATIENT
Start: 2018-11-05 | End: 2018-11-08 | Stop reason: HOSPADM

## 2018-11-05 RX ORDER — PREDNISONE 20 MG/1
40 TABLET ORAL
Status: DISCONTINUED | OUTPATIENT
Start: 2018-11-05 | End: 2018-11-08 | Stop reason: HOSPADM

## 2018-11-05 RX ORDER — KETOROLAC TROMETHAMINE 30 MG/ML
30 INJECTION, SOLUTION INTRAMUSCULAR; INTRAVENOUS ONCE
Status: COMPLETED | OUTPATIENT
Start: 2018-11-05 | End: 2018-11-05

## 2018-11-05 RX ADMIN — METHYLPREDNISOLONE SODIUM SUCCINATE 40 MG: 40 INJECTION, POWDER, LYOPHILIZED, FOR SOLUTION INTRAMUSCULAR; INTRAVENOUS at 06:15

## 2018-11-05 RX ADMIN — AZITHROMYCIN MONOHYDRATE 500 MG: 500 INJECTION, POWDER, LYOPHILIZED, FOR SOLUTION INTRAVENOUS at 09:08

## 2018-11-05 RX ADMIN — VANCOMYCIN HYDROCHLORIDE 500 MG: 10 INJECTION, POWDER, LYOPHILIZED, FOR SOLUTION INTRAVENOUS at 16:34

## 2018-11-05 RX ADMIN — IPRATROPIUM BROMIDE AND ALBUTEROL SULFATE 3 ML: 2.5; .5 SOLUTION RESPIRATORY (INHALATION) at 11:46

## 2018-11-05 RX ADMIN — Medication 3 ML: at 09:59

## 2018-11-05 RX ADMIN — POLYETHYLENE GLYCOL 3350 17 G: 17 POWDER, FOR SOLUTION ORAL at 09:09

## 2018-11-05 RX ADMIN — IPRATROPIUM BROMIDE AND ALBUTEROL SULFATE 3 ML: 2.5; .5 SOLUTION RESPIRATORY (INHALATION) at 07:15

## 2018-11-05 RX ADMIN — ACETAMINOPHEN 650 MG: 325 TABLET, FILM COATED ORAL at 13:56

## 2018-11-05 RX ADMIN — METHYLPREDNISOLONE SODIUM SUCCINATE 40 MG: 40 INJECTION, POWDER, LYOPHILIZED, FOR SOLUTION INTRAMUSCULAR; INTRAVENOUS at 00:32

## 2018-11-05 RX ADMIN — FAMOTIDINE 20 MG: 20 TABLET, FILM COATED ORAL at 20:32

## 2018-11-05 RX ADMIN — KETOROLAC TROMETHAMINE 30 MG: 30 INJECTION, SOLUTION INTRAMUSCULAR at 13:56

## 2018-11-05 RX ADMIN — HEPARIN SODIUM 5000 UNITS: 5000 INJECTION, SOLUTION INTRAVENOUS; SUBCUTANEOUS at 06:15

## 2018-11-05 RX ADMIN — PREDNISONE 40 MG: 20 TABLET ORAL at 09:08

## 2018-11-05 RX ADMIN — BUDESONIDE AND FORMOTEROL FUMARATE DIHYDRATE 2 PUFF: 160; 4.5 AEROSOL RESPIRATORY (INHALATION) at 19:02

## 2018-11-05 RX ADMIN — Medication 3 ML: at 20:32

## 2018-11-05 RX ADMIN — CEFEPIME HYDROCHLORIDE 2 G: 2 INJECTION, POWDER, FOR SOLUTION INTRAVENOUS at 10:02

## 2018-11-05 RX ADMIN — ENOXAPARIN SODIUM 40 MG: 40 INJECTION SUBCUTANEOUS at 09:08

## 2018-11-05 RX ADMIN — CEFEPIME HYDROCHLORIDE 2 G: 2 INJECTION, POWDER, FOR SOLUTION INTRAVENOUS at 18:04

## 2018-11-05 RX ADMIN — CEFEPIME HYDROCHLORIDE 2 G: 2 INJECTION, POWDER, FOR SOLUTION INTRAVENOUS at 00:32

## 2018-11-05 RX ADMIN — IPRATROPIUM BROMIDE AND ALBUTEROL SULFATE 3 ML: 2.5; .5 SOLUTION RESPIRATORY (INHALATION) at 23:10

## 2018-11-05 RX ADMIN — IPRATROPIUM BROMIDE AND ALBUTEROL SULFATE 3 ML: 2.5; .5 SOLUTION RESPIRATORY (INHALATION) at 19:02

## 2018-11-05 RX ADMIN — BUDESONIDE AND FORMOTEROL FUMARATE DIHYDRATE 2 PUFF: 160; 4.5 AEROSOL RESPIRATORY (INHALATION) at 07:15

## 2018-11-05 RX ADMIN — FAMOTIDINE 20 MG: 20 TABLET, FILM COATED ORAL at 09:08

## 2018-11-05 NOTE — PROGRESS NOTES
Continued Stay Note   Rima     Patient Name: Tea Sams  MRN: [id number]  Today's Date: 11/5/2018    Admit Date: 11/3/2018          Discharge Plan     Row Name 11/05/18 1419       Plan    Plan Home    Patient/Family in Agreement with Plan yes    Plan Comments Patient screened over weekend by weekend .  Patient was screened by [facility] for potential Medicaid eligibility.  Due to income, patient is not eligible for Medicaid.  Patient does not have a PCP, will provide information on [facility] to patient.  SW provided [facility] locations and contact information.  Patient states she sees a physician as needed due to expense.  Dietician expressed concern regarding patient being able to afford food.  SW spoke to patient and she stated she receives services and food from [employer] once per month.  Patient states this is meeting her needs at this time.              Discharge Codes    No documentation.           DENISA Sinha

## 2018-11-05 NOTE — CONSULTS
"INFECTIOUS DISEASES CONSULT NOTE    Patient:  Tea Sams 63 y.o. female  ROOM # C004/1  YOB: 1955  MRN: [id number]  CSN:  [id number]  Admit date: 11/3/2018   Admitting Physician: Matias Snell MD  Primary Care Physician: Provider, No Known  REFERRING PROVIDER: Rusty Abdul, *    Reason for Consultation: Recommendations for antibiotic treatment and further workup.    History of Present Illness/Chief Complaint: 63-year-old woman.  She indicates in early to mid September she was admitted to [facility].  She indicates she was diagnosed with \"double pneumonia.\".  She was hospitalized for a few days.  She was discharged.  She indicates she medially return to work.  She indicates over the next month she basically worked continuously with only 2 days off.  She thought she overdid it.  Sunday 1 week ago she went in to work.  She indicates she was sneezing, coughing and short of breath.  She had productive sputum that is primarily whitish.  No large amount of sputum production.  She had no hemoptysis.  She indicates she had to leave work midafternoon Sunday of last week.  She has not been able to work subsequently.  She indicates she gets dyspnea on exertion.  She has cough.  She has had poor appetite.  She has had weight loss.  She indicates a few chills and night sweats over the past week.  She does not have a thermometer at home and does not know to what degree she has had temperature elevation.  She returned to the hospital on Saturday.  She was admitted for further management.  She indicates she is feeling better than when she was admitted.  Infectious disease asked to evaluate and offer recommendations.    Current Scheduled Medications:     azithromycin 500 mg Intravenous Q24H   budesonide-formoterol 2 puff Inhalation BID - RT   cefepime 2 g Intravenous Q8H   enoxaparin 40 mg Subcutaneous Q24H   famotidine 20 mg Oral BID   ipratropium-albuterol 3 mL Nebulization Q4H - RT " "  polyethylene glycol 17 g Oral Daily   predniSONE 40 mg Oral Daily With Breakfast   sodium chloride 3 mL Intravenous Q12H   vancomycin 500 mg Intravenous Q12H     Current PRN Medications:  •  acetaminophen  •  influenza vaccine  •  Pharmacy Consult - Pharmacy to dose  •  pneumococcal polysaccharide 23-valent  •  sodium chloride  •  sodium chloride    Allergies:  No Known Allergies     Past Medical History: Chronic obstructive pulmonary disease.  Arthritis.    Past Surgical History:  section.  Hernia repair.  Tonsillectomy.    Social History: Smokes 1 pack per day of tobacco up until recently.  She is trying to cut back.  No alcohol or illicit drug use.  No injection drug use.  She is single.  She works at [employer].  She indicates she works another job as well.  She indicates she lives in a hotel.  She has no pets.  No recent travel.    Family History: Mother and father had a history of cancer.    Exposure History: She knows of no family members or exposures to anyone with tuberculosis.    Review of Systems   She reports no skin lesions that will not heal  No headache or visual changes  No urinary tract complaints  No anginal type chest pain  She is not noticed any adenopathy  No bone or joint complaints  Please see HPI for remaining pertinent positive negatives per complete review of systems    Vital Signs:  /70 (BP Location: Right arm, Patient Position: Sitting)   Pulse 94   Temp 96.6 °F (35.9 °C) (Oral)   Resp 20   Ht 165.1 cm (65\")   Wt 44 kg (97 lb)   LMP  (Within Years)   SpO2 100%   BMI 16.14 kg/m²  Temp (24hrs), Av.7 °F (35.9 °C), Min:96.5 °F (35.8 °C), Max:97.2 °F (36.2 °C)    Physical Exam  Vital signs reviewed  Thin chronically ill-appearing  Sclera nonicteric  No supraclavicular cervical axillary or inguinal adenopathy  Lungs with prolonged expiratory phase.  Few scattered crackles  No wheezing.  Heart no apparent murmur  Abdomen thin.  Soft.  Nontender.  No " hepatosplenomegaly.  Extremities no edema  Neurological examination nonfocal  Skin without rash or lesion  Line/IV site: No erythema or tenderness.    Lab Results:  CBC:   Results from last 7 days  Lab Units 11/05/18  0548 11/04/18 0311 11/03/18 1941   WBC 10*3/mm3 23.10* 15.65* 19.33*   HEMOGLOBIN g/dL 11.0* 10.9* 12.7   HEMATOCRIT % 32.9* 31.7* 37.8   PLATELETS 10*3/mm3 559* 493* 551*   LYMPHOCYTE % %  --  2.0* 11.9*   MONOCYTES % %  --  3.0* 13.9*     CMP:   Results from last 7 days  Lab Units 11/05/18  0548 11/04/18 0311 11/03/18 1941   SODIUM mmol/L 143 141 139   POTASSIUM mmol/L 4.3 3.5 4.0   CHLORIDE mmol/L 103 105 98   CO2 mmol/L 30.0 22.0* 26.0   BUN mg/dL 14 7 10   CREATININE mg/dL 0.58 0.47* 0.59   CALCIUM mg/dL 9.1 7.6* 9.4   BILIRUBIN mg/dL 0.2  --  0.6   ALK PHOS U/L 593*  --  946*   ALT (SGPT) U/L 109*  --  187*   AST (SGOT) U/L 63*  --  179*   GLUCOSE mg/dL 197* 135* 144*     Radiology:   IMPRESSION:  1. Diffuse nodular opacities in bilateral lungs are likely due to  disseminated granulomatous infection. Miliary TB should be considered.  Hematogenous spread of malignancy is another consideration.  2. Lingular consolidation may represent focal pneumonia or atelectasis.  3. Mediastinal and hilar lymphadenopathy could be neoplastic or  reactive.  This report was finalized on 11/03/2018 20:57 by Dr. Vidal Maher MD.    Liver ultrasound:  IMPRESSION:  Limited evaluation of the gallbladder as it is contracted.  Grossly, no acute abnormalities.  This report was finalized on 11/05/2018 11:07 by Dr. Maida Murrieta MD.    Chest x-ray:  IMPRESSION:  1. LEFT lingular consolidation most likely represents pneumonia.  2. Interstitial opacities in bilateral lungs could be due to  interstitial pneumonia or edema.  This report was finalized on 11/03/2018 19:52 by Dr. Vidal Maher MD.    Additional Studies Reviewed:   2D echocardiogram:  · Left ventricular systolic function is normal.  LIMITED VIEWS BUT  OTHERWISE NORMAL STUDY    Impression:   1.  Diffuse nodular lung opacities, lingular consolidation, mediastinal and hilar adenopathy-large differential diagnosis.  Tuberculosis, blastomycosis, histoplasmosis, cryptococcal disease and cancer in the differential diagnosis.  2.  Elevated alkaline phosphatase, AST and ALT  3.  Leukocytosis    Recommendations:    We will continue to review history of the patient to kirby additional details  Serum QuantiFERON gold  Continue respiratory isolation  HIV serology  Urine histoplasma Blastomyces antigen  Serum cryptococcal antigen  Review CT with radiology   Continue to follow      De Altman MD  11/05/18  5:17 PM

## 2018-11-05 NOTE — PROGRESS NOTES
"Pharmacy Dosing Service  Pharmacokinetics  Vancomycin Follow-up Evaluation    Assessment/Action/Plan:  Vancomycin dose adjusted to 500mg iv q12h.  Vancomycin trough ordered before dose due on 11/6 at 1345.  Pharmacy will continue to monitor renal function and adjust dose accordingly.     Subjective:  Tea Sams is a 63 y.o. female currently on Vancomycin 1000 mg IV every 24 hours for the treatment of sepsis; pneumonia, day 3 of therapy.    Objective:  Ht: 165.1 cm (65\"); Wt: 44 kg (97 lb)  Estimated Creatinine Clearance: 69 mL/min (by C-G formula based on SCr of 0.58 mg/dL).   Lab Results   Component Value Date    CREATININE 0.58 11/05/2018    CREATININE 0.47 (L) 11/04/2018    CREATININE 0.59 11/03/2018      Lab Results   Component Value Date    WBC 23.10 (H) 11/05/2018    WBC 15.65 (H) 11/04/2018    WBC 19.33 (H) 11/03/2018         Lab Results   Component Value Date    VANCOTROUGH <5.00 (L) 11/05/2018       Culture Results:  Microbiology Results (last 10 days)       Procedure Component Value - Date/Time    S. Pneumo Ag Urine or CSF - Urine, Urine, Clean Catch [[id number]]  (Normal) Collected:  11/04/18 0225    Lab Status:  Final result Specimen:  Urine from Urine, Clean Catch Updated:  11/04/18 0345     Strep Pneumo Ag Negative    Legionella Antigen, Urine - Urine, Urine, Clean Catch [[id number]]  (Normal) Collected:  11/04/18 0225    Lab Status:  Final result Specimen:  Urine from Urine, Clean Catch Updated:  11/04/18 0346     LEGIONELLA ANTIGEN, URINE Negative    Respiratory Culture - Sputum, Cough [[id number]] Collected:  11/04/18 0140    Lab Status:  Preliminary result Specimen:  Sputum from Cough Updated:  11/05/18 0909     Respiratory Culture Light growth (2+) Normal Respiratory Dong     Gram Stain Result Greater than 25 WBCs seen      Moderate (3+) Mixed gram positive dong    Blood Culture - Blood, [[id number]]  (Normal) Collected:  11/03/18 2124    Lab Status:  Preliminary result Specimen:  Blood from " Arm, Left Updated:  11/04/18 2046     Blood Culture No growth at 24 hours    Blood Culture - Blood, [[id number]]  (Normal) Collected:  11/03/18 2124    Lab Status:  Preliminary result Specimen:  Blood from Arm, Left Updated:  11/04/18 2046     Blood Culture No growth at 24 hours    Influenza Antigen, Rapid - Swab, Nasopharynx [[id number]]  (Normal) Collected:  11/03/18 1958    Lab Status:  Final result Specimen:  Swab from Nasopharynx Updated:  11/03/18 2015     Influenza A Ag, EIA Negative     Influenza B Ag, EIA Negative    Narrative:         Recommend confirmation of negative results by viral culture or molecular assay.            Suhail Fam [facility]   11/05/18 2:51 PM

## 2018-11-05 NOTE — PROGRESS NOTES
[facility] Pulmonary Progress Note    Patient: Tea Sams  1955   MR# [id number]   [id number]  11/05/18   10:03 AM  Referring Provider: Matias Snell MD      Problem list:   Patient Active Problem List   Diagnosis   • Sepsis (CMS/HCC)        Chief Complaint: Dyspnea, cough, abnormal lung imaging    Interval history: She says she may be feeling slightly better today.  She continues to cough quite a bit.  She has dyspnea with minimal activity.  She is on 2 L nasal cannula O2 with a sat of 97%.  I talked with Dr. Snell regarding an infectious disease consult and he was agreeable.  The patient has been afebrile overnight.  Her white count is up to 23.   HPI    Allergy: No Known Allergies    Meds:      azithromycin 500 mg Intravenous Q24H   budesonide-formoterol 2 puff Inhalation BID - RT   cefepime 2 g Intravenous Q8H   enoxaparin 40 mg Subcutaneous Q24H   famotidine 20 mg Oral BID   ipratropium-albuterol 3 mL Nebulization Q4H - RT   polyethylene glycol 17 g Oral Daily   predniSONE 40 mg Oral Daily With Breakfast   sodium chloride 3 mL Intravenous Q12H   vancomycin 1,000 mg Intravenous Q24H       Pharmacy Consult - Pharmacy to dose        Review of Systems:   Review of Systems  Constitutional: Positive for activity change, appetite change, chills, fatigue, fever and unexpected weight change.  Currently no fever.  HENT: Negative.    Eyes: Negative.    Respiratory: Positive for cough, chest tightness, shortness of breath and wheezing.    Cardiovascular: Positive for chest pain (Associated with cough).   Gastrointestinal: Negative.  Genitourinary: Negative.      Physical Exam:  Vitals:    11/05/18 0900   BP: 136/74   Pulse: 91   Resp:    Temp:    SpO2: 97%       Intake/Output Summary (Last 24 hours) at 11/05/18 1003  Last data filed at 11/05/18 0908   Gross per 24 hour   Intake             2802 ml   Output             1685 ml   Net             1117 ml       Physical Exam  Constitutional: She is oriented to  person, place, and time. She appears well-developed and well-nourished. She has a sickly appearance. She appears mildly distressed. Nasal cannula in place.   HENT:   Head: Normocephalic and atraumatic.   Eyes: Pupils are equal, round, and reactive to light. No scleral icterus.   Neck: Normal range of motion. Neck supple.   Cardiovascular: Tachycardic rate and regular rhythm.    Pulmonary/Chest: Accessory muscle usage present. Tachypnea noted. She has frequent cough.  She has wheezes. She has rhonchi.   Abdominal: Soft. Bowel sounds are normal. She exhibits no distension.   Genitourinary:   Genitourinary Comments: Arriaga catheter   Musculoskeletal: Normal range of motion. She exhibits no edema.   Neurological: She is alert and oriented to person, place, and time.   Skin: Skin is warm and dry. There is pallor.   Psychiatric: She has a normal mood and affect.   Nursing note and vitals reviewed.    Data:     Results from last 7 days  Lab Units 11/05/18  0548 11/04/18  0311 11/03/18  1941   WBC 10*3/mm3 23.10* 15.65* 19.33*   HEMOGLOBIN g/dL 11.0* 10.9* 12.7   PLATELETS 10*3/mm3 559* 493* 551*         Results from last 7 days  Lab Units 11/05/18  0548 11/04/18  0311 11/03/18  1941   SODIUM mmol/L 143 141 139   POTASSIUM mmol/L 4.3 3.5 4.0   BUN mg/dL 14 7 10   CREATININE mg/dL 0.58 0.47* 0.59         Results from last 7 days  Lab Units 11/04/18  0417 11/04/18  0052 11/03/18  1950   PH, ARTERIAL pH units 7.406 7.395 7.474*   PCO2, ARTERIAL mm Hg 41.6 38.7 33.3*   PO2 ART mm Hg 88.6 75.2* 52.7*         Radiology:  Imaging Results (last 24 hours)     Procedure Component Value Units Date/Time    US Venous Doppler Lower Extremity Bilateral (duplex) [[id number]] Updated:  11/04/18 1630            Pulmonary Assessment:    1. Acute hypoxemic respiratory distress  2. Probable pneumonia  3. Positive sepsis screen initially  4. Pleuritic type chest pain  5. Diffuse lung nodules bilaterally, rule out tuberculosis  6. COPD, she may  very well have underlying asthma as well  7. Leukocytosis, worsening  8. Elevated liver enzymes, hepatitis panel negative  9. Tobacco abuse      Plan:   · Continue in airborne and contact isolation, awaiting Quant Gold results  · Infectious disease has been consulted  · Echo results read as normal  · Continue supplemental oxygen to keep sats greater than 92%.  Could use BiPAP if needed for respiratory distress.  · Sputum cultures so far without significant growth  · PCR viral swab results pending  · Continue breathing treatments and Symbicort  · Current antibiotics of cefepime, azithromycin and vancomycin  · Steroids have been transitioned to oral prednisone 40 mg daily  · Ultrasound results of liver and lower extremities still pending      Electronically signed by HORTENCIA Thibodeaux, 11/05/18, 10:03 AM   Physician substantive contribution:  Pertinent symptoms/interval history include: The patient states she is breathing a bit better today.  She does complain of headache.  Respiratory exam shows pertinent findings of scattered wheezes and rhonchi on chest exam.  Plan includes: We will continue broad-spectrum antibiotics.  I have seen and examined patient personally, performing a face-to-face diagnostic evaluation with plan of care reviewed and developed with APRN and nursing staff. I have addended and/or modified the above history of present illness, physical examination, and assessment and plan to reflect my findings and impressions. Essential elements of the care plan were discussed with APRN above.  Agree with findings and assessment/plan as documented above.    Electronically signed by Melchor Denis MD, on 11/5/2018, 10:55 AM

## 2018-11-05 NOTE — PLAN OF CARE
Problem: Patient Care Overview  Goal: Plan of Care Review  Outcome: Ongoing (interventions implemented as appropriate)  Pt states that she feels much better today than she did yesterday. She has remained afebrile, vital signs stable, no complaints of pain. Pt was able to sleep between care. Adequate urine output.

## 2018-11-05 NOTE — SIGNIFICANT NOTE
Report taken from PIETER Heller. Care assumed for patient. Pt alert and oriented. No complaints at this time. VSS. Call light within reach of patient. Will monitor.

## 2018-11-05 NOTE — PROGRESS NOTES
[facility] Medicine Services  INPATIENT PROGRESS NOTE    Patient Name: Tea Sams  Date of Admission: 11/3/2018  Today's Date: 11/05/18  Length of Stay: 2  Primary Care Physician: Provider, No Known    Subjective   Chief Complaint: shortness of breath   HPI     Patient seen and examined at bedside.  Patient feels as though her breathing is improved.  She still has a very productive cough, she feels as though she will be a wall to set up in a chair today.  Tolerating by mouth intake.  Has not ambulated as of yet.  Denies any fever or chills.    Cough is improving, but still productive.  Denies any blood in sputum.          Review of Systems   Constitutional: Negative for chills and fever.   Respiratory: Positive for cough and shortness of breath.    Cardiovascular: Negative for chest pain and palpitations.   Gastrointestinal: Negative for abdominal distention, abdominal pain, diarrhea, nausea and vomiting.   Neurological: Negative for light-headedness and headaches.   All other systems reviewed and are negative.       All pertinent negatives and positives are as above. All other systems have been reviewed and are negative unless otherwise stated.     Objective    Temp:  [96.5 °F (35.8 °C)-99 °F (37.2 °C)] 96.6 °F (35.9 °C)  Heart Rate:  [] 79  Resp:  [16-26] 23  BP: (107-162)/() 137/77  Physical Exam   Constitutional: She is oriented to person, place, and time. No distress.   HENT:   Head: Normocephalic and atraumatic.   Temporal muscle wasting     Eyes: Conjunctivae are normal. No scleral icterus.   Cardiovascular: Regular rhythm and intact distal pulses.  Exam reveals no gallop and no friction rub.    No murmur heard.  tachycardia   Pulmonary/Chest: Effort normal. No respiratory distress. She has wheezes. She has rales. She exhibits no tenderness.   Abdominal: Soft. Bowel sounds are normal. She exhibits no distension and no mass. There is no tenderness. No hernia.    Musculoskeletal: She exhibits no edema.   Neurological: She is alert and oriented to person, place, and time. No cranial nerve deficit.   Skin: Skin is warm and dry. She is not diaphoretic.   Psychiatric: She has a normal mood and affect. Her behavior is normal.   Nursing note and vitals reviewed.        Results Review:  I have reviewed the labs, radiology results, and diagnostic studies.    Laboratory Data:     Results from last 7 days  Lab Units 11/05/18 0548 11/04/18 0311 11/03/18 1941   WBC 10*3/mm3 23.10* 15.65* 19.33*   HEMOGLOBIN g/dL 11.0* 10.9* 12.7   HEMATOCRIT % 32.9* 31.7* 37.8   PLATELETS 10*3/mm3 559* 493* 551*          Results from last 7 days  Lab Units 11/05/18 0548 11/04/18 0311 11/03/18 1941   SODIUM mmol/L 143 141 139   POTASSIUM mmol/L 4.3 3.5 4.0   CHLORIDE mmol/L 103 105 98   CO2 mmol/L 30.0 22.0* 26.0   BUN mg/dL 14 7 10   CREATININE mg/dL 0.58 0.47* 0.59   CALCIUM mg/dL 9.1 7.6* 9.4   BILIRUBIN mg/dL 0.2  --  0.6   ALK PHOS U/L 593*  --  946*   ALT (SGPT) U/L 109*  --  187*   AST (SGOT) U/L 63*  --  179*   GLUCOSE mg/dL 197* 135* 144*       Culture Data:   [unfilled]    Radiology Data:   Imaging Results (last 24 hours)     Procedure Component Value Units Date/Time    US Venous Doppler Lower Extremity Bilateral (duplex) [[id number]] Updated:  11/04/18 1630          I have reviewed the patient's current medications.     Assessment/Plan     Active Hospital Problems    Diagnosis   • Sepsis (CMS/[facility])       Assessment:  1) Acute hypoxic respiratory failure due to COPD with exacberation  2) Sepsis due to suspected left-sided pneumonia   - Procalcitonin 0.51   - Flu, strep pneumo, legionella, negative  3) Diffuse, bilateral nodular opacities   - CTA per my read shows peribronchular diffuse nodular opacities.  Differential   includes infection (TB, fungal, atypical pneumonia), malignancy  4) Elevated transaminases and alkaline phosphatase   - Acute hepatitis panel pending  5) Leukocytosis  with neutrophil predominance due to multifactorial including pneumonia and corticosteroid  6) Tobacco dependence   7) Normocytic anemia  8) Moderate protein calorie malnutrition, suspected          Plan:  1) O2 to maintain sats >92%  2) Respiratory viral panel pending  3) Quantiferon gold pending  4) Ordered histoplasma urinary antigen  5) Respiratory and blood cultures NGTD  6) Continue vanc and cefepime - If no growth tomorrow will d/c vancomycin   7) Add azithromycin for more atypical coverage - 500 mg daily x 3 days   8) US liver pending  9) Switch from IV solumedrol to PO prednisone 40 mg for 5 days   10) Continue budesonide-formoterol BID, duonebs increased to q4h   11) Monitor LFTs and Hgb (Transfuse <7)  12) ICS and flutter valve   13) Nutrition consult   14) AM CMP, CBC, and procalcitonin    DVT: Switch from heparin to lovenox   GI: Famotidone       Discharge Planning: I expect the patient to be discharged to home pending clinical improvement, suspect ~4 days.    Matias Snell MD   11/05/18   7:11 AM

## 2018-11-06 LAB
ALBUMIN SERPL-MCNC: 3.2 G/DL (ref 3.5–5)
ALBUMIN/GLOB SERPL: 1.1 G/DL (ref 1.1–2.5)
ALP SERPL-CCNC: 482 U/L (ref 24–120)
ALT SERPL W P-5'-P-CCNC: 118 U/L (ref 0–54)
ANION GAP SERPL CALCULATED.3IONS-SCNC: 11 MMOL/L (ref 4–13)
AST SERPL-CCNC: 87 U/L (ref 7–45)
B PERT DNA SPEC QL NAA+PROBE: NOT DETECTED
BACTERIA SPEC RESP CULT: NORMAL
BILIRUB SERPL-MCNC: 0.2 MG/DL (ref 0.1–1)
BUN BLD-MCNC: 23 MG/DL (ref 5–21)
BUN/CREAT SERPL: 43.4 (ref 7–25)
C PNEUM DNA NPH QL NAA+NON-PROBE: NOT DETECTED
CALCIUM SPEC-SCNC: 9.4 MG/DL (ref 8.4–10.4)
CHLORIDE SERPL-SCNC: 102 MMOL/L (ref 98–110)
CO2 SERPL-SCNC: 27 MMOL/L (ref 24–31)
CREAT BLD-MCNC: 0.53 MG/DL (ref 0.5–1.4)
DEPRECATED RDW RBC AUTO: 49.1 FL (ref 40–54)
ERYTHROCYTE [DISTWIDTH] IN BLOOD BY AUTOMATED COUNT: 14.6 % (ref 12–15)
FLUAV H1 2009 PAND RNA NPH QL NAA+PROBE: NOT DETECTED
FLUAV H1 HA GENE NPH QL NAA+PROBE: NOT DETECTED
FLUAV H3 RNA NPH QL NAA+PROBE: NOT DETECTED
FLUAV SUBTYP SPEC NAA+PROBE: NOT DETECTED
FLUBV RNA ISLT QL NAA+PROBE: NOT DETECTED
GFR SERPL CREATININE-BSD FRML MDRD: 117 ML/MIN/1.73
GLOBULIN UR ELPH-MCNC: 3 GM/DL
GLUCOSE BLD-MCNC: 219 MG/DL (ref 70–100)
GRAM STN SPEC: NORMAL
GRAM STN SPEC: NORMAL
HADV DNA SPEC NAA+PROBE: NOT DETECTED
HCOV 229E RNA SPEC QL NAA+PROBE: NOT DETECTED
HCOV HKU1 RNA SPEC QL NAA+PROBE: NOT DETECTED
HCOV NL63 RNA SPEC QL NAA+PROBE: NOT DETECTED
HCOV OC43 RNA SPEC QL NAA+PROBE: NOT DETECTED
HCT VFR BLD AUTO: 29.3 % (ref 37–47)
HGB BLD-MCNC: 9.9 G/DL (ref 12–16)
HMPV RNA NPH QL NAA+NON-PROBE: NOT DETECTED
HPIV1 RNA SPEC QL NAA+PROBE: NOT DETECTED
HPIV2 RNA SPEC QL NAA+PROBE: NOT DETECTED
HPIV3 RNA NPH QL NAA+PROBE: NOT DETECTED
HPIV4 P GENE NPH QL NAA+PROBE: NOT DETECTED
M PNEUMO IGG SER IA-ACNC: NOT DETECTED
MCH RBC QN AUTO: 30.7 PG (ref 28–32)
MCHC RBC AUTO-ENTMCNC: 33.8 G/DL (ref 33–36)
MCV RBC AUTO: 90.7 FL (ref 82–98)
PLATELET # BLD AUTO: 535 10*3/MM3 (ref 130–400)
PMV BLD AUTO: 9.2 FL (ref 6–12)
POTASSIUM BLD-SCNC: 4.1 MMOL/L (ref 3.5–5.3)
PROCALCITONIN SERPL-MCNC: <0.25 NG/ML
PROT SERPL-MCNC: 6.2 G/DL (ref 6.3–8.7)
RBC # BLD AUTO: 3.23 10*6/MM3 (ref 4.2–5.4)
RHINOVIRUS RNA SPEC NAA+PROBE: NOT DETECTED
RSV RNA NPH QL NAA+NON-PROBE: NOT DETECTED
SODIUM BLD-SCNC: 140 MMOL/L (ref 135–145)
WBC NRBC COR # BLD: 29.89 10*3/MM3 (ref 4.8–10.8)

## 2018-11-06 PROCEDURE — 25010000002 AZITHROMYCIN PER 500 MG: Performed by: INTERNAL MEDICINE

## 2018-11-06 PROCEDURE — 87899 AGENT NOS ASSAY W/OPTIC: CPT | Performed by: INTERNAL MEDICINE

## 2018-11-06 PROCEDURE — 85027 COMPLETE CBC AUTOMATED: CPT | Performed by: INTERNAL MEDICINE

## 2018-11-06 PROCEDURE — 87206 SMEAR FLUORESCENT/ACID STAI: CPT | Performed by: INTERNAL MEDICINE

## 2018-11-06 PROCEDURE — 87449 NOS EACH ORGANISM AG IA: CPT | Performed by: INTERNAL MEDICINE

## 2018-11-06 PROCEDURE — 63710000001 PREDNISONE PER 1 MG: Performed by: INTERNAL MEDICINE

## 2018-11-06 PROCEDURE — 25010000002 ENOXAPARIN PER 10 MG: Performed by: INTERNAL MEDICINE

## 2018-11-06 PROCEDURE — 87116 MYCOBACTERIA CULTURE: CPT | Performed by: INTERNAL MEDICINE

## 2018-11-06 PROCEDURE — 94799 UNLISTED PULMONARY SVC/PX: CPT

## 2018-11-06 PROCEDURE — 94760 N-INVAS EAR/PLS OXIMETRY 1: CPT

## 2018-11-06 PROCEDURE — 25010000002 VANCOMYCIN 10 G RECONSTITUTED SOLUTION: Performed by: INTERNAL MEDICINE

## 2018-11-06 PROCEDURE — 25010000002 CEFEPIME PER 500 MG: Performed by: FAMILY MEDICINE

## 2018-11-06 PROCEDURE — 99232 SBSQ HOSP IP/OBS MODERATE 35: CPT | Performed by: INTERNAL MEDICINE

## 2018-11-06 PROCEDURE — 80053 COMPREHEN METABOLIC PANEL: CPT | Performed by: INTERNAL MEDICINE

## 2018-11-06 PROCEDURE — 84145 PROCALCITONIN (PCT): CPT | Performed by: INTERNAL MEDICINE

## 2018-11-06 RX ORDER — SODIUM CHLORIDE, SODIUM LACTATE, POTASSIUM CHLORIDE, CALCIUM CHLORIDE 600; 310; 30; 20 MG/100ML; MG/100ML; MG/100ML; MG/100ML
100 INJECTION, SOLUTION INTRAVENOUS CONTINUOUS
Status: DISPENSED | OUTPATIENT
Start: 2018-11-06 | End: 2018-11-06

## 2018-11-06 RX ADMIN — VANCOMYCIN HYDROCHLORIDE 500 MG: 10 INJECTION, POWDER, LYOPHILIZED, FOR SOLUTION INTRAVENOUS at 04:31

## 2018-11-06 RX ADMIN — CEFEPIME HYDROCHLORIDE 2 G: 2 INJECTION, POWDER, FOR SOLUTION INTRAVENOUS at 00:02

## 2018-11-06 RX ADMIN — ACETAMINOPHEN 650 MG: 325 TABLET, FILM COATED ORAL at 10:05

## 2018-11-06 RX ADMIN — Medication 3 ML: at 08:02

## 2018-11-06 RX ADMIN — PREDNISONE 40 MG: 20 TABLET ORAL at 07:53

## 2018-11-06 RX ADMIN — IPRATROPIUM BROMIDE AND ALBUTEROL SULFATE 3 ML: 2.5; .5 SOLUTION RESPIRATORY (INHALATION) at 15:09

## 2018-11-06 RX ADMIN — ACETAMINOPHEN 650 MG: 325 TABLET, FILM COATED ORAL at 16:01

## 2018-11-06 RX ADMIN — IPRATROPIUM BROMIDE AND ALBUTEROL SULFATE 3 ML: 2.5; .5 SOLUTION RESPIRATORY (INHALATION) at 20:40

## 2018-11-06 RX ADMIN — CEFEPIME HYDROCHLORIDE 2 G: 2 INJECTION, POWDER, FOR SOLUTION INTRAVENOUS at 09:08

## 2018-11-06 RX ADMIN — SODIUM CHLORIDE, POTASSIUM CHLORIDE, SODIUM LACTATE AND CALCIUM CHLORIDE 100 ML/HR: 600; 310; 30; 20 INJECTION, SOLUTION INTRAVENOUS at 09:08

## 2018-11-06 RX ADMIN — Medication 3 ML: at 20:09

## 2018-11-06 RX ADMIN — BUDESONIDE AND FORMOTEROL FUMARATE DIHYDRATE 2 PUFF: 160; 4.5 AEROSOL RESPIRATORY (INHALATION) at 20:19

## 2018-11-06 RX ADMIN — FAMOTIDINE 20 MG: 20 TABLET, FILM COATED ORAL at 08:02

## 2018-11-06 RX ADMIN — FAMOTIDINE 20 MG: 20 TABLET, FILM COATED ORAL at 20:08

## 2018-11-06 RX ADMIN — POLYETHYLENE GLYCOL 3350 17 G: 17 POWDER, FOR SOLUTION ORAL at 08:02

## 2018-11-06 RX ADMIN — CEFEPIME HYDROCHLORIDE 2 G: 2 INJECTION, POWDER, FOR SOLUTION INTRAVENOUS at 16:01

## 2018-11-06 RX ADMIN — AZITHROMYCIN MONOHYDRATE 500 MG: 500 INJECTION, POWDER, LYOPHILIZED, FOR SOLUTION INTRAVENOUS at 07:51

## 2018-11-06 RX ADMIN — ENOXAPARIN SODIUM 40 MG: 40 INJECTION SUBCUTANEOUS at 07:52

## 2018-11-06 RX ADMIN — IPRATROPIUM BROMIDE AND ALBUTEROL SULFATE 3 ML: 2.5; .5 SOLUTION RESPIRATORY (INHALATION) at 23:53

## 2018-11-06 RX ADMIN — IPRATROPIUM BROMIDE AND ALBUTEROL SULFATE 3 ML: 2.5; .5 SOLUTION RESPIRATORY (INHALATION) at 02:56

## 2018-11-06 RX ADMIN — IPRATROPIUM BROMIDE AND ALBUTEROL SULFATE 3 ML: 2.5; .5 SOLUTION RESPIRATORY (INHALATION) at 11:21

## 2018-11-06 NOTE — PLAN OF CARE
Problem: Patient Care Overview  Goal: Plan of Care Review  Outcome: Ongoing (interventions implemented as appropriate)   11/06/18 0500   Coping/Psychosocial   Plan of Care Reviewed With patient   Plan of Care Review   Progress improving   OTHER   Outcome Summary VSS, no c/o pain, remains on 2L NC with sats in the 90s, up to bedside commode often with adequate urine output, continue to monitor        Problem: Fall Risk (Adult)  Goal: Identify Related Risk Factors and Signs and Symptoms  Outcome: Ongoing (interventions implemented as appropriate)    Goal: Absence of Fall  Outcome: Ongoing (interventions implemented as appropriate)      Problem: ARDS (Acute Resp Distress Syndrome) (Adult)  Goal: Signs and Symptoms of Listed Potential Problems Will be Absent, Minimized or Managed (ARDS)  Outcome: Ongoing (interventions implemented as appropriate)

## 2018-11-06 NOTE — PLAN OF CARE
Problem: Patient Care Overview  Goal: Plan of Care Review  Outcome: Ongoing (interventions implemented as appropriate)   11/06/18 1423   Coping/Psychosocial   Plan of Care Reviewed With patient   Plan of Care Review   Progress no change   OTHER   Outcome Summary Pt transferred from CCU. vss. no complaints. will continue to monitor       Problem: Fall Risk (Adult)  Goal: Identify Related Risk Factors and Signs and Symptoms  Outcome: Ongoing (interventions implemented as appropriate)    Goal: Absence of Fall  Outcome: Ongoing (interventions implemented as appropriate)      Problem: ARDS (Acute Resp Distress Syndrome) (Adult)  Goal: Signs and Symptoms of Listed Potential Problems Will be Absent, Minimized or Managed (ARDS)  Outcome: Ongoing (interventions implemented as appropriate)

## 2018-11-06 NOTE — PROGRESS NOTES
[facility] Medicine Services  INPATIENT PROGRESS NOTE    Patient Name: Tea Sams  Date of Admission: 11/3/2018  Today's Date: 11/06/18  Length of Stay: 3  Primary Care Physician: Provider, No Known    Subjective   Chief Complaint: shortness of breath   HPI     Patient seen and examined at bedside.  Patient to consider breathing is much improved.  She still has productive cough she was able to produce a sputum sample for us to send for AFB.  ID saw the patient yesterday, workup is continuing.  Patient is safe to go to the floor as long as we can continue isolation.  Patient is having normal urine output.  She denies any dysuria.  Patient has normal bowel movements, without significant problems.  Patient is not using her incentive spirometer, but she indicates that she will start using it.        Review of Systems   Constitutional: Negative for chills and fever.   Respiratory: Positive for cough (productive). Negative for shortness of breath.    Cardiovascular: Negative for chest pain and palpitations.   Gastrointestinal: Negative for abdominal distention, abdominal pain, constipation, diarrhea, nausea and vomiting.   Genitourinary: Negative for dysuria and urgency.        All pertinent negatives and positives are as above. All other systems have been reviewed and are negative unless otherwise stated.     Objective    Temp:  [96.6 °F (35.9 °C)-98.9 °F (37.2 °C)] 97.1 °F (36.2 °C)  Heart Rate:  [] 64  Resp:  [18-23] 18  BP: (125-165)/(56-95) 158/86  Physical Exam   Constitutional: She is oriented to person, place, and time. No distress.   HENT:   Head: Normocephalic and atraumatic.   Temporal muscle wasting    Eyes: Conjunctivae are normal. No scleral icterus.   Cardiovascular: Regular rhythm, normal heart sounds and intact distal pulses.  Exam reveals no gallop and no friction rub.    No murmur heard.  tachycardia   Pulmonary/Chest: Effort normal. No respiratory distress.  She has wheezes (and rhonchi). She has no rales.   Abdominal: Soft. Bowel sounds are normal. She exhibits no distension and no mass. There is no tenderness. There is no guarding.   Musculoskeletal: She exhibits no edema.   Neurological: She is alert and oriented to person, place, and time.   Skin: Skin is warm and dry. She is not diaphoretic.   Psychiatric: She has a normal mood and affect. Her behavior is normal.   Vitals reviewed.        Results Review:  I have reviewed the labs, radiology results, and diagnostic studies.    Laboratory Data:     Results from last 7 days  Lab Units 11/06/18  0032 11/05/18 0548 11/04/18 0311   WBC 10*3/mm3 29.89* 23.10* 15.65*   HEMOGLOBIN g/dL 9.9* 11.0* 10.9*   HEMATOCRIT % 29.3* 32.9* 31.7*   PLATELETS 10*3/mm3 535* 559* 493*          Results from last 7 days  Lab Units 11/06/18 0032 11/05/18 0548 11/04/18 0311 11/03/18  1941   SODIUM mmol/L 140 143 141 139   POTASSIUM mmol/L 4.1 4.3 3.5 4.0   CHLORIDE mmol/L 102 103 105 98   CO2 mmol/L 27.0 30.0 22.0* 26.0   BUN mg/dL 23* 14 7 10   CREATININE mg/dL 0.53 0.58 0.47* 0.59   CALCIUM mg/dL 9.4 9.1 7.6* 9.4   BILIRUBIN mg/dL 0.2 0.2  --  0.6   ALK PHOS U/L 482* 593*  --  946*   ALT (SGPT) U/L 118* 109*  --  187*   AST (SGOT) U/L 87* 63*  --  179*   GLUCOSE mg/dL 219* 197* 135* 144*       Culture Data:   [unfilled]    Radiology Data:   Imaging Results (last 24 hours)     Procedure Component Value Units Date/Time    US Venous Doppler Lower Extremity Bilateral (duplex) [[id number]] Collected:  11/05/18 1529     Updated:  11/05/18 1532    Narrative:       History: Swelling       Impression:       Impression: There is no evidence of deep venous thrombosis or  superficial thrombophlebitis of right or left lower extremities.     Comments: Bilateral lower extremity venous duplex exam was performed  using color Doppler flow, Doppler waveform analysis, and grayscale  imaging, with and without compression. There is no evidence of  deep  venous thrombosis in the common femoral, superficial femoral, popliteal,  peroneal, anterior tibial, and posterior tibial veins bilaterally. No  thrombus is identified in the saphenofemoral junctions and greater  saphenous veins bilaterally.         This report was finalized on 11/05/2018 15:29 by Dr. Efren Hoang MD.    US Liver [[id number]] Collected:  11/05/18 1106     Updated:  11/05/18 1110    Narrative:       EXAMINATION: US LIVER-.     HISTORY: elevated LFT, elevated alk phos; R93.89-Abnormal findings on  diagnostic imaging of other specified body structures; R94.5-Abnormal  results of liver function studies; J96.01-Acute respiratory failure with  hypoxia; A41.9-Sepsis, unspecified organism; Z91.89-Other specified  personal risk factors, not elsewhere classified; J44.1-Chronic  obstructive pulmonary disease with (acute) exacerbation; D47.3-     COMPARISON: None     PROCEDURE: Real-time sonographic evaluation of the right upper quadrant  was performed. Multiple representative images were obtained and archived  to PACS for review.     FINDINGS:     PANCREAS: The visualized pancreas is normal. The distal body and tail  are obscured due to overlying bowel gas.      LIVER: The liver is uniform in echogenicity. The contour is smooth.  There are no lesions.      GALLBLADDER AND BILE DUCTS: The gallbladder is contracted. There are no  gallstones. There is no pericholecystic fluid. There is no intra-or  extrahepatic biliary ductal dilatation. The common bile duct is  non-dilated and measures 4 millimeters .     RIGHT KIDNEY: The right kidney is unremarkable in appearance .       Impression:       Limited evaluation of the gallbladder as it is contracted.  Grossly, no acute abnormalities.        This report was finalized on 11/05/2018 11:07 by Dr. Maida Murrieta MD.          I have reviewed the patient's current medications.     Assessment/Plan     Active Hospital Problems    Diagnosis   • Sepsis (CMS/HCC)        Assessment:  1) Acute hypoxic respiratory failure due to COPD with exacberation  2) Sepsis due to suspected left-sided pneumonia              - Procalcitonin 0.51 --> >0.25 after 48 hours              - Flu, strep pneumo, legionella, negative  3) Diffuse, bilateral nodular opacities              - CTA per my read shows peribronchular diffuse nodular opacities.  Differential includes infection (TB, fungal, atypical pneumonia), malignancy  4) Elevated transaminases and alkaline phosphatase              - Acute hepatitis panel pending  5) Leukocytosis with neutrophil predominance due to multifactorial including pneumonia and corticosteroid  6) Tobacco dependence   7) Normocytic anemia  8) Moderate protein calorie malnutrition, suspected       Plan:  1) ID consulted, note reviewed.    2) O2 to maintain sats >92%, can wean today  3) Respiratory viral panel pending  4) Blasto, histo, and crypto pending  5) Continue azithromycin and cefepime - D/C vancomycin, low suspicion for MRSA   6) ICS, flutter valve   7) Pulmonary consult, note reviewed.   8) AFB sputum pending, quantiferon gold pending, T spot pending  9) Liver US unremarkable   10) Respiratory and blood cultures pending  11) Continue prednisone 40 mg daily x 5 days  12) Continue symbicort, duonebs q4h     DVT: Lovenox  GI: Famotidine              Discharge Planning: I expect the patient to be discharged to home pending clinical improvement.     Matias Snell MD   11/06/18   6:59 AM

## 2018-11-06 NOTE — PROGRESS NOTES
Malnutrition Severity Assessment    Patient Name:  Tea Sams  YOB: 1955  MRN: [id number]  Admit Date:  11/3/2018    Patient meets criteria for : Severe malnutrition    Comments:  If in agreement please attest.    Malnutrition Type: Chronic Illness Malnutrition     Malnutrition Type (last 8 hours)      Malnutrition Severity Assessment     Row Name 11/06/18 1000       Malnutrition Severity Assessment    Malnutrition Type Chronic Illness Malnutrition    Row Name 11/06/18 1000       Physical Signs of Malnutrition (Chronic)    Muscle Wasting Severe   Corona region moderate; Clavicle Region Moderate; Shoulder/Acromion Process Moderate; Scapula Region Moderate;Upper Thigh moderate;    Fat Loss Severe   Orbital fat pads moderate; Buccal Fat pads moderate; Upper Arm severe; Ribs moderate    Secondary Physical Signs Present (comment)   weakness; dry flaky skin    Row Name 11/06/18 1000       Weight Status (Chronic)    BMI Mod (<17)    %IBW Mod <80%    %UBW Mod (75-85%)    Weight Loss Severe (>10% / 6 mo)    Row Name 11/06/18 1000       Energy Intake Status (Chronic)    Energy Intake Severe (< or equal to 50% / > or equal to 1 mo)    Row Name 11/06/18 1000       Criteria Met (Must meet criteria for severity in at least 2 of these categories: M Wasting, Fat Loss, Fluid, Secondary Signs, Wt. Status, Intake)    Patient meets criteria for  Severe malnutrition          Electronically signed by:  Brandy Ponce MS,RDN,LD  11/06/18 10:02 AM

## 2018-11-06 NOTE — PROGRESS NOTES
"Infectious Diseases Progress Note    Patient:  Tea Sams  YOB: 1955  MRN: [id number]   Admit date: 11/3/2018   Admitting Physician: Matias Snell MD  Primary Care Physician: Provider, No Known    Chief Complaint/Interval History: She indicates she is feeling better.  She was standing at bedside.  Able to ambulate to commode.  She feels she is breathing more easily.  No fever today.  Hemodynamically stable.  Multiple antigen studies and serum QuantiFERON gold pending.  Remains on empiric coverage for bacterial pneumonia.    Intake/Output Summary (Last 24 hours) at 11/06/18 1109  Last data filed at 11/06/18 1000   Gross per 24 hour   Intake             1640 ml   Output             1900 ml   Net             -260 ml     Allergies: No Known Allergies  Current Scheduled Medications:     azithromycin 500 mg Intravenous Q24H   budesonide-formoterol 2 puff Inhalation BID - RT   cefepime 2 g Intravenous Q8H   enoxaparin 40 mg Subcutaneous Q24H   famotidine 20 mg Oral BID   ipratropium-albuterol 3 mL Nebulization Q4H - RT   polyethylene glycol 17 g Oral Daily   predniSONE 40 mg Oral Daily With Breakfast   sodium chloride 3 mL Intravenous Q12H     Current PRN Medications:  •  acetaminophen  •  influenza vaccine  •  Pharmacy Consult - Pharmacy to dose  •  pneumococcal polysaccharide 23-valent  •  sodium chloride  •  sodium chloride    Review of Systems No anginal type chest pain.  No headache.  No hemoptysis.  No hematuria.  No urinary frequency.    Vital Signs:  /79   Pulse 95   Temp 98.3 °F (36.8 °C)   Resp 18   Ht 165.1 cm (65\")   Wt 49.6 kg (109 lb 4.8 oz)   LMP  (Within Years)   SpO2 98%   BMI 18.19 kg/m²     Physical Exam  Vital signs reviewed.  Lungs few scattered crackles.  No wheezing.  No rub.  Heart without murmur  Abdomen soft nontender  General thin and chronically ill-appearing  She looks a little stronger than yesterday  Line/IV site: No erythema, warmth, induration, or " tenderness.    Lab Results:  CBC:   Results from last 7 days  Lab Units 11/06/18  0032 11/05/18  0548 11/04/18  0311 11/03/18  1941   WBC 10*3/mm3 29.89* 23.10* 15.65* 19.33*   HEMOGLOBIN g/dL 9.9* 11.0* 10.9* 12.7   HEMATOCRIT % 29.3* 32.9* 31.7* 37.8   PLATELETS 10*3/mm3 535* 559* 493* 551*     BMP:  Results from last 7 days  Lab Units 11/06/18  0032 11/05/18  0548 11/04/18  0311 11/03/18  1941   SODIUM mmol/L 140 143 141 139   POTASSIUM mmol/L 4.1 4.3 3.5 4.0   CHLORIDE mmol/L 102 103 105 98   CO2 mmol/L 27.0 30.0 22.0* 26.0   BUN mg/dL 23* 14 7 10   CREATININE mg/dL 0.53 0.58 0.47* 0.59   GLUCOSE mg/dL 219* 197* 135* 144*   CALCIUM mg/dL 9.4 9.1 7.6* 9.4   ALT (SGPT) U/L 118* 109*  --  187*     HIV 1/2 antibody-negative  Serum QuantiFERON gold pending  T spot pending  Urine blasto antigen pending  Urine histoplasma antigen pending  Serum cryptococcal antigen pending    Culture Results:   Blood Culture   Date Value Ref Range Status   11/03/2018 No growth at 2 days  Preliminary   11/03/2018 No growth at 2 days  Preliminary     Respiratory Culture   Date Value Ref Range Status   11/04/2018 Light growth (2+) Normal Respiratory Jennifer  Final     Radiology: None  Additional Studies Reviewed: None    Impression:   1.  Diffuse nodular lung opacities, lingular consolidation, mediastinal and hilar adenopathy-feel differential diagnosis remains broad including tuberculosis, blastomycosis, histoplasma, and cancer  2.  Elevated alkaline phosphatase, AST, and ALT  3.  Leukocytosis-infarct steroids and in part likely secondary to pulmonary process    Recommendations:   Continue current empiric antibiotic treatment  Await pending antigen studies  First morning sputum AFB smear and cultures  May need tissue for diagnostic purposes if culture, smears, or antigen studies not diagnostic  Following closely    De Altman MD

## 2018-11-06 NOTE — PROGRESS NOTES
[facility] Pulmonary Progress Note    Patient: Tea Sams  1955   MR# [id number]   [id number]  11/06/18   8:28 AM  Referring Provider: Matias Snell MD      Problem list:   Patient Active Problem List   Diagnosis   • Sepsis (CMS/HCC)        Chief Complaint: Dyspnea, cough, abnormal lung imaging    Interval history: She's coughing some less but did not sleep well last night. She has dyspnea with minimal activity.  She is on 2 L nasal cannula O2 with a sat of 98%.  The patient has been afebrile overnight.  Her white count is up to 29.   HPI    Allergy: No Known Allergies    Meds:      azithromycin 500 mg Intravenous Q24H   budesonide-formoterol 2 puff Inhalation BID - RT   cefepime 2 g Intravenous Q8H   enoxaparin 40 mg Subcutaneous Q24H   famotidine 20 mg Oral BID   ipratropium-albuterol 3 mL Nebulization Q4H - RT   polyethylene glycol 17 g Oral Daily   predniSONE 40 mg Oral Daily With Breakfast   sodium chloride 3 mL Intravenous Q12H   vancomycin 500 mg Intravenous Q12H       Pharmacy Consult - Pharmacy to dose        Review of Systems:   Review of Systems  Constitutional: Positive for activity change, appetite change, chills, fatigue, fever and unexpected weight change.  Currently no fever.  HENT: Negative.    Eyes: Negative.    Respiratory: Positive for cough, chest tightness, shortness of breath and wheezing.    Cardiovascular: Positive for chest pain (Associated with cough).   Gastrointestinal: Negative.  Genitourinary: Negative.      Physical Exam:  Vitals:    11/06/18 0700   BP: 161/90   Pulse: 66   Resp:    Temp: 98.3 °F (36.8 °C)   SpO2: 100%       Intake/Output Summary (Last 24 hours) at 11/06/18 0828  Last data filed at 11/06/18 0751   Gross per 24 hour   Intake             2138 ml   Output             1560 ml   Net              578 ml       Physical Exam  Constitutional: She is oriented to person, place, and time. She appears well-developed and well-nourished. She has a sickly appearance.   Nasal cannula in place.   HENT:   Head: Normocephalic and atraumatic.   Eyes: Pupils are equal, round, and reactive to light. No scleral icterus.   Neck: Normal range of motion. Neck supple.   Cardiovascular: Tachycardic rate and regular rhythm.    Pulmonary/Chest: Accessory muscle usage present. Tachypnea noted. She has frequent cough.  She has less wheezes. She has rhonchi.   Abdominal: Soft. Bowel sounds are normal. She exhibits no distension.   Genitourinary:   Genitourinary Comments: Arriaga catheter   Musculoskeletal: Normal range of motion. She exhibits no edema.   Neurological: She is alert and oriented to person, place, and time.   Skin: Skin is warm and dry. There is pallor.   Psychiatric: She has a normal mood and affect.   Nursing note and vitals reviewed.    Data:     Results from last 7 days  Lab Units 11/06/18  0032 11/05/18  0548 11/04/18  0311   WBC 10*3/mm3 29.89* 23.10* 15.65*   HEMOGLOBIN g/dL 9.9* 11.0* 10.9*   PLATELETS 10*3/mm3 535* 559* 493*         Results from last 7 days  Lab Units 11/06/18  0032 11/05/18  0548 11/04/18  0311   SODIUM mmol/L 140 143 141   POTASSIUM mmol/L 4.1 4.3 3.5   BUN mg/dL 23* 14 7   CREATININE mg/dL 0.53 0.58 0.47*         Results from last 7 days  Lab Units 11/04/18  0417 11/04/18  0052 11/03/18  1950   PH, ARTERIAL pH units 7.406 7.395 7.474*   PCO2, ARTERIAL mm Hg 41.6 38.7 33.3*   PO2 ART mm Hg 88.6 75.2* 52.7*         Radiology:  Imaging Results (last 24 hours)     Procedure Component Value Units Date/Time    US Venous Doppler Lower Extremity Bilateral (duplex) [[id number]] Collected:  11/05/18 1529     Updated:  11/05/18 1532    Narrative:       History: Swelling       Impression:       Impression: There is no evidence of deep venous thrombosis or  superficial thrombophlebitis of right or left lower extremities.     Comments: Bilateral lower extremity venous duplex exam was performed  using color Doppler flow, Doppler waveform analysis, and grayscale  imaging,  with and without compression. There is no evidence of deep  venous thrombosis in the common femoral, superficial femoral, popliteal,  peroneal, anterior tibial, and posterior tibial veins bilaterally. No  thrombus is identified in the saphenofemoral junctions and greater  saphenous veins bilaterally.         This report was finalized on 11/05/2018 15:29 by Dr. Efren Hoang MD.    US Liver [[id number]] Collected:  11/05/18 1106     Updated:  11/05/18 1110    Narrative:       EXAMINATION: US LIVER-.     HISTORY: elevated LFT, elevated alk phos; R93.89-Abnormal findings on  diagnostic imaging of other specified body structures; R94.5-Abnormal  results of liver function studies; J96.01-Acute respiratory failure with  hypoxia; A41.9-Sepsis, unspecified organism; Z91.89-Other specified  personal risk factors, not elsewhere classified; J44.1-Chronic  obstructive pulmonary disease with (acute) exacerbation; D47.3-     COMPARISON: None     PROCEDURE: Real-time sonographic evaluation of the right upper quadrant  was performed. Multiple representative images were obtained and archived  to PACS for review.     FINDINGS:     PANCREAS: The visualized pancreas is normal. The distal body and tail  are obscured due to overlying bowel gas.      LIVER: The liver is uniform in echogenicity. The contour is smooth.  There are no lesions.      GALLBLADDER AND BILE DUCTS: The gallbladder is contracted. There are no  gallstones. There is no pericholecystic fluid. There is no intra-or  extrahepatic biliary ductal dilatation. The common bile duct is  non-dilated and measures 4 millimeters .     RIGHT KIDNEY: The right kidney is unremarkable in appearance .       Impression:       Limited evaluation of the gallbladder as it is contracted.  Grossly, no acute abnormalities.        This report was finalized on 11/05/2018 11:07 by Dr. Maida Murrieta MD.            Pulmonary Assessment:    1. Acute hypoxemic respiratory distress  2. Probable  pneumonia  3. Positive sepsis screen initially  4. Pleuritic type chest pain  5. Diffuse lung nodules bilaterally, rule out tuberculosis  6. COPD, she may very well have underlying asthma as well  7. Leukocytosis, worsening  8. Elevated liver enzymes, hepatitis panel negative  9. Tobacco abuse      Plan:   · Continue in airborne and contact isolation. Attending is moving her to the floor today.   · Awaiting Quant Gold results  · Blasto antigen pending   · Infectious disease is following   · Continue supplemental oxygen to keep sats greater than 92%.   · Sputum cultures so far without significant growth  · PCR viral swab results pending  · Continue breathing treatments and Symbicort  · Current antibiotics of cefepime, azithromycin and vancomycin  · Continue oral prednisone 40 mg daily  · Liver U/S results reviewed   · Negative for DVT    Electronically signed by HORTENCIA Thibodeaux, 11/06/18, 8:28 AM     Physician substantive contribution:  Pertinent symptoms/interval history include: The patient is definitely improved clinically.  She is less dyspneic.  Respiratory exam shows pertinent findings of improved air movement on chest exam although some expiratory rhonchi persists.  Plan includes: Again workup is in progress regarding the possibility of a granulomatous infectious process.  I have seen and examined patient personally, performing a face-to-face diagnostic evaluation with plan of care reviewed and developed with APRN and nursing staff. I have addended and/or modified the above history of present illness, physical examination, and assessment and plan to reflect my findings and impressions. Essential elements of the care plan were discussed with APRN above.  Agree with findings and assessment/plan as documented above.    Electronically signed by Melchor Denis MD, on 11/6/2018, 5:17 PM

## 2018-11-07 ENCOUNTER — APPOINTMENT (OUTPATIENT)
Dept: GENERAL RADIOLOGY | Facility: HOSPITAL | Age: 63
End: 2018-11-07

## 2018-11-07 LAB
H CAPSUL AG UR-MCNC: <0.5 NG/ML
Lab: NORMAL
TSPOT INTERPRETATION: NEGATIVE
TSPOT NIL CONTROL INTERPRETATION: NORMAL
TSPOT PANEL A: 0
TSPOT PANEL B: 0
TSPOT POS CONTROL INTERPRETATION: NORMAL

## 2018-11-07 PROCEDURE — 63710000001 PREDNISONE PER 1 MG: Performed by: INTERNAL MEDICINE

## 2018-11-07 PROCEDURE — 87206 SMEAR FLUORESCENT/ACID STAI: CPT | Performed by: INTERNAL MEDICINE

## 2018-11-07 PROCEDURE — 71045 X-RAY EXAM CHEST 1 VIEW: CPT

## 2018-11-07 PROCEDURE — 25010000002 AZITHROMYCIN PER 500 MG: Performed by: INTERNAL MEDICINE

## 2018-11-07 PROCEDURE — 25010000002 CEFEPIME PER 500 MG: Performed by: FAMILY MEDICINE

## 2018-11-07 PROCEDURE — 94799 UNLISTED PULMONARY SVC/PX: CPT

## 2018-11-07 PROCEDURE — 25010000002 ENOXAPARIN PER 10 MG: Performed by: INTERNAL MEDICINE

## 2018-11-07 PROCEDURE — 94760 N-INVAS EAR/PLS OXIMETRY 1: CPT

## 2018-11-07 PROCEDURE — 87116 MYCOBACTERIA CULTURE: CPT | Performed by: INTERNAL MEDICINE

## 2018-11-07 PROCEDURE — 99232 SBSQ HOSP IP/OBS MODERATE 35: CPT | Performed by: INTERNAL MEDICINE

## 2018-11-07 RX ORDER — MELOXICAM 7.5 MG/1
15 TABLET ORAL DAILY
Status: DISCONTINUED | OUTPATIENT
Start: 2018-11-07 | End: 2018-11-07

## 2018-11-07 RX ORDER — AZITHROMYCIN 250 MG/1
500 TABLET, FILM COATED ORAL
Status: DISCONTINUED | OUTPATIENT
Start: 2018-11-07 | End: 2018-11-07 | Stop reason: SDUPTHER

## 2018-11-07 RX ORDER — CEFDINIR 300 MG/1
300 CAPSULE ORAL EVERY 12 HOURS SCHEDULED
Status: DISCONTINUED | OUTPATIENT
Start: 2018-11-07 | End: 2018-11-08 | Stop reason: HOSPADM

## 2018-11-07 RX ORDER — FLUTICASONE PROPIONATE 50 MCG
2 SPRAY, SUSPENSION (ML) NASAL DAILY
Status: DISCONTINUED | OUTPATIENT
Start: 2018-11-07 | End: 2018-11-08 | Stop reason: HOSPADM

## 2018-11-07 RX ORDER — AZITHROMYCIN 250 MG/1
500 TABLET, FILM COATED ORAL
Status: DISCONTINUED | OUTPATIENT
Start: 2018-11-08 | End: 2018-11-08 | Stop reason: HOSPADM

## 2018-11-07 RX ORDER — MELOXICAM 7.5 MG/1
15 TABLET ORAL DAILY PRN
Status: DISCONTINUED | OUTPATIENT
Start: 2018-11-08 | End: 2018-11-08 | Stop reason: HOSPADM

## 2018-11-07 RX ADMIN — IPRATROPIUM BROMIDE AND ALBUTEROL SULFATE 3 ML: 2.5; .5 SOLUTION RESPIRATORY (INHALATION) at 07:30

## 2018-11-07 RX ADMIN — ACETAMINOPHEN 650 MG: 325 TABLET, FILM COATED ORAL at 19:50

## 2018-11-07 RX ADMIN — Medication 3 ML: at 19:52

## 2018-11-07 RX ADMIN — FAMOTIDINE 20 MG: 20 TABLET, FILM COATED ORAL at 08:33

## 2018-11-07 RX ADMIN — FAMOTIDINE 20 MG: 20 TABLET, FILM COATED ORAL at 19:51

## 2018-11-07 RX ADMIN — BUDESONIDE AND FORMOTEROL FUMARATE DIHYDRATE 2 PUFF: 160; 4.5 AEROSOL RESPIRATORY (INHALATION) at 19:14

## 2018-11-07 RX ADMIN — IPRATROPIUM BROMIDE AND ALBUTEROL SULFATE 3 ML: 2.5; .5 SOLUTION RESPIRATORY (INHALATION) at 23:39

## 2018-11-07 RX ADMIN — IPRATROPIUM BROMIDE AND ALBUTEROL SULFATE 3 ML: 2.5; .5 SOLUTION RESPIRATORY (INHALATION) at 15:03

## 2018-11-07 RX ADMIN — BUDESONIDE AND FORMOTEROL FUMARATE DIHYDRATE 2 PUFF: 160; 4.5 AEROSOL RESPIRATORY (INHALATION) at 07:37

## 2018-11-07 RX ADMIN — FLUTICASONE PROPIONATE 2 SPRAY: 50 SPRAY, METERED NASAL at 10:18

## 2018-11-07 RX ADMIN — IPRATROPIUM BROMIDE AND ALBUTEROL SULFATE 3 ML: 2.5; .5 SOLUTION RESPIRATORY (INHALATION) at 03:10

## 2018-11-07 RX ADMIN — IPRATROPIUM BROMIDE AND ALBUTEROL SULFATE 3 ML: 2.5; .5 SOLUTION RESPIRATORY (INHALATION) at 19:14

## 2018-11-07 RX ADMIN — CEFEPIME HYDROCHLORIDE 2 G: 2 INJECTION, POWDER, FOR SOLUTION INTRAVENOUS at 11:46

## 2018-11-07 RX ADMIN — POLYETHYLENE GLYCOL 3350 17 G: 17 POWDER, FOR SOLUTION ORAL at 08:34

## 2018-11-07 RX ADMIN — AZITHROMYCIN MONOHYDRATE 500 MG: 500 INJECTION, POWDER, LYOPHILIZED, FOR SOLUTION INTRAVENOUS at 09:05

## 2018-11-07 RX ADMIN — ACETAMINOPHEN 650 MG: 325 TABLET, FILM COATED ORAL at 03:27

## 2018-11-07 RX ADMIN — MELOXICAM 15 MG: 7.5 TABLET ORAL at 13:35

## 2018-11-07 RX ADMIN — IPRATROPIUM BROMIDE AND ALBUTEROL SULFATE 3 ML: 2.5; .5 SOLUTION RESPIRATORY (INHALATION) at 11:20

## 2018-11-07 RX ADMIN — PREDNISONE 40 MG: 20 TABLET ORAL at 08:34

## 2018-11-07 RX ADMIN — CEFEPIME HYDROCHLORIDE 2 G: 2 INJECTION, POWDER, FOR SOLUTION INTRAVENOUS at 00:36

## 2018-11-07 RX ADMIN — CEFDINIR 300 MG: 300 CAPSULE ORAL at 19:51

## 2018-11-07 RX ADMIN — ENOXAPARIN SODIUM 40 MG: 40 INJECTION SUBCUTANEOUS at 08:34

## 2018-11-07 NOTE — PLAN OF CARE
Problem: Patient Care Overview  Goal: Plan of Care Review  Outcome: Ongoing (interventions implemented as appropriate)   11/07/18 1321   Coping/Psychosocial   Plan of Care Reviewed With patient   Plan of Care Review   Progress improving   OTHER   Outcome Summary pt on RA. no s/s respiratory distress. vss. c/o arthritic pain at 10/10. will continue to monitor       Problem: Fall Risk (Adult)  Goal: Identify Related Risk Factors and Signs and Symptoms  Outcome: Ongoing (interventions implemented as appropriate)    Goal: Absence of Fall  Outcome: Ongoing (interventions implemented as appropriate)      Problem: ARDS (Acute Resp Distress Syndrome) (Adult)  Goal: Signs and Symptoms of Listed Potential Problems Will be Absent, Minimized or Managed (ARDS)  Outcome: Ongoing (interventions implemented as appropriate)

## 2018-11-07 NOTE — PLAN OF CARE
Problem: Patient Care Overview  Goal: Plan of Care Review  Outcome: Ongoing (interventions implemented as appropriate)    Goal: Individualization and Mutuality  Outcome: Ongoing (interventions implemented as appropriate)   11/04/18 0018   Mutuality/Individual Preferences   What Anxieties, Fears, Concerns, or Questions Do You Have About Your Care? none      11/04/18 0018 11/07/18 0537   Individualization   Patient Specific Interventions --  Daily sputum specimens.   Mutuality/Individual Preferences   What Anxieties, Fears, Concerns, or Questions Do You Have About Your Care? none --      Goal: Interprofessional Rounds/Family Conf  Outcome: Ongoing (interventions implemented as appropriate)      Problem: Fall Risk (Adult)  Goal: Identify Related Risk Factors and Signs and Symptoms  Outcome: Ongoing (interventions implemented as appropriate)   11/07/18 0537   Fall Risk (Adult)   Related Risk Factors (Fall Risk) environment unfamiliar   Signs and Symptoms (Fall Risk) presence of risk factors;other (see comments)     Goal: Absence of Fall  Outcome: Ongoing (interventions implemented as appropriate)   11/07/18 0537   Fall Risk (Adult)   Absence of Fall making progress toward outcome       Problem: ARDS (Acute Resp Distress Syndrome) (Adult)  Goal: Signs and Symptoms of Listed Potential Problems Will be Absent, Minimized or Managed (ARDS)  Outcome: Ongoing (interventions implemented as appropriate)   11/07/18 0537   Goal/Outcome Evaluation   Problems Assessed (Acute Respiratory Distress Syndrome) all   Problems Present (ARDS) situational response

## 2018-11-07 NOTE — PROGRESS NOTES
[facility] Medicine Services  INPATIENT PROGRESS NOTE    Patient Name: Tea Sams  Date of Admission: 11/3/2018  Today's Date: 11/07/18  Length of Stay: 4  Primary Care Physician: Provider, No Known    Subjective   Chief Complaint: shortness of breath  HPI     Patient overall doing well.  She has no complaints this morning.  Her shortness of breath is greatly improved.  She is off oxygen.  She is ambulating around room without any issues.  First AFB stain has been negative, pending repeat.  Tspot was negative.    She is having some mild pain associated with her arthritis and requesting medication.  She does not typically take anything at home.      Cough has improved and is less productive.  She is attempting to provide a sputum sample from this morning.         Review of Systems   Constitutional: Negative for chills and fever.   Respiratory: Positive for cough (less productive). Negative for shortness of breath.    Cardiovascular: Negative for chest pain and palpitations.   Gastrointestinal: Negative for abdominal distention, abdominal pain, constipation, diarrhea and nausea.        All pertinent negatives and positives are as above. All other systems have been reviewed and are negative unless otherwise stated.     Objective    Temp:  [97 °F (36.1 °C)-98.4 °F (36.9 °C)] 98.3 °F (36.8 °C)  Heart Rate:  [] 99  Resp:  [14-22] 18  BP: (128-148)/(68-88) 128/70  Physical Exam  Constitutional: She is oriented to person, place, and time. No distress.   HENT:   Head: Normocephalic and atraumatic.   Temporal muscle wasting    Eyes: Conjunctivae are normal. No scleral icterus.   Cardiovascular: Regular rhythm, normal heart sounds and intact distal pulses.  Exam reveals no gallop and no friction rub.  No murmur heard. Tachycardia   Pulmonary/Chest: Effort normal. No respiratory distress. She has end expiratory wheezes.  She has no rales.   Abdominal: Soft. Bowel sounds are normal.  She exhibits no distension and no mass. There is no tenderness. There is no guarding.   Musculoskeletal: She exhibits no edema.   Neurological: She is alert and oriented to person, place, and time.   Skin: Skin is warm and dry. She is not diaphoretic.   Psychiatric: She has a normal mood and affect. Her behavior is normal.   Vitals reviewed.      Results Review:  I have reviewed the labs, radiology results, and diagnostic studies.    Laboratory Data:     Results from last 7 days  Lab Units 11/06/18 0032 11/05/18 0548 11/04/18 0311   WBC 10*3/mm3 29.89* 23.10* 15.65*   HEMOGLOBIN g/dL 9.9* 11.0* 10.9*   HEMATOCRIT % 29.3* 32.9* 31.7*   PLATELETS 10*3/mm3 535* 559* 493*          Results from last 7 days  Lab Units 11/06/18 0032 11/05/18 0548 11/04/18 0311 11/03/18  1941   SODIUM mmol/L 140 143 141 139   POTASSIUM mmol/L 4.1 4.3 3.5 4.0   CHLORIDE mmol/L 102 103 105 98   CO2 mmol/L 27.0 30.0 22.0* 26.0   BUN mg/dL 23* 14 7 10   CREATININE mg/dL 0.53 0.58 0.47* 0.59   CALCIUM mg/dL 9.4 9.1 7.6* 9.4   BILIRUBIN mg/dL 0.2 0.2  --  0.6   ALK PHOS U/L 482* 593*  --  946*   ALT (SGPT) U/L 118* 109*  --  187*   AST (SGOT) U/L 87* 63*  --  179*   GLUCOSE mg/dL 219* 197* 135* 144*       Culture Data:   [facility]    Radiology Data:   Imaging Results (last 24 hours)     ** No results found for the last 24 hours. **          I have reviewed the patient's current medications.     Assessment/Plan     Active Hospital Problems    Diagnosis   • Sepsis (CMS/[facility])       1) Acute hypoxic respiratory failure due to COPD with exacberation  2) Sepsis due to suspected left-sided pneumonia              - Procalcitonin 0.51 --> >0.25 after 48 hours              - Flu, strep pneumo, legionella, negative  3) Diffuse, bilateral nodular opacities              - CTA per my read shows peribronchular diffuse nodular opacities.    4) Elevated transaminases and alkaline phosphatase              - Acute hepatitis panel negative  5) Leukocytosis  with neutrophil predominance due to multifactorial including pneumonia and corticosteroid  6) Tobacco dependence   7) Normocytic anemia  8) Severe protein calorie malnutrition        Plan:  1) ID consulted, note reviewed.    2) O2 to maintain sats >92%, can wean today  3) Respiratory viral panel pending  4) Blasto negative; histo, and crypto pending  5) Continue azithromycin and cefepime - D/C vancomycin, low suspicion for MRSA   6) ICS, flutter valve   7) Pulmonary consult, note reviewed.  ID consult, note reviewed.   8) AFB sputum gram stain negative (culture pending), quantiferon gold pending, T spot negative  9) Liver US unremarkable   10) Respiratory and blood cultures pending  11) Continue prednisone 40 mg daily x 5 days  12) Continue symbicort, duonebs q4h   13) Nutritional supplementation per nutrition, they are following  14) Patient may be able to come out of isolation - Will defer to ID  15) Further descalation of antibiotics per ID  16) Will need repeat CT scan in 6-8 weeks  17) Leukocytosis worsened today, but clinically greatly improved  18) Continue to monitor Hgb (9.9 today), AST/ALT still remain elevated - Will check with AM labs      DVT: Lovenox  GI: Famotidine              Discharge Planning: I expect the patient to be discharged to home in 1-3 days    Matias Snell MD   11/07/18   9:49 AM

## 2018-11-07 NOTE — PROGRESS NOTES
RT Nebulizer Protocol    Assessment tool to be used for patients with existing breathing treatments ordered by hospitalists                                                                  0  1  2  3  4      Respiratory History   No Smoking      Smoking History      1 Pack/Day      Pulmonary Disease   x   Exacerbation        Respiratory Rate   Normal      20-25   x   Dyspneic      Accessory Muscles      Severe Dyspnea        Breath Sounds   Clear      Crackles      Crackles/ Rhonchi      Wheezing   x   Absent/ Severe Wheezing        Chest   X-ray   Clear      1 Lobe Infiltration/ Consolidation/ PE      2 Lobe Same Lung Infiltration/ Consolidation/ PE       2 Lobe Infiltration/ Both Lungs/ Consolidation/ PE      Both Lungs/ More Than 1 Lobe/ Atelectasis/ Consolidation/  PE   x     Cough   Strong Non- Productive   x   Excessive Secretions/ Strong Cough        Excessive Secretions/ Weak Cough        Thick Bronchial Secretions/ Weak Cough        Thick Bronchial Secretions/ No Cough          Total Patient Score = 11    0-4=Q4 PRN  5-9=TID and Q4 PRN  10-14=QID and Q3 PRN  15-19=Q4 and Q2 PRN  20=Q3 and Q2 PRN    Bronchopulmonary Hygiene (CPT)   Q4 ATC Copious secretions, dyspnea, unable to sleep, mucus plug    QID & Q4 PRN Moderate secretions x   TID Small amounts of secretions w/ poor cough and history of secretions    BID Unable to deep breathe and cough spontaneously       Lung Expansion Therapy (PEP)   Q4 & PRN at night Severe atelectasis, poor oxygenation    QID  High risk for persistent atelectasis, existence of same x   TID At risk for developing atelectasis    BID Unable to deep breathe and cough spontaneously     Instruct, 1 follow up Patients able to perform well on their own

## 2018-11-07 NOTE — PROGRESS NOTES
[facility] Pulmonary Progress Note    Patient: Tea Sams  1955   MR# [id number]   Acct# [id number]  11/07/18   9:25 AM  Referring Provider: Matias Snell MD      Problem list:   Patient Active Problem List   Diagnosis   • Sepsis (CMS/HCC)        Chief Complaint: Dyspnea, cough, abnormal lung imaging    Interval history: She looks much improved today.  She is sitting up in bed on room air.  She continues to have cough but it is less.  Blood cultures are without growth over the past 3 days, PCR viral swab is negative.  Liver enzymes continue to slowly increase.  She is complaining of right ear fullness.  I have encouraged her get up and shower today.     HPI    Allergy: No Known Allergies    Meds:      azithromycin 500 mg Intravenous Q24H   budesonide-formoterol 2 puff Inhalation BID - RT   cefepime 2 g Intravenous Q8H   enoxaparin 40 mg Subcutaneous Q24H   famotidine 20 mg Oral BID   fluticasone 2 spray Each Nare Daily   ipratropium-albuterol 3 mL Nebulization Q4H - RT   polyethylene glycol 17 g Oral Daily   predniSONE 40 mg Oral Daily With Breakfast   sodium chloride 3 mL Intravenous Q12H       Pharmacy Consult - Pharmacy to dose        Review of Systems:   Review of Systems  Constitutional: Positive for activity change, appetite change, chills, fatigue, fever and unexpected weight change.   HENT: Right-sided ear fullness  Eyes: Negative.    Respiratory: Positive for cough, improving dyspnea.  Cardiovascular: Positive for chest pain (Associated with cough).   Gastrointestinal: Negative.  Genitourinary: Negative.      Physical Exam:  Vitals:    11/07/18 0837   BP: 128/70   Pulse: 99   Resp: 18   Temp: 98.3 °F (36.8 °C)   SpO2: 95%       Intake/Output Summary (Last 24 hours) at 11/07/18 0925  Last data filed at 11/06/18 2011   Gross per 24 hour   Intake              800 ml   Output              600 ml   Net              200 ml       Physical Exam  Constitutional: She is oriented to person, place, and time.  She appears well-developed and well-nourished. She has a sickly appearance.  She is on room air.  HENT:   Head: Normocephalic and atraumatic.   Eyes: Pupils are equal, round, and reactive to light. No scleral icterus.   Ears: Purulent effusion on the right TM, minimal effusion on the left TM.   Neck: Normal range of motion. Neck supple.   Cardiovascular: Tachycardic rate and regular rhythm.    Pulmonary/Chest: No distress this morning.  She has frequent cough.  She has less wheezes.   Abdominal: Soft. Bowel sounds are normal. She exhibits no distension.  Musculoskeletal: Normal range of motion. She exhibits no edema.   Neurological: She is alert and oriented to person, place, and time.   Skin: Skin is warm and dry. There is some pallor.   Psychiatric: She has a normal mood and affect.   Nursing note and vitals reviewed.    Data:     Results from last 7 days  Lab Units 11/06/18  0032 11/05/18  0548 11/04/18  0311   WBC 10*3/mm3 29.89* 23.10* 15.65*   HEMOGLOBIN g/dL 9.9* 11.0* 10.9*   PLATELETS 10*3/mm3 535* 559* 493*         Results from last 7 days  Lab Units 11/06/18  0032 11/05/18  0548 11/04/18  0311   SODIUM mmol/L 140 143 141   POTASSIUM mmol/L 4.1 4.3 3.5   BUN mg/dL 23* 14 7   CREATININE mg/dL 0.53 0.58 0.47*         Results from last 7 days  Lab Units 11/04/18  0417 11/04/18  0052 11/03/18  1950   PH, ARTERIAL pH units 7.406 7.395 7.474*   PCO2, ARTERIAL mm Hg 41.6 38.7 33.3*   PO2 ART mm Hg 88.6 75.2* 52.7*         Radiology:  Imaging Results (last 24 hours)     ** No results found for the last 24 hours. **            Pulmonary Assessment:    1. Acute hypoxemic respiratory distress  2. Probable pneumonia  3. Positive sepsis screen initially  4. Pleuritic type chest pain  5. Diffuse lung nodules bilaterally, rule out tuberculosis  6. COPD, she may very well have underlying asthma as well  7. Leukocytosis, worsening  8. Elevated liver enzymes, hepatitis panel negative  9. Tobacco abuse  10. Otitis media with  effusion, right side      Plan:   · Continue in airborne and contact isolation.   · Awaiting Quant Gold results  · Blasto antigen pending   · Infectious disease is following   · Flonase nasal spray twice a day  · Continue supplemental oxygen to keep sats greater than 92%.   · Sputum cultures so far without significant growth, AFB negative  · PCR viral swab negative  · Continue breathing treatments and Symbicort  · Current antibiotics of cefepime, azithromycin   · Continue oral prednisone 40 mg daily  · She is making good progress in her improvement    Electronically signed by HORTENCIA Thibodeaux, 11/07/18, 9:25 AM   Physician substantive contribution:  Pertinent symptoms/interval history include: The patient is much improved clinically.  She is breathing comfortably on room air.  Respiratory exam shows pertinent findings of improved air movement on chest exam.  Plan includes: Her QuantiFERON study came back negative.  I think it is very unlikely she has tuberculosis but we can certainly wait on her pending AFB smears.  Otherwise she could probably be transitioned to oral antibiotics soon and we can get an outpatient follow-up chest CT in several weeks.  I have seen and examined patient personally, performing a face-to-face diagnostic evaluation with plan of care reviewed and developed with APRN and nursing staff. I have addended and/or modified the above history of present illness, physical examination, and assessment and plan to reflect my findings and impressions. Essential elements of the care plan were discussed with APRN above.  Agree with findings and assessment/plan as documented above.    Electronically signed by Melchor Denis MD, on 11/7/2018, 1:29 PM

## 2018-11-07 NOTE — PROGRESS NOTES
Infectious Diseases Progress Note    Patient:  Tea Sams  YOB: 1955  MRN: [id number]   Admit date: 11/3/2018   Admitting Physician: Matias Snell MD  Primary Care Physician: Provider, No Known    Chief Complaint/Interval History: She is feeling better.  She is not having productive cough at present.  She is not having fevers or chills.  She feels she is breathing more easily.  She feels definite improvement from admission.  She indicates 18 months ago she was incarcerated for about 6 months.  She indicates she had 2 or 3 test for tuberculosis during her incarceration that were negative.  She has never had a positive test for tuberculosis.    Reviewed prior chest x-ray from 10/20/2017.  Reviewed chest x-ray done mid-September at [facility].  Reviewed chest x-ray this admission and her CT this admission.  I reviewed all of these with radiology.  The nodular and interstitial infiltrates present on current film are clearly new from October 2017.  She had some small little nodular areas on her x-ray mid-September, but current x-ray findings and interstitial findings seem to be worse relative to the September film.  She seemed to have a little bilateral hilar fullness on her September film which was new in comparison to the October 2017 film.  Her current CT of the chest shows hilar and mediastinal adenopathy.    Intake/Output Summary (Last 24 hours) at 11/07/18 1527  Last data filed at 11/06/18 2011   Gross per 24 hour   Intake              580 ml   Output                0 ml   Net              580 ml     Allergies: No Known Allergies  Current Scheduled Medications:     budesonide-formoterol 2 puff Inhalation BID - RT   cefepime 2 g Intravenous Q8H   enoxaparin 40 mg Subcutaneous Q24H   famotidine 20 mg Oral BID   fluticasone 2 spray Each Nare Daily   ipratropium-albuterol 3 mL Nebulization Q4H - RT   polyethylene glycol 17 g Oral Daily   predniSONE 40 mg Oral Daily With Breakfast   sodium  "chloride 3 mL Intravenous Q12H     Current PRN Medications:  •  acetaminophen  •  influenza vaccine  •  [START ON 11/8/2018] meloxicam  •  Pharmacy Consult - Pharmacy to dose  •  pneumococcal polysaccharide 23-valent  •  sodium chloride  •  sodium chloride    Review of Systems    Vital Signs:  /88 (BP Location: Right arm, Patient Position: Lying)   Pulse 90   Temp 98.5 °F (36.9 °C) (Temporal Artery )   Resp 20   Ht 165.1 cm (65\")   Wt 51.8 kg (114 lb 4.8 oz)   LMP  (Within Years)   SpO2 95%   BMI 19.02 kg/m²     Physical Exam  Vital signs reviewed.  Line/IV site: No erythema, warmth, induration, or tenderness.  No cervical supraclavicular axillary or inguinal adenopathy  Lungs fairly clear.  She has better air movement.  Previously had prolonged expiratory phase.  It seems to have improved.  No wheezes.  Very rare scattered fine crackle.  Abdomen is soft and nontender. No hepatosplenomegaly.    Lab Results:  CBC:   Results from last 7 days  Lab Units 11/06/18 0032 11/05/18 0548 11/04/18 0311 11/03/18 1941   WBC 10*3/mm3 29.89* 23.10* 15.65* 19.33*   HEMOGLOBIN g/dL 9.9* 11.0* 10.9* 12.7   HEMATOCRIT % 29.3* 32.9* 31.7* 37.8   PLATELETS 10*3/mm3 535* 559* 493* 551*     BMP:  Results from last 7 days  Lab Units 11/06/18  0032 11/05/18 0548 11/04/18 0311 11/03/18 1941   SODIUM mmol/L 140 143 141 139   POTASSIUM mmol/L 4.1 4.3 3.5 4.0   CHLORIDE mmol/L 102 103 105 98   CO2 mmol/L 27.0 30.0 22.0* 26.0   BUN mg/dL 23* 14 7 10   CREATININE mg/dL 0.53 0.58 0.47* 0.59   GLUCOSE mg/dL 219* 197* 135* 144*   CALCIUM mg/dL 9.4 9.1 7.6* 9.4   ALT (SGPT) U/L 118* 109*  --  187*     T spot negative  Urine histoplasma antigen negative  Urine Blastomyces antigen negative  Serum cryptococcal antigen negative  Sputum AFB smear-negative  HIV 1/2 antibody-negative  Respiratory panel (PCR)-negative  Nasopharynx; Swab          Ref Range & Units 3d ago   ADENOVIRUS, PCR Not Detected Not Detected    Coronavirus 229E Not " Detected Not Detected    Coronavirus HKU1 Not Detected Not Detected    Coronavirus NL63 Not Detected Not Detected    Coronavirus OC43 Not Detected Not Detected    Human Metapneumovirus Not Detected Not Detected    Human Rhinovirus/Enterovirus Not Detected Not Detected    Influenza B PCR Not Detected Not Detected    Parainfluenza Virus 1 Not Detected Not Detected    Parainfluenza Virus 2 Not Detected Not Detected    Parainfluenza Virus 3 Not Detected Not Detected    Parainfluenza Virus 4 Not Detected Not Detected    Bordetella pertussis pcr Not Detected Not Detected    Influenza A H1 2009 PCR Not Detected Not Detected    Chlamydophila pneumoniae PCR Not Detected Not Detected    Mycoplasma pneumo by PCR Not Detected Not Detected    Influenza A PCR Not Detected Not Detected    Influenza A H3 Not Detected Not Detected    Influenza A H1 Not Detected Not Detected    RSV, PCR Not Detected Not Detected           Culture Results:   Blood Culture   Date Value Ref Range Status   11/03/2018 No growth at 3 days  Preliminary   11/03/2018 No growth at 3 days  Preliminary     Respiratory Culture   Date Value Ref Range Status   11/04/2018 Light growth (2+) Normal Respiratory Ejnnifer  Final     Radiology:   Liver ultrasound 11/05/2018:  IMPRESSION:  Limited evaluation of the gallbladder as it is contracted.  Grossly, no acute abnormalities.  This report was finalized on 11/05/2018 11:07 by Dr. Maida Murrieta MD.    CT scan of the chest dated 11/03/2018:  IMPRESSION:  1. Diffuse nodular opacities in bilateral lungs are likely due to  disseminated granulomatous infection. Miliary TB should be considered.  Hematogenous spread of malignancy is another consideration.  2. Lingular consolidation may represent focal pneumonia or atelectasis.  3. Mediastinal and hilar lymphadenopathy could be neoplastic or  reactive.  This report was finalized on 11/03/2018 20:57 by Dr. Vidal Maher MD.    Additional Studies Reviewed:   2Dechocardiogram  11/3/18:  · Left ventricular systolic function is normal.  LIMITED VIEWS BUT OTHERWISE NORMAL STUDY    Impression:   Acute hypoxic respiratory failure-suspect COPD with exacerbation.  Feel much of her rapid improvement related to steroid therapy and inhaler treatment.  Diffuse small nodular interstitial infiltrate.  Definite etiology uncertain.  Extensive workup to date negative.    Recommendations:   Taper steroids per pulmonary and general medicine  Will transition antibiotic treatment to oral therapy  Suggest Omnicef and azithromycin  Repeat CMP to assess liver assessment tests and alkaline phosphatase  We will discuss with pulmonary-would biopsy of a mediastinal node via consideration if respiratory status continues to improve she can tolerate bronchoscopy?  Continue to follow    De Altman MD

## 2018-11-08 VITALS
RESPIRATION RATE: 18 BRPM | WEIGHT: 114.2 LBS | OXYGEN SATURATION: 96 % | DIASTOLIC BLOOD PRESSURE: 71 MMHG | HEART RATE: 103 BPM | SYSTOLIC BLOOD PRESSURE: 124 MMHG | TEMPERATURE: 97.9 F | BODY MASS INDEX: 19.03 KG/M2 | HEIGHT: 65 IN

## 2018-11-08 LAB
ALBUMIN SERPL-MCNC: 3.6 G/DL (ref 3.5–5)
ALBUMIN/GLOB SERPL: 1.3 G/DL (ref 1.1–2.5)
ALP SERPL-CCNC: 372 U/L (ref 24–120)
ALT SERPL W P-5'-P-CCNC: 98 U/L (ref 0–54)
ANION GAP SERPL CALCULATED.3IONS-SCNC: 13 MMOL/L (ref 4–13)
AST SERPL-CCNC: 23 U/L (ref 7–45)
BACTERIA SPEC AEROBE CULT: NORMAL
BILIRUB SERPL-MCNC: 0.2 MG/DL (ref 0.1–1)
BUN BLD-MCNC: 20 MG/DL (ref 5–21)
BUN/CREAT SERPL: 39.2 (ref 7–25)
CALCIUM SPEC-SCNC: 9.4 MG/DL (ref 8.4–10.4)
CHLORIDE SERPL-SCNC: 98 MMOL/L (ref 98–110)
CO2 SERPL-SCNC: 27 MMOL/L (ref 24–31)
CREAT BLD-MCNC: 0.51 MG/DL (ref 0.5–1.4)
DEPRECATED RDW RBC AUTO: 48.9 FL (ref 40–54)
EOSINOPHIL # BLD MANUAL: 0.26 10*3/MM3 (ref 0–0.7)
EOSINOPHIL NFR BLD MANUAL: 1 % (ref 0–4)
ERYTHROCYTE [DISTWIDTH] IN BLOOD BY AUTOMATED COUNT: 14.6 % (ref 12–15)
GFR SERPL CREATININE-BSD FRML MDRD: 122 ML/MIN/1.73
GLOBULIN UR ELPH-MCNC: 2.8 GM/DL
GLUCOSE BLD-MCNC: 137 MG/DL (ref 70–100)
HCT VFR BLD AUTO: 36.3 % (ref 37–47)
HGB BLD-MCNC: 12.2 G/DL (ref 12–16)
LYMPHOCYTES # BLD MANUAL: 5.76 10*3/MM3 (ref 0.72–4.86)
LYMPHOCYTES NFR BLD MANUAL: 22.4 % (ref 15–45)
LYMPHOCYTES NFR BLD MANUAL: 7.1 % (ref 4–12)
MCH RBC QN AUTO: 30.7 PG (ref 28–32)
MCHC RBC AUTO-ENTMCNC: 33.6 G/DL (ref 33–36)
MCV RBC AUTO: 91.2 FL (ref 82–98)
MONOCYTES # BLD AUTO: 1.83 10*3/MM3 (ref 0.19–1.3)
NEUTROPHILS # BLD AUTO: 15.74 10*3/MM3 (ref 1.87–8.4)
NEUTROPHILS NFR BLD MANUAL: 59.2 % (ref 39–78)
NEUTS BAND NFR BLD MANUAL: 2 % (ref 0–10)
PLATELET # BLD AUTO: 731 10*3/MM3 (ref 130–400)
PMV BLD AUTO: 8.9 FL (ref 6–12)
POLYCHROMASIA BLD QL SMEAR: ABNORMAL
POTASSIUM BLD-SCNC: 4.6 MMOL/L (ref 3.5–5.3)
PROT SERPL-MCNC: 6.4 G/DL (ref 6.3–8.7)
QUANTIFERON CRITERIA: ABNORMAL
QUANTIFERON MITOGEN VALUE: 0.07 IU/ML
QUANTIFERON NIL VALUE: 0.02 IU/ML
QUANTIFERON TB1 AG VALUE: 0.02 IU/ML
QUANTIFERON TB2 AG VALUE: 0.02 IU/ML
QUANTIFERON-TB GOLD PLUS: ABNORMAL
RBC # BLD AUTO: 3.98 10*6/MM3 (ref 4.2–5.4)
SMALL PLATELETS BLD QL SMEAR: ABNORMAL
SODIUM BLD-SCNC: 138 MMOL/L (ref 135–145)
VARIANT LYMPHS NFR BLD MANUAL: 8.2 % (ref 0–5)
WBC MORPH BLD: NORMAL
WBC NRBC COR # BLD: 25.71 10*3/MM3 (ref 4.8–10.8)

## 2018-11-08 PROCEDURE — 94799 UNLISTED PULMONARY SVC/PX: CPT

## 2018-11-08 PROCEDURE — 94760 N-INVAS EAR/PLS OXIMETRY 1: CPT

## 2018-11-08 PROCEDURE — 90732 PPSV23 VACC 2 YRS+ SUBQ/IM: CPT | Performed by: FAMILY MEDICINE

## 2018-11-08 PROCEDURE — 25010000002 ENOXAPARIN PER 10 MG: Performed by: INTERNAL MEDICINE

## 2018-11-08 PROCEDURE — G0008 ADMIN INFLUENZA VIRUS VAC: HCPCS | Performed by: FAMILY MEDICINE

## 2018-11-08 PROCEDURE — 85007 BL SMEAR W/DIFF WBC COUNT: CPT | Performed by: INTERNAL MEDICINE

## 2018-11-08 PROCEDURE — 63710000001 PREDNISONE PER 1 MG: Performed by: INTERNAL MEDICINE

## 2018-11-08 PROCEDURE — 80053 COMPREHEN METABOLIC PANEL: CPT | Performed by: INTERNAL MEDICINE

## 2018-11-08 PROCEDURE — 99232 SBSQ HOSP IP/OBS MODERATE 35: CPT | Performed by: INTERNAL MEDICINE

## 2018-11-08 PROCEDURE — 87206 SMEAR FLUORESCENT/ACID STAI: CPT | Performed by: INTERNAL MEDICINE

## 2018-11-08 PROCEDURE — 25010000002 INFLUENZA VAC SUBUNIT QUAD 0.5 ML SUSPENSION PREFILLED SYRINGE: Performed by: FAMILY MEDICINE

## 2018-11-08 PROCEDURE — 90661 CCIIV3 VAC ABX FR 0.5 ML IM: CPT | Performed by: FAMILY MEDICINE

## 2018-11-08 PROCEDURE — 87116 MYCOBACTERIA CULTURE: CPT | Performed by: INTERNAL MEDICINE

## 2018-11-08 PROCEDURE — 25010000002 PNEUMOCOCCAL VAC POLYVALENT PER 0.5 ML: Performed by: FAMILY MEDICINE

## 2018-11-08 PROCEDURE — G0009 ADMIN PNEUMOCOCCAL VACCINE: HCPCS | Performed by: FAMILY MEDICINE

## 2018-11-08 PROCEDURE — 85025 COMPLETE CBC W/AUTO DIFF WBC: CPT | Performed by: INTERNAL MEDICINE

## 2018-11-08 RX ORDER — PREDNISONE 20 MG/1
40 TABLET ORAL
Qty: 6 TABLET | Refills: 0 | Status: SHIPPED | OUTPATIENT
Start: 2018-11-09 | End: 2018-11-08

## 2018-11-08 RX ORDER — BUDESONIDE AND FORMOTEROL FUMARATE DIHYDRATE 160; 4.5 UG/1; UG/1
2 AEROSOL RESPIRATORY (INHALATION)
Qty: 1 INHALER | Refills: 12 | Status: SHIPPED | OUTPATIENT
Start: 2018-11-08 | End: 2018-11-08

## 2018-11-08 RX ORDER — AZITHROMYCIN 250 MG/1
TABLET, FILM COATED ORAL
Qty: 7 TABLET | Refills: 0 | Status: SHIPPED | OUTPATIENT
Start: 2018-11-09 | End: 2018-11-08

## 2018-11-08 RX ORDER — ALBUTEROL SULFATE 90 UG/1
2 AEROSOL, METERED RESPIRATORY (INHALATION) EVERY 4 HOURS PRN
Qty: 8.5 G | Refills: 0 | Status: SHIPPED | OUTPATIENT
Start: 2018-11-08 | End: 2019-09-07 | Stop reason: SDUPTHER

## 2018-11-08 RX ORDER — CEFDINIR 300 MG/1
300 CAPSULE ORAL EVERY 12 HOURS SCHEDULED
Qty: 14 CAPSULE | Refills: 0 | Status: SHIPPED | OUTPATIENT
Start: 2018-11-08 | End: 2018-11-08

## 2018-11-08 RX ORDER — PREDNISONE 20 MG/1
40 TABLET ORAL
Qty: 6 TABLET | Refills: 0 | Status: SHIPPED | OUTPATIENT
Start: 2018-11-09 | End: 2018-11-12

## 2018-11-08 RX ORDER — CEFDINIR 300 MG/1
300 CAPSULE ORAL EVERY 12 HOURS SCHEDULED
Qty: 14 CAPSULE | Refills: 0 | Status: SHIPPED | OUTPATIENT
Start: 2018-11-08 | End: 2018-11-14

## 2018-11-08 RX ORDER — AZITHROMYCIN 250 MG/1
TABLET, FILM COATED ORAL
Qty: 7 TABLET | Refills: 0 | Status: SHIPPED | OUTPATIENT
Start: 2018-11-09 | End: 2019-09-07 | Stop reason: SDUPTHER

## 2018-11-08 RX ORDER — BUDESONIDE AND FORMOTEROL FUMARATE DIHYDRATE 160; 4.5 UG/1; UG/1
2 AEROSOL RESPIRATORY (INHALATION)
Qty: 1 INHALER | Refills: 12 | Status: SHIPPED | OUTPATIENT
Start: 2018-11-08 | End: 2019-09-07 | Stop reason: SDUPTHER

## 2018-11-08 RX ADMIN — FLUTICASONE PROPIONATE 2 SPRAY: 50 SPRAY, METERED NASAL at 08:40

## 2018-11-08 RX ADMIN — IPRATROPIUM BROMIDE AND ALBUTEROL SULFATE 3 ML: 2.5; .5 SOLUTION RESPIRATORY (INHALATION) at 03:41

## 2018-11-08 RX ADMIN — FAMOTIDINE 20 MG: 20 TABLET, FILM COATED ORAL at 08:34

## 2018-11-08 RX ADMIN — BUDESONIDE AND FORMOTEROL FUMARATE DIHYDRATE 2 PUFF: 160; 4.5 AEROSOL RESPIRATORY (INHALATION) at 07:59

## 2018-11-08 RX ADMIN — Medication 3 ML: at 08:40

## 2018-11-08 RX ADMIN — PNEUMOCOCCAL VACCINE POLYVALENT 0.5 ML
25; 25; 25; 25; 25; 25; 25; 25; 25; 25; 25; 25; 25; 25; 25; 25; 25; 25; 25; 25; 25; 25; 25 INJECTION, SOLUTION INTRAMUSCULAR; SUBCUTANEOUS at 13:47

## 2018-11-08 RX ADMIN — PREDNISONE 40 MG: 20 TABLET ORAL at 08:34

## 2018-11-08 RX ADMIN — CEFDINIR 300 MG: 300 CAPSULE ORAL at 08:34

## 2018-11-08 RX ADMIN — IPRATROPIUM BROMIDE AND ALBUTEROL SULFATE 3 ML: 2.5; .5 SOLUTION RESPIRATORY (INHALATION) at 07:59

## 2018-11-08 RX ADMIN — MELOXICAM 15 MG: 7.5 TABLET ORAL at 10:05

## 2018-11-08 RX ADMIN — AZITHROMYCIN 500 MG: 250 TABLET, FILM COATED ORAL at 08:34

## 2018-11-08 RX ADMIN — ENOXAPARIN SODIUM 40 MG: 40 INJECTION SUBCUTANEOUS at 08:34

## 2018-11-08 RX ADMIN — IPRATROPIUM BROMIDE AND ALBUTEROL SULFATE 3 ML: 2.5; .5 SOLUTION RESPIRATORY (INHALATION) at 11:20

## 2018-11-08 RX ADMIN — POLYETHYLENE GLYCOL 3350 17 G: 17 POWDER, FOR SOLUTION ORAL at 08:34

## 2018-11-08 RX ADMIN — INFLUENZA A VIRUS A/SINGAPORE/GP1908/2015 IVR-180 (H1N1) ANTIGEN (MDCK CELL DERIVED, PROPIOLACTONE INACTIVATED), INFLUENZA A VIRUS A/NORTH CAROLINA/04/2016 (H3N2) HEMAGGLUTININ ANTIGEN (MDCK CELL DERIVED, PROPIOLACTONE INACTIVATED), INFLUENZA B VIRUS B/IOWA/06/2017 HEMAGGLUTININ ANTIGEN (MDCK CELL DERIVED, PROPIOLACTONE INACTIVATED), INFLUENZA B VIRUS B/SINGAPORE/INFTT-16-0610/2016 HEMAGGLUTININ ANTIGEN (MDCK CELL DERIVED, PROPIOLACTONE INACTIVATED) 0.5 ML: 15; 15; 15; 15 INJECTION, SUSPENSION INTRAMUSCULAR at 13:46

## 2018-11-08 NOTE — PROGRESS NOTES
Continued Stay Note   [place]     Patient Name: Tea Sams  MRN: [id number]  Today's Date: 11/8/2018    Admit Date: 11/3/2018          Discharge Plan     Row Name 11/08/18 1336       Plan    Plan Home    Patient/Family in Agreement with Plan yes    Final Discharge Disposition Code 01 - home or self-care    Final Note Pt is self pay and unable to afford d/c medication.  Pt was provided needed prescribed meds via [facility] covered via Case Management department.  Pt saying she will make own [facility] Appt saying she has been there before.  Pt was also provided resource packet which included information on applying for medicaid.                Discharge Codes    No documentation.       Expected Discharge Date and Time     Expected Discharge Date Expected Discharge Time    Nov 8, 2018             STANISLAW Raymundo

## 2018-11-08 NOTE — DISCHARGE SUMMARY
[facility] Medicine Services  DISCHARGE SUMMARY       Date of Admission: 11/3/2018  Date of Discharge:  11/8/2018  Primary Care Physician: Provider, No Known    Presenting Problem/History of Present Illness:  Shortness of breath    Final Discharge Diagnoses:  1) Acute hypoxic respiratory failure due to COPD with exacberation, improved  2) Sepsis due to suspected left-sided pneumonia              - Procalcitonin 0.51 --> >0.25 after 48 hours              - Flu, strep pneumo, legionella, negative  3) Diffuse, bilateral nodular opacities              - CTA per my read shows peribronchular diffuse nodular opacities.     - Differential includes autoimmune, infectious, malignancy  4) Elevated transaminases and alkaline phosphatase              - Acute hepatitis panel negative  5) Leukocytosis with neutrophil predominance due to multifactorial including pneumonia and corticosteroid  6) Tobacco dependence   7) Normocytic anemia  8) Severe protein calorie malnutrition    Consults:   Infectious Disease  Pulmonary    Procedures Performed: None    Pertinent Test Results:   Imaging Results (last 7 days)     Procedure Component Value Units Date/Time    XR Chest 1 View [[id number]] Collected:  11/07/18 1113     Updated:  11/07/18 1117    Narrative:       EXAMINATION:   XR CHEST 1 VW-  11/7/2018 11:13 AM CST     HISTORY: Bilateral infiltrates.     Frontal upright radiograph of the chest 11/7/2018 10:28 AM CST     COMPARISON: November 3, 2018.     FINDINGS:   The lungs are clear. Previous noted infiltrate in the lingula appears  resolved. The cardiomediastinal silhouette and pulmonary vascularity are  within normal limits.      The osseous structures and surrounding soft tissues demonstrate no acute  abnormality.       Impression:       1. No radiographic evidence of acute cardiopulmonary process.        This report was finalized on 11/07/2018 11:14 by Dr. Obed Villavicencio MD.    US Venous Doppler  Lower Extremity Bilateral (duplex) [[id number]] Collected:  11/05/18 1529     Updated:  11/05/18 1532    Narrative:       History: Swelling       Impression:       Impression: There is no evidence of deep venous thrombosis or  superficial thrombophlebitis of right or left lower extremities.     Comments: Bilateral lower extremity venous duplex exam was performed  using color Doppler flow, Doppler waveform analysis, and grayscale  imaging, with and without compression. There is no evidence of deep  venous thrombosis in the common femoral, superficial femoral, popliteal,  peroneal, anterior tibial, and posterior tibial veins bilaterally. No  thrombus is identified in the saphenofemoral junctions and greater  saphenous veins bilaterally.         This report was finalized on 11/05/2018 15:29 by Dr. Efren Hoang MD.    US Liver [[id number]] Collected:  11/05/18 1106     Updated:  11/05/18 1110    Narrative:       EXAMINATION: US LIVER-.     HISTORY: elevated LFT, elevated alk phos; R93.89-Abnormal findings on  diagnostic imaging of other specified body structures; R94.5-Abnormal  results of liver function studies; J96.01-Acute respiratory failure with  hypoxia; A41.9-Sepsis, unspecified organism; Z91.89-Other specified  personal risk factors, not elsewhere classified; J44.1-Chronic  obstructive pulmonary disease with (acute) exacerbation; D47.3-     COMPARISON: None     PROCEDURE: Real-time sonographic evaluation of the right upper quadrant  was performed. Multiple representative images were obtained and archived  to PACS for review.     FINDINGS:     PANCREAS: The visualized pancreas is normal. The distal body and tail  are obscured due to overlying bowel gas.      LIVER: The liver is uniform in echogenicity. The contour is smooth.  There are no lesions.      GALLBLADDER AND BILE DUCTS: The gallbladder is contracted. There are no  gallstones. There is no pericholecystic fluid. There is no intra-or  extrahepatic biliary  ductal dilatation. The common bile duct is  non-dilated and measures 4 millimeters .     RIGHT KIDNEY: The right kidney is unremarkable in appearance .       Impression:       Limited evaluation of the gallbladder as it is contracted.  Grossly, no acute abnormalities.        This report was finalized on 11/05/2018 11:07 by Dr. Maida Murrieta MD.    CT Angiogram Chest With Contrast [[id number]] Collected:  11/03/18 2053     Updated:  11/03/18 2100    Narrative:       CT ANGIOGRAM CHEST W CONTRAST- 11/3/2018 8:33 PM CDT      HISTORY: Shortness of breath      COMPARISON: None.      DOSE LENGTH PRODUCT: 259 mGy cm. Automated exposure control was also  utilized to decrease patient radiation dose.     TECHNIQUE: Helical tomographic images of the chest were obtained after  the administration of intravenous contrast following angiogram protocol.  Additionally, 3D and multiplanar reformatted images were provided.        FINDINGS:    Pulmonary arteries: There is adequate enhancement of the pulmonary  arteries to evaluate for central and segmental pulmonary emboli. There  are no filling defects within the main, lobar, segmental or visualized  subsegmental pulmonary arteries. The pulmonary arteries are within  normal limits for size.      Aorta and great vessels: The aorta is well opacified and demonstrates no  dissection or aneurysm. The great vessels are normal in appearance.     Visualized neck base: The imaged portion of the base of the neck appears  unremarkable.      Lungs: Diffuse centrilobular nodules are seen in bilateral lungs. There  is also consolidation in the lingula. The trachea and bronchial tree are  patent.      Heart: The heart is normal in size. There is no pericardial effusion.      Mediastinum and lymph nodes: Mediastinal and bilateral hilar  lymphadenopathy is present.      Skeletal and soft tissues: Degenerative changes are seen in the lower  thoracic spine.     Upper abdomen: The imaged portion of the  upper abdomen demonstrates no  acute process.        Impression:       1. Diffuse nodular opacities in bilateral lungs are likely due to  disseminated granulomatous infection. Miliary TB should be considered.  Hematogenous spread of malignancy is another consideration.  2. Lingular consolidation may represent focal pneumonia or atelectasis.  3. Mediastinal and hilar lymphadenopathy could be neoplastic or  reactive.        This report was finalized on 11/03/2018 20:57 by Dr. Vidal Maher MD.    XR Chest 2 View [[id number]] Collected:  11/03/18 1951     Updated:  11/1955    Narrative:       XR CHEST 2 VW- 11/3/2018 7:43 PM CDT     HISTORY: SOA  triage protocol      COMPARISON: 10/20/2017     FINDINGS:   Upright frontal and lateral radiographs of the chest were obtained.     There is consolidation in the lingula. Diffuse interstitial opacities  are also present. The cardiomediastinal silhouette and pulmonary  vascularity are unchanged. The osseous structures and surrounding soft  tissues demonstrate no acute abnormality.       Impression:       1. LEFT lingular consolidation most likely represents pneumonia.  2. Interstitial opacities in bilateral lungs could be due to  interstitial pneumonia or edema.  This report was finalized on 11/03/2018 19:52 by Dr. Vidal Maher MD.        Lab Results (last 7 days)     Procedure Component Value Units Date/Time    AFB Culture - Sputum, Lung [[id number]] Collected:  11/08/18 1026    Specimen:  Sputum from Lung Updated:  11/08/18 1032    CBC & Differential [[id number]] Collected:  11/08/18 0707    Specimen:  Blood Updated:  11/08/18 0757    Narrative:       The following orders were created for panel order CBC & Differential.  Procedure                               Abnormality         Status                     ---------                               -----------         ------                     Manual Differential[[id number]]          Abnormal            Final result                CBC Auto Differential[[id number]]        Abnormal            Final result                 Please view results for these tests on the individual orders.    CBC Auto Differential [[id number]]  (Abnormal) Collected:  11/08/18 0707    Specimen:  Blood Updated:  11/08/18 0757     WBC 25.71 (H) 10*3/mm3      RBC 3.98 (L) 10*6/mm3      Hemoglobin 12.2 g/dL      Hematocrit 36.3 (L) %      MCV 91.2 fL      MCH 30.7 pg      MCHC 33.6 g/dL      RDW 14.6 %      RDW-SD 48.9 fl      MPV 8.9 fL      Platelets 731 (H) 10*3/mm3     Manual Differential [[id number]]  (Abnormal) Collected:  11/08/18 0707    Specimen:  Blood Updated:  11/08/18 0757     Neutrophil % 59.2 %      Lymphocyte % 22.4 %      Monocyte % 7.1 %      Eosinophil % 1.0 %      Bands %  2.0 %      Atypical Lymphocyte % 8.2 (H) %      Neutrophils Absolute 15.74 (H) 10*3/mm3      Lymphocytes Absolute 5.76 (H) 10*3/mm3      Monocytes Absolute 1.83 (H) 10*3/mm3      Eosinophils Absolute 0.26 10*3/mm3      Polychromasia Slight/1+     WBC Morphology Normal     Platelet Estimate Increased    Comprehensive Metabolic Panel [[id number]]  (Abnormal) Collected:  11/08/18 0707    Specimen:  Blood Updated:  11/08/18 0747     Glucose 137 (H) mg/dL      BUN 20 mg/dL      Creatinine 0.51 mg/dL      Sodium 138 mmol/L      Potassium 4.6 mmol/L      Chloride 98 mmol/L      CO2 27.0 mmol/L      Calcium 9.4 mg/dL      Total Protein 6.4 g/dL      Albumin 3.60 g/dL      ALT (SGPT) 98 (H) U/L      AST (SGOT) 23 U/L      Alkaline Phosphatase 372 (H) U/L      Total Bilirubin 0.2 mg/dL      eGFR Non African Amer 122 mL/min/1.73      Globulin 2.8 gm/dL      A/G Ratio 1.3 g/dL      BUN/Creatinine Ratio 39.2 (H)     Anion Gap 13.0 mmol/L     Blood Culture - Blood, [[id number]]  (Normal) Collected:  11/03/18 2124    Specimen:  Blood from Arm, Left Updated:  11/07/18 2046     Blood Culture No growth at 4 days    Blood Culture - Blood, [[id number]]  (Normal) Collected:  11/03/18 2124    Specimen:   Blood from Arm, Left Updated:  11/07/18 2046     Blood Culture No growth at 4 days    Histoplasma Ag Ur - Urine, Clean Catch [[id number]] Collected:  11/05/18 1315    Specimen:  Urine from Urine, Clean Catch Updated:  11/07/18 1545     Histoplasma Galactomannan Ag Ur <0.5     Disclaimer: Comment     Comment: This test was developed and its performance characteristics  determined by [facility]. It has not been cleared or approved  by the Food and Drug Administration.       Narrative:       Performed at:  [address]  [id number]  : Ralph Shah MD, Phone:  [phone]    AFB Culture - Sputum, Trachea [[id number]] Collected:  11/07/18 1149    Specimen:  Sputum from Trachea Updated:  11/07/18 1441     AFB Stain No acid fast bacilli seen on direct smear      No acid fast bacilli seen on concentrated smear    TSPOT [[id number]] Collected:  11/05/18 1001    Specimen:  Blood Updated:  11/07/18 0957     TSPOTTB Negative     T-SPOT Panel A 0     T-SPOT Panel B 0     TSPOT NIL CONTROL INTERP Passed     TSPOT POS CONTROL INTERP Passed    Respiratory Panel, PCR - Swab, Nasopharynx [[id number]]  (Normal) Collected:  11/04/18 1214    Specimen:  Swab from Nasopharynx Updated:  11/06/18 1414     ADENOVIRUS, PCR Not Detected     Coronavirus 229E Not Detected     Coronavirus HKU1 Not Detected     Coronavirus NL63 Not Detected     Coronavirus OC43 Not Detected     Human Metapneumovirus Not Detected     Human Rhinovirus/Enterovirus Not Detected     Influenza B PCR Not Detected     Parainfluenza Virus 1 Not Detected     Parainfluenza Virus 2 Not Detected     Parainfluenza Virus 3 Not Detected     Parainfluenza Virus 4 Not Detected     Bordetella pertussis pcr Not Detected     Influenza A H1 2009 PCR Not Detected     Chlamydophila pneumoniae PCR Not Detected     Mycoplasma pneumo by PCR Not Detected     Influenza A PCR Not Detected     Influenza A H3 Not Detected     Influenza A H1 Not  Detected     RSV, PCR Not Detected    AFB Culture - Sputum, Trachea [[id number]] Collected:  11/06/18 0855    Specimen:  Sputum from Trachea Updated:  11/06/18 1344     AFB Stain No acid fast bacilli seen on direct smear      No acid fast bacilli seen on concentrated smear    Cryptococcal Antigen [[id number]] Collected:  11/06/18 0048    Specimen:  Blood Updated:  11/06/18 0808    Respiratory Culture - Sputum, Cough [[id number]] Collected:  11/04/18 0140    Specimen:  Sputum from Cough Updated:  11/06/18 0706     Respiratory Culture Light growth (2+) Normal Respiratory Dong     Gram Stain Result Greater than 25 WBCs seen      Moderate (3+) Mixed gram positive dong    Blastomyces Antigen - Urine, Urine, Clean Catch [[id number]] Collected:  11/06/18 0201    Specimen:  Urine from Urine, Clean Catch Updated:  11/06/18 0205    Procalcitonin [[id number]]  (Normal) Collected:  11/06/18 0032    Specimen:  Blood Updated:  11/06/18 0131     Procalcitonin <0.25 ng/mL     Narrative:       SIRS, sepsis, severe sepsis, and septic shock are categorized according to the criteria of the consensus conference of the American College of Chest Physicians/Society of Critical Care Medicine.    PCT < 0.5 ng/mL     Systemic infection (sepsis) is not likely.    PCT >0.5 and < 2.0 ng/mL Systemic infection (sepsis) is possible, but other conditions are known to elevate PCT as well.    PCT > 2.0 ng/mL     Systemic infection (sepsis) is likely, unless other causes are known.      PCT > 10.0 ng/mL    Important systemic inflammatory response, almost exclusively due to severe bacterial sepsis or septic shock.    PCT values of < 0.5 ng/mL do not exclude an infection, because localized infections (without systemic signs) may be associated with such low concentrations, or a systemic infection in its initial stages (<6 hours).  Increased PCT can occur without infection.  PCT concentrations between 0.5 and 2.0 ng/mL should be interpreted taking into  account the patients history.  It is recommended to retest PCT within 6-24 hours if any concentrations < 2.0 ng/mL are obtained.    Comprehensive Metabolic Panel [[id number]]  (Abnormal) Collected:  11/06/18 0032    Specimen:  Blood Updated:  11/06/18 0119     Glucose 219 (H) mg/dL      BUN 23 (H) mg/dL      Creatinine 0.53 mg/dL      Sodium 140 mmol/L      Potassium 4.1 mmol/L      Chloride 102 mmol/L      CO2 27.0 mmol/L      Calcium 9.4 mg/dL      Total Protein 6.2 (L) g/dL      Albumin 3.20 (L) g/dL      ALT (SGPT) 118 (H) U/L      AST (SGOT) 87 (H) U/L      Alkaline Phosphatase 482 (H) U/L      Total Bilirubin 0.2 mg/dL      eGFR Non African Amer 117 mL/min/1.73      Globulin 3.0 gm/dL      A/G Ratio 1.1 g/dL      BUN/Creatinine Ratio 43.4 (H)     Anion Gap 11.0 mmol/L     CBC (No Diff) [[id number]]  (Abnormal) Collected:  11/06/18 0032    Specimen:  Blood Updated:  11/06/18 0050     WBC 29.89 (H) 10*3/mm3      RBC 3.23 (L) 10*6/mm3      Hemoglobin 9.9 (L) g/dL      Hematocrit 29.3 (L) %      MCV 90.7 fL      MCH 30.7 pg      MCHC 33.8 g/dL      RDW 14.6 %      RDW-SD 49.1 fl      MPV 9.2 fL      Platelets 535 (H) 10*3/mm3     Vancomycin, Trough [[id number]]  (Abnormal) Collected:  11/05/18 0809    Specimen:  Blood Updated:  11/05/18 0845     Vancomycin Trough <5.00 (L) mcg/mL     Lipid Panel [[id number]]  (Abnormal) Collected:  11/05/18 0548    Specimen:  Blood Updated:  11/05/18 0708     Total Cholesterol 138 mg/dL      Triglycerides 97 mg/dL      HDL Cholesterol 27 (L) mg/dL      LDL Cholesterol  96 mg/dL      LDL/HDL Ratio 3.39    TSH [[id number]]  (Normal) Collected:  11/05/18 0548    Specimen:  Blood Updated:  11/05/18 0706     TSH 1.550 mIU/mL     Comprehensive Metabolic Panel [[id number]]  (Abnormal) Collected:  11/05/18 0548    Specimen:  Blood Updated:  11/05/18 0639     Glucose 197 (H) mg/dL      BUN 14 mg/dL      Creatinine 0.58 mg/dL      Sodium 143 mmol/L      Potassium 4.3 mmol/L      Chloride 103  mmol/L      CO2 30.0 mmol/L      Calcium 9.1 mg/dL      Total Protein 6.8 g/dL      Albumin 3.40 (L) g/dL      ALT (SGPT) 109 (H) U/L      AST (SGOT) 63 (H) U/L      Alkaline Phosphatase 593 (H) U/L      Total Bilirubin 0.2 mg/dL      eGFR Non African Amer 105 mL/min/1.73      Globulin 3.4 gm/dL      A/G Ratio 1.0 (L) g/dL      BUN/Creatinine Ratio 24.1     Anion Gap 10.0 mmol/L     CBC & Differential [[id number]] Collected:  11/05/18 0548    Specimen:  Blood Updated:  11/05/18 0632    Narrative:       The following orders were created for panel order CBC & Differential.  Procedure                               Abnormality         Status                     ---------                               -----------         ------                     CBC Auto Differential[[id number]]        Abnormal            Final result                 Please view results for these tests on the individual orders.    CBC Auto Differential [[id number]]  (Abnormal) Collected:  11/05/18 0548    Specimen:  Blood Updated:  11/05/18 0632     WBC 23.10 (H) 10*3/mm3      RBC 3.50 (L) 10*6/mm3      Hemoglobin 11.0 (L) g/dL      Hematocrit 32.9 (L) %      MCV 94.0 fL      MCH 31.4 pg      MCHC 33.4 g/dL      RDW 14.6 %      RDW-SD 50.9 fl      MPV 9.3 fL      Platelets 559 (H) 10*3/mm3      Neutrophil % 85.4 (H) %      Lymphocyte % 7.5 (L) %      Monocyte % 4.9 %      Eosinophil % 0.0 %      Basophil % 0.3 %      Immature Grans % 1.9 %      Neutrophils, Absolute 19.70 (H) 10*3/mm3      Lymphocytes, Absolute 1.74 10*3/mm3      Monocytes, Absolute 1.13 10*3/mm3      Eosinophils, Absolute 0.00 10*3/mm3      Basophils, Absolute 0.08 10*3/mm3      Immature Grans, Absolute 0.45 (H) 10*3/mm3      nRBC 0.0 /100 WBC     HIV-1 & HIV-2 Antibodies [[id number]] Collected:  11/04/18 1158    Specimen:  Blood Updated:  11/04/18 8572    Narrative:       The following orders were created for panel order HIV-1 & HIV-2 Antibodies.  Procedure                                Abnormality         Status                     ---------                               -----------         ------                     HIV-1 & HIV-2 Antibodies[[id number]]     Normal              Final result                 Please view results for these tests on the individual orders.    HIV-1 & HIV-2 Antibodies [[id number]]  (Normal) Collected:  11/04/18 1158    Specimen:  Blood Updated:  11/04/18 1358     HIV-1/ HIV-2 Negative    QuantiFERON TB Plus Client Incubated [[id number]] Collected:  11/04/18 1158    Specimen:  Blood Updated:  11/04/18 1229    Hepatitis Panel, Acute [[id number]]  (Normal) Collected:  11/03/18 1941    Specimen:  Blood Updated:  11/04/18 0921     HCV S/C Ratio 0.01     Hepatitis C Ab Negative     Hep A IgM Negative     Hep B C IgM Negative     Hepatitis B Surface Ag Negative    Procalcitonin [[id number]]  (Abnormal) Collected:  11/04/18 0311    Specimen:  Blood Updated:  11/04/18 0615     Procalcitonin 0.51 (H) ng/mL     Narrative:       SIRS, sepsis, severe sepsis, and septic shock are categorized according to the criteria of the consensus conference of the American College of Chest Physicians/Society of Critical Care Medicine.    PCT < 0.5 ng/mL     Systemic infection (sepsis) is not likely.    PCT >0.5 and < 2.0 ng/mL Systemic infection (sepsis) is possible, but other conditions are known to elevate PCT as well.    PCT > 2.0 ng/mL     Systemic infection (sepsis) is likely, unless other causes are known.      PCT > 10.0 ng/mL    Important systemic inflammatory response, almost exclusively due to severe bacterial sepsis or septic shock.    PCT values of < 0.5 ng/mL do not exclude an infection, because localized infections (without systemic signs) may be associated with such low concentrations, or a systemic infection in its initial stages (<6 hours).  Increased PCT can occur without infection.  PCT concentrations between 0.5 and 2.0 ng/mL should be interpreted taking into account the patients  history.  It is recommended to retest PCT within 6-24 hours if any concentrations < 2.0 ng/mL are obtained.    CBC & Differential [[id number]] Collected:  11/04/18 0311    Specimen:  Blood Updated:  11/04/18 0430    Narrative:       The following orders were created for panel order CBC & Differential.  Procedure                               Abnormality         Status                     ---------                               -----------         ------                     Manual Differential[[id number]]          Abnormal            Final result               CBC Auto Differential[[id number]]        Abnormal            Final result                 Please view results for these tests on the individual orders.    CBC Auto Differential [[id number]]  (Abnormal) Collected:  11/04/18 0311    Specimen:  Blood Updated:  11/04/18 0430     WBC 15.65 (H) 10*3/mm3      RBC 3.44 (L) 10*6/mm3      Hemoglobin 10.9 (L) g/dL      Hematocrit 31.7 (L) %      MCV 92.2 fL      MCH 31.7 pg      MCHC 34.4 g/dL      RDW 14.6 %      RDW-SD 49.1 fl      MPV 9.3 fL      Platelets 493 (H) 10*3/mm3     Manual Differential [[id number]]  (Abnormal) Collected:  11/04/18 0311    Specimen:  Blood Updated:  11/04/18 0430     Neutrophil % 93.1 (H) %      Lymphocyte % 2.0 (L) %      Monocyte % 3.0 (L) %      Atypical Lymphocyte % 2.0 %      Neutrophils Absolute 14.57 (H) 10*3/mm3      Lymphocytes Absolute 0.31 (L) 10*3/mm3      Monocytes Absolute 0.47 10*3/mm3      Poikilocytes Slight/1+     Toxic Granulation Large/3+     Vacuolated Neutrophils Mod/2+     Platelet Estimate Increased     Giant Platelets Slight/1+    Blood Gas, Arterial [[id number]]  (Abnormal) Collected:  11/04/18 0417    Specimen:  Arterial Blood Updated:  11/04/18 0426     Site Right Brachial     Dimitri's Test N/A     pH, Arterial 7.406 pH units      pCO2, Arterial 41.6 mm Hg      pO2, Arterial 88.6 mm Hg      HCO3, Arterial 26.1 (H) mmol/L      Base Excess, Arterial 1.3 mmol/L      O2  Saturation, Arterial 97.4 %      Temperature 37.0 C      Barometric Pressure for Blood Gas 752 mmHg      Modality Nasal Cannula     Flow Rate 4.0 lpm      Ventilator Mode NA     Collected by [id number]    Basic Metabolic Panel [[id number]]  (Abnormal) Collected:  11/04/18 0311    Specimen:  Blood Updated:  11/04/18 0420     Glucose 135 (H) mg/dL      BUN 7 mg/dL      Creatinine 0.47 (L) mg/dL      Sodium 141 mmol/L      Potassium 3.5 mmol/L      Chloride 105 mmol/L      CO2 22.0 (L) mmol/L      Calcium 7.6 (L) mg/dL      eGFR Non African Amer 134 mL/min/1.73      BUN/Creatinine Ratio 14.9     Anion Gap 14.0 (H) mmol/L     Narrative:       GFR Normal >60  Chronic Kidney Disease <60  Kidney Failure <15    Magnesium [[id number]]  (Normal) Collected:  11/04/18 0311    Specimen:  Blood Updated:  11/04/18 0420     Magnesium 1.6 mg/dL     Legionella Antigen, Urine - Urine, Urine, Clean Catch [[id number]]  (Normal) Collected:  11/04/18 0225    Specimen:  Urine from Urine, Clean Catch Updated:  11/04/18 0346     LEGIONELLA ANTIGEN, URINE Negative    S. Pneumo Ag Urine or CSF - Urine, Urine, Clean Catch [[id number]]  (Normal) Collected:  11/04/18 0225    Specimen:  Urine from Urine, Clean Catch Updated:  11/04/18 0345     Strep Pneumo Ag Negative    Urinalysis With Culture If Indicated - Urine, Clean Catch [[id number]]  (Abnormal) Collected:  11/04/18 0225    Specimen:  Urine from Urine, Clean Catch Updated:  11/04/18 0256     Color, UA Yellow     Appearance, UA Clear     pH, UA <=5.0     Specific Gravity, UA 1.011     Glucose, UA Negative     Ketones, UA Negative     Bilirubin, UA Negative     Blood, UA Small (1+) (A)     Protein, UA Negative     Leuk Esterase, UA Negative     Nitrite, UA Negative     Urobilinogen, UA 0.2 E.U./dL    Urinalysis, Microscopic Only - Urine, Clean Catch [[id number]]  (Abnormal) Collected:  11/04/18 0225    Specimen:  Urine from Urine, Clean Catch Updated:  11/04/18 0256     RBC, UA 3-5 (A) /HPF      WBC,  UA None Seen /HPF      Bacteria, UA None Seen /HPF      Squamous Epithelial Cells, UA None Seen /HPF      Hyaline Casts, UA None Seen /LPF      Methodology Automated Microscopy    Hemoglobin A1c [[id number]] Collected:  11/03/18 1941    Specimen:  Blood Updated:  11/04/18 0228     Hemoglobin A1C 5.9 %     Narrative:       Less than 6.0           Non-Diabetic Range  6.0-7.0                 ADA Therapeutic Target  Greater than 7.0        Action Suggested    Blood Gas, Arterial [[id number]]  (Abnormal) Collected:  11/04/18 0052    Specimen:  Arterial Blood Updated:  11/04/18 0100     Site Right Brachial     Dimitri's Test N/A     pH, Arterial 7.395 pH units      pCO2, Arterial 38.7 mm Hg      pO2, Arterial 75.2 (L) mm Hg      HCO3, Arterial 23.7 mmol/L      Base Excess, Arterial -1.0 (L) mmol/L      O2 Saturation, Arterial 95.5 %      Temperature 37.0 C      Barometric Pressure for Blood Gas 753 mmHg      Modality Nasal Cannula     Flow Rate 2.0 lpm      Ventilator Mode NA     Collected by [id number]    [place] Draw [[id number]] Collected:  11/03/18 1941    Specimen:  Blood Updated:  11/03/18 2046    Narrative:       The following orders were created for panel order [place] Draw.  Procedure                               Abnormality         Status                     ---------                               -----------         ------                     Light Blue Top[[id number]]                                   Final result               Green Top (Gel)[[id number]]                                  Final result               Lavender Top[[id number]]                                     Final result               Red Top[[id number]]                                          Final result                 Please view results for these tests on the individual orders.    Light Blue Top [[id number]] Collected:  11/03/18 1941    Specimen:  Blood Updated:  11/03/18 2046     Extra Tube hold for add-on     Comment: Auto resulted       Green Top  (Gel) [[id number]] Collected:  11/03/18 1941    Specimen:  Blood Updated:  11/03/18 2046     Extra Tube Hold for add-ons.     Comment: Auto resulted.       Lavender Top [[id number]] Collected:  11/03/18 1941    Specimen:  Blood Updated:  11/03/18 2046     Extra Tube hold for add-on     Comment: Auto resulted       Red Top [[id number]] Collected:  11/03/18 1941    Specimen:  Blood Updated:  11/03/18 2046     Extra Tube Hold for add-ons.     Comment: Auto resulted.       Procalcitonin [[id number]]  (Abnormal) Collected:  11/03/18 1941    Specimen:  Blood Updated:  11/03/18 2033     Procalcitonin 0.44 (H) ng/mL     Narrative:       SIRS, sepsis, severe sepsis, and septic shock are categorized according to the criteria of the consensus conference of the American College of Chest Physicians/Society of Critical Care Medicine.    PCT < 0.5 ng/mL     Systemic infection (sepsis) is not likely.    PCT >0.5 and < 2.0 ng/mL Systemic infection (sepsis) is possible, but other conditions are known to elevate PCT as well.    PCT > 2.0 ng/mL     Systemic infection (sepsis) is likely, unless other causes are known.      PCT > 10.0 ng/mL    Important systemic inflammatory response, almost exclusively due to severe bacterial sepsis or septic shock.    PCT values of < 0.5 ng/mL do not exclude an infection, because localized infections (without systemic signs) may be associated with such low concentrations, or a systemic infection in its initial stages (<6 hours).  Increased PCT can occur without infection.  PCT concentrations between 0.5 and 2.0 ng/mL should be interpreted taking into account the patients history.  It is recommended to retest PCT within 6-24 hours if any concentrations < 2.0 ng/mL are obtained.    C-reactive Protein [[id number]]  (Abnormal) Collected:  11/03/18 1941    Specimen:  Blood Updated:  11/03/18 2024     C-Reactive Protein 24.90 (H) mg/dL     CBC & Differential [[id number]] Collected:  11/03/18 1941    Specimen:   Blood Updated:  11/03/18 2019    Narrative:       The following orders were created for panel order CBC & Differential.  Procedure                               Abnormality         Status                     ---------                               -----------         ------                     Manual Differential[[id number]]          Abnormal            Final result               CBC Auto Differential[[id number]]        Abnormal            Final result                 Please view results for these tests on the individual orders.    CBC Auto Differential [[id number]]  (Abnormal) Collected:  11/03/18 1941    Specimen:  Blood Updated:  11/03/18 2019     WBC 19.33 (H) 10*3/mm3      RBC 4.10 (L) 10*6/mm3      Hemoglobin 12.7 g/dL      Hematocrit 37.8 %      MCV 92.2 fL      MCH 31.0 pg      MCHC 33.6 g/dL      RDW 14.2 %      RDW-SD 48.2 fl      MPV 9.1 fL      Platelets 551 (H) 10*3/mm3     Narrative:       The previously reported component NRBC is no longer being reported.    Manual Differential [[id number]]  (Abnormal) Collected:  11/03/18 1941    Specimen:  Blood Updated:  11/03/18 2019     Neutrophil % 66.3 %      Lymphocyte % 11.9 (L) %      Monocyte % 13.9 (H) %      Eosinophil % 3.0 %      Basophil % 1.0 %      Bands %  2.0 %      Myelocyte % 1.0 (H) %      Atypical Lymphocyte % 1.0 %      Neutrophils Absolute 13.21 (H) 10*3/mm3      Lymphocytes Absolute 2.30 10*3/mm3      Monocytes Absolute 2.69 (H) 10*3/mm3      Eosinophils Absolute 0.58 10*3/mm3      Basophils Absolute 0.19 10*3/mm3      Polychromasia Slight/1+     Target Cells Slight/1+     Toxic Granulation Mod/2+     Vacuolated Neutrophils Mod/2+     Platelet Estimate Increased    BNP [[id number]]  (Normal) Collected:  11/03/18 1941    Specimen:  Blood Updated:  11/03/18 2016     proBNP 137.0 pg/mL     Troponin [[id number]]  (Normal) Collected:  11/03/18 1941    Specimen:  Blood Updated:  11/03/18 2016     Troponin I <0.012 ng/mL     Influenza Antigen, Rapid -  Swab, Nasopharynx [[id number]]  (Normal) Collected:  11/03/18 1958    Specimen:  Swab from Nasopharynx Updated:  11/03/18 2015     Influenza A Ag, EIA Negative     Influenza B Ag, EIA Negative    Narrative:         Recommend confirmation of negative results by viral culture or molecular assay.    Comprehensive Metabolic Panel [[id number]]  (Abnormal) Collected:  11/03/18 1941    Specimen:  Blood Updated:  11/03/18 2008     Glucose 144 (H) mg/dL      BUN 10 mg/dL      Creatinine 0.59 mg/dL      Sodium 139 mmol/L      Potassium 4.0 mmol/L      Chloride 98 mmol/L      CO2 26.0 mmol/L      Calcium 9.4 mg/dL      Total Protein 7.8 g/dL      Albumin 3.90 g/dL      ALT (SGPT) 187 (H) U/L      AST (SGOT) 179 (H) U/L      Alkaline Phosphatase 946 (H) U/L      Total Bilirubin 0.6 mg/dL      eGFR Non African Amer 103 mL/min/1.73      Globulin 3.9 gm/dL      A/G Ratio 1.0 (L) g/dL      BUN/Creatinine Ratio 16.9     Anion Gap 15.0 (H) mmol/L     Blood Gas, Arterial [[id number]]  (Abnormal) Collected:  11/03/18 1950    Specimen:  Arterial Blood Updated:  11/03/18 2008     Site Right Brachial     Dimitri's Test N/A     pH, Arterial 7.474 (H) pH units      pCO2, Arterial 33.3 (L) mm Hg      pO2, Arterial 52.7 (C) mm Hg      HCO3, Arterial 24.5 mmol/L      Base Excess, Arterial 1.3 mmol/L      O2 Saturation, Arterial 90.5 (L) %      Temperature 37.0 C      Barometric Pressure for Blood Gas 754 mmHg      Modality Room Air     Ventilator Mode NA     Notified Who CHERYL OTERO     Notified By [id number]     Notified Time 11/03/2018 20:08     Collected by [id number]    D-dimer, Quantitative [[id number]]  (Abnormal) Collected:  11/03/18 1941    Specimen:  Blood Updated:  11/03/18 2006     D-Dimer, Quantitative 2.14 (H) mg/L (FEU)     Narrative:       Reference Range is 0-0.50 mg/L FEU. However, results <0.50 mg/L FEU tends to rule out DVT or PE. Results >0.50 mg/L FEU are not useful in predicting absence or presence of DVT or PE.    Lactic Acid,  "Plasma [[id number]]  (Normal) Collected:  11/03/18 1941    Specimen:  Blood Updated:  11/03/18 2004     Lactate 1.5 mmol/L         Hospital Course:  The patient is a 63 y.o. female who presented to [facility] with shortness of breath.  Patient was found to diffuse bilateral nodular pattern on her CT that was not previously present.  Patient had risk factors for TB and was placed in isolation.  Patient was initially treated broadly with azithromycin, cefepime and vancomycin.  Low suspicion for MRSA, so taken off the vancomycin and continue azithromycin and cefepime.  ID was consulted, initially treated broadly, but de-escalated down to omnicef and azithromycin for an additional week with a taper.      Patient was initially in respiratory failure requiring O2, however, over the course of the first few days of therapy was titrated down to room air and did not need O2 at the time of discharge.    T spot was negative.  Three morning AFB stains were negative, cultures pending.  Quantiferon was indeterminate.      Pulmonary was consulted and reviewed images.  They will see in follow-up for CT of the lungs and consideration of further work-up.    See complete work-up in EHR for details.         Physical Exam on Discharge:  /81   Pulse 98   Temp 97.8 °F (36.6 °C)   Resp 20   Ht 165.1 cm (65\")   Wt 51.8 kg (114 lb 3.2 oz)   LMP  (Within Years)   SpO2 98%   BMI 19.00 kg/m²   Physical Exam   Constitutional: She is oriented to person, place, and time. She appears well-developed. No distress.   HENT:   Head: Normocephalic and atraumatic.   Eyes: Conjunctivae are normal. No scleral icterus.   Cardiovascular: Normal rate, regular rhythm and normal heart sounds. Exam reveals no gallop and no friction rub.   No murmur heard.  Pulmonary/Chest: Effort normal. No respiratory distress. She has wheezes. She has rales.   Abdominal: Soft. Bowel sounds are normal. She exhibits no distension and no mass. There is no " tenderness. There is no rebound and no guarding. No hernia.   Musculoskeletal: She exhibits no edema.   Neurological: She is alert and oriented to person, place, and time. No cranial nerve deficit.   Skin: Skin is warm and dry. She is not diaphoretic.   Psychiatric: She has a normal mood and affect. Her behavior is normal.   Nursing note and vitals reviewed.      Condition on Discharge: Stable    Discharge Disposition:  Home or Self Care    Discharge Medications:     Discharge Medications      New Medications      Instructions Start Date   albuterol 108 (90 Base) MCG/ACT inhaler  Commonly known as:  PROVENTIL HFA;VENTOLIN HFA   2 puffs, Inhalation, Every 4 Hours PRN      azithromycin 250 MG tablet  Commonly known as:  ZITHROMAX   Take 1 tablet daily      budesonide-formoterol 160-4.5 MCG/ACT inhaler  Commonly known as:  SYMBICORT   2 puffs, Inhalation, 2 Times Daily - RT      cefdinir 300 MG capsule  Commonly known as:  OMNICEF   300 mg, Oral, Every 12 Hours Scheduled      Influenza Vac Subunit Quad 0.5 ML suspension prefilled syringe injection  Commonly known as:  FLUCELVAX   0.5 mL, Intramuscular, Once      predniSONE 20 MG tablet  Commonly known as:  DELTASONE   40 mg, Oral, Daily With Breakfast           Discharge Diet: Regulatr    Activity at Discharge: As tolerated     Follow-up Appointments:   Future Appointments   Date Time Provider Department Center   11/29/2018 11:30 AM PAD B[facility] CT 1 BH PAD CT BI PAD   12/7/2018 10:15 AM Richard Dickerson APRN [facility] PAD None       Test Results Pending at Discharge: None     Matias Snell MD  11/08/18  11:24 AM    Time: 35 minutes.

## 2018-11-08 NOTE — PLAN OF CARE
Problem: Patient Care Overview  Goal: Plan of Care Review  Outcome: Ongoing (interventions implemented as appropriate)   11/08/18 0326   Coping/Psychosocial   Plan of Care Reviewed With patient   Plan of Care Review   Progress improving   OTHER   Outcome Summary Patient is currently on room air. No complaints of SOA. Patient complains of arthritic pain. Tylenol given per orders. Patient is receiving Mobile daily PRN for arthritic pain. Will continue to monitor.       Problem: Fall Risk (Adult)  Goal: Identify Related Risk Factors and Signs and Symptoms  Outcome: Ongoing (interventions implemented as appropriate)    Goal: Absence of Fall  Outcome: Ongoing (interventions implemented as appropriate)      Problem: ARDS (Acute Resp Distress Syndrome) (Adult)  Goal: Signs and Symptoms of Listed Potential Problems Will be Absent, Minimized or Managed (ARDS)  Outcome: Ongoing (interventions implemented as appropriate)

## 2018-11-08 NOTE — PLAN OF CARE
"Problem: Patient Care Overview  Goal: Plan of Care Review  Outcome: Ongoing (interventions implemented as appropriate)   11/08/18 1129   Coping/Psychosocial   Plan of Care Reviewed With patient   Plan of Care Review   Progress improving   OTHER   Outcome Summary Pt reports a much improved appetite and that she will \"eat anything set infront of me.\" She agreed to keep drinking boost post d/c as long as she can afford them. Pt stated her goal weight is 130# . Will continue to monitor.          "

## 2018-11-08 NOTE — PROGRESS NOTES
Continued Stay Note  CARROLL Abarca     Patient Name: Tea Sams  MRN: [id number]  Today's Date: 11/8/2018    Admit Date: 11/3/2018          Discharge Plan     Row Name 11/08/18 1009       Plan    Plan Home    Patient/Family in Agreement with Plan yes    Plan Comments Per MD pt will need follow-up appt. with MD and assistance with getting medications at d/c as pt has no insurance. Will await d/c status to see what assistance can be provided.  Will follow.               Discharge Codes    No documentation.           STANISLAW Raymundo

## 2018-11-08 NOTE — PROGRESS NOTES
[facility] Pulmonary Progress Note    Patient: Tea Sams  1955   MR# [id number]   Acct# [id number]  11/08/18   9:32 AM  Referring Provider: Matias Snell MD      Problem list:   Patient Active Problem List   Diagnosis   • Sepsis (CMS/HCC)        Chief Complaint: Dyspnea, cough, abnormal lung imaging    Interval history: She remains on room air.  She continues to have cough and it has been more productive this morning.  So far all labs have come back negative including histo, AFB and T spot.  QuantiFERON Gold is still pending.   Liver enzymes are trending down.  Yesterday's chest x-ray looks much improved.  Case discussed with Dr. Snell.     HPI    Allergy: No Known Allergies    Meds:      azithromycin 500 mg Oral Q24H   budesonide-formoterol 2 puff Inhalation BID - RT   cefdinir 300 mg Oral Q12H   enoxaparin 40 mg Subcutaneous Q24H   famotidine 20 mg Oral BID   fluticasone 2 spray Each Nare Daily   ipratropium-albuterol 3 mL Nebulization Q4H - RT   polyethylene glycol 17 g Oral Daily   predniSONE 40 mg Oral Daily With Breakfast   sodium chloride 3 mL Intravenous Q12H       Pharmacy Consult - Pharmacy to dose        Review of Systems:   Review of Systems   Constitutional: Positive for fatigue.   HENT:        Fullness to the right ear   Respiratory: Positive for cough and shortness of breath (Improving).    Cardiovascular: Positive for chest pain.   Gastrointestinal: Negative.    Neurological: Positive for weakness (Tires easily).       Physical Exam:  Vitals:    11/08/18 0806   BP:    Pulse: 84   Resp: 20   Temp:    SpO2:        Intake/Output Summary (Last 24 hours) at 11/08/18 0932  Last data filed at 11/07/18 1844   Gross per 24 hour   Intake              240 ml   Output                0 ml   Net              240 ml       Physical Exam  Constitutional: She is oriented to person, place, and time. She appears well-developed and well-nourished.  She is in no acute distress  She is on room air.  HENT:    Head: Normocephalic and atraumatic.   Eyes: Pupils are equal, round, and reactive to light. No scleral icterus.   Ears: Purulent effusion on the right TM, minimal effusion on the left TM.   Neck: Normal range of motion. Neck supple.   Cardiovascular: Tachycardic rate and regular rhythm.    Pulmonary/Chest: She has occasional cough.  She has few expiratory wheezes.   Abdominal: Soft. Bowel sounds are normal. She exhibits no distension.  Musculoskeletal: Normal range of motion. She exhibits no edema.   Neurological: She is alert and oriented to person, place, and time.   Skin: Skin is warm and dry. There is some pallor.   Psychiatric: She has a normal mood and affect.   Nursing note and vitals reviewed.    Data:     Results from last 7 days  Lab Units 11/08/18  0707 11/06/18  0032 11/05/18  0548   WBC 10*3/mm3 25.71* 29.89* 23.10*   HEMOGLOBIN g/dL 12.2 9.9* 11.0*   PLATELETS 10*3/mm3 731* 535* 559*         Results from last 7 days  Lab Units 11/08/18  0707 11/06/18  0032 11/05/18  0548   SODIUM mmol/L 138 140 143   POTASSIUM mmol/L 4.6 4.1 4.3   BUN mg/dL 20 23* 14   CREATININE mg/dL 0.51 0.53 0.58         Results from last 7 days  Lab Units 11/04/18  0417 11/04/18  0052 11/03/18  1950   PH, ARTERIAL pH units 7.406 7.395 7.474*   PCO2, ARTERIAL mm Hg 41.6 38.7 33.3*   PO2 ART mm Hg 88.6 75.2* 52.7*         Radiology:  Imaging Results (last 24 hours)     Procedure Component Value Units Date/Time    XR Chest 1 View [[id number]] Collected:  11/07/18 1113     Updated:  11/07/18 1117    Narrative:       EXAMINATION:   XR CHEST 1 VW-  11/7/2018 11:13 AM CST     HISTORY: Bilateral infiltrates.     Frontal upright radiograph of the chest 11/7/2018 10:28 AM CST     COMPARISON: November 3, 2018.     FINDINGS:   The lungs are clear. Previous noted infiltrate in the lingula appears  resolved. The cardiomediastinal silhouette and pulmonary vascularity are  within normal limits.      The osseous structures and surrounding soft  tissues demonstrate no acute  abnormality.       Impression:       1. No radiographic evidence of acute cardiopulmonary process.        This report was finalized on 11/07/2018 11:14 by Dr. Obed Villavicencio MD.            Pulmonary Assessment:    1. Acute hypoxemic respiratory distress  2. Probable pneumonia  3. Positive sepsis screen initially  4. Pleuritic type chest pain  5. Diffuse lung nodules bilaterally, rule out tuberculosis  6. COPD, she may very well have underlying asthma as well  7. Leukocytosis, worsening  8. Elevated liver enzymes, hepatitis panel negative  9. Tobacco abuse  10. Otitis media with effusion, right side      Plan:   · So far, multiple lab studies have come back negative  · Infectious disease is following   · Flonase nasal spray twice a day  · She has been on room air for the last couple days with no need for supplemental oxygen  · Continue breathing treatments and Symbicort  · She is now off of IV antibiotics and on oral azithromycin and Omnicef   · Continue prednisone taper  · She should be ready for discharge today or tomorrow  · We recommend she get follow-up CT in about 3 weeks and then see us in the office.  If the CT is improved, there will be no need for further workup such as EBUS. However, if scan or the patient is not clinically improving, we will set her for outpatient EBUS.  This was discussed with both Dr. Snell and Dr. Denis.  · Orders for office follow-up and follow-up CT placed in Epic.    Electronically signed by HORTENCIA Thibodeaux, 11/08/18, 9:32 AM   Physician substantive contribution:  Pertinent symptoms/interval history include: The patient is much improved clinically since admission.  Respiratory exam shows pertinent findings of just a few minimal wheezes on chest exam.  Plan includes: I agree with plans for discharge.  We will get a follow-up chest CT in a few weeks and if it shows improvement there will be no need for invasive workup.  If she had  persistent and particularly not improving adenopathy she would be a potential candidate for bronchoscopy with endobronchial ultrasound.  I have seen and examined patient personally, performing a face-to-face diagnostic evaluation with plan of care reviewed and developed with APRN and nursing staff. I have addended and/or modified the above history of present illness, physical examination, and assessment and plan to reflect my findings and impressions. Essential elements of the care plan were discussed with APRN above.  Agree with findings and assessment/plan as documented above.    Electronically signed by Melchor Denis MD, on 11/8/2018, 12:59 PM

## 2018-11-09 ENCOUNTER — READMISSION MANAGEMENT (OUTPATIENT)
Dept: CALL CENTER | Facility: HOSPITAL | Age: 63
End: 2018-11-09

## 2018-11-09 LAB
CRYPTOC AG SER QL IA.RAPID: NEGATIVE
MVISTA(R) BLASTOMYCES AG: NORMAL NG/ML
SPECIMEN SOURCE: NORMAL

## 2018-11-10 LAB — BACTERIA SPEC AEROBE CULT: NORMAL

## 2018-11-10 NOTE — OUTREACH NOTE
Prep Survey      Responses   Facility patient discharged from?  [facility]   Is patient eligible?  Yes   Discharge diagnosis  Sepsis   Does the patient have one of the following disease processes/diagnoses(primary or secondary)?  Sepsis   Does the patient have Home health ordered?  No   Is there a DME ordered?  No   Prep survey completed?  Yes          Kori Butt RN

## 2018-11-12 ENCOUNTER — READMISSION MANAGEMENT (OUTPATIENT)
Dept: CALL CENTER | Facility: HOSPITAL | Age: 63
End: 2018-11-12

## 2018-11-12 NOTE — OUTREACH NOTE
Sepsis Week 1 Survey      Responses   Facility patient discharged from?  [facility]   Does the patient have one of the following disease processes/diagnoses(primary or secondary)?  Sepsis   Is there a successful TCM telephone encounter documented?  No   Week 1 attempt successful?  No   Unsuccessful attempts  Attempt 1          Cl Karimi RN

## 2018-11-14 ENCOUNTER — READMISSION MANAGEMENT (OUTPATIENT)
Dept: CALL CENTER | Facility: HOSPITAL | Age: 63
End: 2018-11-14

## 2018-11-14 NOTE — OUTREACH NOTE
Sepsis Week 1 Survey      Responses   Facility patient discharged from?  [facility]   Does the patient have one of the following disease processes/diagnoses(primary or secondary)?  Sepsis   Is there a successful TCM telephone encounter documented?  No   Week 1 attempt successful?  No   Unsuccessful attempts  Attempt 2          Emily Vaughn RN

## 2018-11-19 ENCOUNTER — READMISSION MANAGEMENT (OUTPATIENT)
Dept: CALL CENTER | Facility: HOSPITAL | Age: 63
End: 2018-11-19

## 2018-11-19 NOTE — OUTREACH NOTE
Sepsis Week 2 Survey      Responses   Facility patient discharged from?  [place]   Does the patient have one of the following disease processes/diagnoses(primary or secondary)?  Sepsis   Week 2 attempt successful?  No   Unsuccessful attempts  Attempt 1          Danae Lopez RN

## 2018-11-20 ENCOUNTER — READMISSION MANAGEMENT (OUTPATIENT)
Dept: CALL CENTER | Facility: HOSPITAL | Age: 63
End: 2018-11-20

## 2018-11-21 NOTE — OUTREACH NOTE
Sepsis Week 2 Survey      Responses   Facility patient discharged from?  [facility]   Does the patient have one of the following disease processes/diagnoses(primary or secondary)?  Sepsis   Week 2 attempt successful?  Yes   Call start time  1805   Call end time  1807   Is patient permission given to speak with other caregiver?  No   Meds reviewed with patient/caregiver?  Yes   Is the patient taking all medications as directed (includes completed medication regime)?  Yes   Medication comments  has finished antibiotics and steroids   Comments regarding appointments  I saw Dr. Arita11/16   Does the patient have a primary care provider?   Yes   Has the patient kept scheduled appointments due by today?  Yes   What is the patient's perception of their health status since discharge?  Improving   Week 2 call completed?  Yes          Brie Palomo RN

## 2018-11-28 ENCOUNTER — READMISSION MANAGEMENT (OUTPATIENT)
Dept: CALL CENTER | Facility: HOSPITAL | Age: 63
End: 2018-11-28

## 2018-11-28 NOTE — OUTREACH NOTE
Sepsis Week 3 Survey      Responses   Facility patient discharged from?  [place]   Does the patient have one of the following disease processes/diagnoses(primary or secondary)?  Sepsis   Week 3 attempt successful?  No   Unsuccessful attempts  Attempt 1          Marcelina Barnes RN

## 2018-11-29 ENCOUNTER — HOSPITAL ENCOUNTER (OUTPATIENT)
Dept: CT IMAGING | Facility: HOSPITAL | Age: 63
Discharge: HOME | End: 2018-11-29
Admitting: NURSE PRACTITIONER

## 2018-11-29 ENCOUNTER — READMISSION MANAGEMENT (OUTPATIENT)
Dept: CALL CENTER | Facility: HOSPITAL | Age: 63
End: 2018-11-29

## 2018-11-29 DIAGNOSIS — R93.89 ABNORMAL CT OF THE CHEST: ICD-10-CM

## 2018-11-29 DIAGNOSIS — Z91.89 TUBERCULOSIS HIGH RISK: ICD-10-CM

## 2018-11-29 PROCEDURE — 71250 CT THORAX DX C-: CPT

## 2018-12-18 LAB
MYCOBACTERIUM SPEC CULT: ABNORMAL
MYCOBACTERIUM SPEC CULT: NORMAL
NIGHT BLUE STAIN TISS: ABNORMAL
NIGHT BLUE STAIN TISS: ABNORMAL
NIGHT BLUE STAIN TISS: NORMAL
NIGHT BLUE STAIN TISS: NORMAL

## 2018-12-20 LAB
MYCOBACTERIUM SPEC CULT: NORMAL
NIGHT BLUE STAIN TISS: NORMAL
NIGHT BLUE STAIN TISS: NORMAL

## 2019-03-21 ENCOUNTER — HOSPITAL ENCOUNTER (EMERGENCY)
Facility: HOSPITAL | Age: 64
Discharge: HOME | End: 2019-03-21
Attending: EMERGENCY MEDICINE | Admitting: EMERGENCY MEDICINE

## 2019-03-21 ENCOUNTER — APPOINTMENT (OUTPATIENT)
Dept: GENERAL RADIOLOGY | Facility: HOSPITAL | Age: 64
End: 2019-03-21

## 2019-03-21 VITALS
BODY MASS INDEX: 17.99 KG/M2 | OXYGEN SATURATION: 96 % | HEIGHT: 65 IN | HEART RATE: 95 BPM | RESPIRATION RATE: 18 BRPM | DIASTOLIC BLOOD PRESSURE: 86 MMHG | WEIGHT: 108 LBS | SYSTOLIC BLOOD PRESSURE: 138 MMHG | TEMPERATURE: 98.3 F

## 2019-03-21 DIAGNOSIS — J44.1 COPD EXACERBATION (HCC): Primary | ICD-10-CM

## 2019-03-21 LAB
ALBUMIN SERPL-MCNC: 5 G/DL (ref 3.5–5)
ALBUMIN/GLOB SERPL: 1.9 G/DL (ref 1.1–2.5)
ALP SERPL-CCNC: 68 U/L (ref 24–120)
ALT SERPL W P-5'-P-CCNC: 18 U/L (ref 0–54)
ANION GAP SERPL CALCULATED.3IONS-SCNC: 12 MMOL/L (ref 4–13)
AST SERPL-CCNC: 31 U/L (ref 7–45)
ATMOSPHERIC PRESS: 752 MMHG
BASE EXCESS BLDV CALC-SCNC: 1.2 MMOL/L (ref 0–2)
BASOPHILS # BLD AUTO: 0.06 10*3/MM3 (ref 0–0.2)
BASOPHILS NFR BLD AUTO: 0.4 % (ref 0–2)
BDY SITE: ABNORMAL
BILIRUB SERPL-MCNC: 0.4 MG/DL (ref 0.1–1)
BODY TEMPERATURE: 37 C
BUN BLD-MCNC: 26 MG/DL (ref 5–21)
BUN/CREAT SERPL: 50 (ref 7–25)
CALCIUM SPEC-SCNC: 9.6 MG/DL (ref 8.4–10.4)
CHLORIDE SERPL-SCNC: 102 MMOL/L (ref 98–110)
CO2 SERPL-SCNC: 26 MMOL/L (ref 24–31)
CREAT BLD-MCNC: 0.52 MG/DL (ref 0.5–1.4)
DEPRECATED RDW RBC AUTO: 44 FL (ref 40–54)
EOSINOPHIL # BLD AUTO: 0 10*3/MM3 (ref 0–0.7)
EOSINOPHIL NFR BLD AUTO: 0 % (ref 0–4)
ERYTHROCYTE [DISTWIDTH] IN BLOOD BY AUTOMATED COUNT: 13.6 % (ref 12–15)
GFR SERPL CREATININE-BSD FRML MDRD: 119 ML/MIN/1.73
GLOBULIN UR ELPH-MCNC: 2.7 GM/DL
GLUCOSE BLD-MCNC: 129 MG/DL (ref 70–100)
HCO3 BLDV-SCNC: 26.3 MMOL/L (ref 22–28)
HCT VFR BLD AUTO: 36.3 % (ref 37–47)
HGB BLD-MCNC: 12.6 G/DL (ref 12–16)
HOLD SPECIMEN: NORMAL
HOLD SPECIMEN: NORMAL
IMM GRANULOCYTES # BLD AUTO: 0.08 10*3/MM3 (ref 0–0.05)
IMM GRANULOCYTES NFR BLD AUTO: 0.5 % (ref 0–5)
LYMPHOCYTES # BLD AUTO: 1.18 10*3/MM3 (ref 0.72–4.86)
LYMPHOCYTES NFR BLD AUTO: 7 % (ref 15–45)
Lab: ABNORMAL
MCH RBC QN AUTO: 30.7 PG (ref 28–32)
MCHC RBC AUTO-ENTMCNC: 34.7 G/DL (ref 33–36)
MCV RBC AUTO: 88.3 FL (ref 82–98)
MODALITY: ABNORMAL
MONOCYTES # BLD AUTO: 0.81 10*3/MM3 (ref 0.19–1.3)
MONOCYTES NFR BLD AUTO: 4.8 % (ref 4–12)
NEUTROPHILS # BLD AUTO: 14.69 10*3/MM3 (ref 1.87–8.4)
NEUTROPHILS NFR BLD AUTO: 87.3 % (ref 39–78)
NRBC BLD AUTO-RTO: 0 /100 WBC (ref 0–0)
PCO2 BLDV: 42.3 MM HG (ref 41–51)
PH BLDV: 7.4 PH UNITS (ref 7.32–7.42)
PLATELET # BLD AUTO: 498 10*3/MM3 (ref 130–400)
PMV BLD AUTO: 9 FL (ref 6–12)
PO2 BLDV: 52.5 MM HG (ref 27–53)
POTASSIUM BLD-SCNC: 4.5 MMOL/L (ref 3.5–5.3)
PROT SERPL-MCNC: 7.7 G/DL (ref 6.3–8.7)
RBC # BLD AUTO: 4.11 10*6/MM3 (ref 4.2–5.4)
SAO2 % BLDCOV: 86.6 % (ref 45–75)
SODIUM BLD-SCNC: 140 MMOL/L (ref 135–145)
TROPONIN I SERPL-MCNC: <0.012 NG/ML (ref 0–0.03)
VENTILATOR MODE: ABNORMAL
WBC NRBC COR # BLD: 16.82 10*3/MM3 (ref 4.8–10.8)
WHOLE BLOOD HOLD SPECIMEN: NORMAL
WHOLE BLOOD HOLD SPECIMEN: NORMAL

## 2019-03-21 PROCEDURE — 85025 COMPLETE CBC W/AUTO DIFF WBC: CPT | Performed by: EMERGENCY MEDICINE

## 2019-03-21 PROCEDURE — 94799 UNLISTED PULMONARY SVC/PX: CPT

## 2019-03-21 PROCEDURE — 94640 AIRWAY INHALATION TREATMENT: CPT

## 2019-03-21 PROCEDURE — 80053 COMPREHEN METABOLIC PANEL: CPT | Performed by: EMERGENCY MEDICINE

## 2019-03-21 PROCEDURE — 99284 EMERGENCY DEPT VISIT MOD MDM: CPT

## 2019-03-21 PROCEDURE — 94644 CONT INHLJ TX 1ST HOUR: CPT

## 2019-03-21 PROCEDURE — 71046 X-RAY EXAM CHEST 2 VIEWS: CPT

## 2019-03-21 PROCEDURE — 63710000001 PREDNISONE PER 1 MG: Performed by: EMERGENCY MEDICINE

## 2019-03-21 PROCEDURE — 82803 BLOOD GASES ANY COMBINATION: CPT

## 2019-03-21 PROCEDURE — 93005 ELECTROCARDIOGRAM TRACING: CPT | Performed by: EMERGENCY MEDICINE

## 2019-03-21 PROCEDURE — 84484 ASSAY OF TROPONIN QUANT: CPT | Performed by: EMERGENCY MEDICINE

## 2019-03-21 PROCEDURE — 93010 ELECTROCARDIOGRAM REPORT: CPT | Performed by: INTERNAL MEDICINE

## 2019-03-21 RX ORDER — SODIUM CHLORIDE 0.9 % (FLUSH) 0.9 %
10 SYRINGE (ML) INJECTION AS NEEDED
Status: DISCONTINUED | OUTPATIENT
Start: 2019-03-21 | End: 2019-03-21 | Stop reason: HOSPADM

## 2019-03-21 RX ORDER — PREDNISONE 50 MG/1
50 TABLET ORAL DAILY
Qty: 4 TABLET | Refills: 0 | Status: SHIPPED | OUTPATIENT
Start: 2019-03-21 | End: 2019-03-25

## 2019-03-21 RX ORDER — IPRATROPIUM BROMIDE AND ALBUTEROL SULFATE 2.5; .5 MG/3ML; MG/3ML
3 SOLUTION RESPIRATORY (INHALATION) ONCE
Status: COMPLETED | OUTPATIENT
Start: 2019-03-21 | End: 2019-03-21

## 2019-03-21 RX ORDER — PREDNISONE 20 MG/1
60 TABLET ORAL ONCE
Status: COMPLETED | OUTPATIENT
Start: 2019-03-21 | End: 2019-03-21

## 2019-03-21 RX ORDER — ALBUTEROL SULFATE 2.5 MG/3ML
2.5 SOLUTION RESPIRATORY (INHALATION)
Status: COMPLETED | OUTPATIENT
Start: 2019-03-21 | End: 2019-03-21

## 2019-03-21 RX ADMIN — ALBUTEROL SULFATE 2.5 MG: 2.5 SOLUTION RESPIRATORY (INHALATION) at 11:28

## 2019-03-21 RX ADMIN — ALBUTEROL SULFATE 2.5 MG: 2.5 SOLUTION RESPIRATORY (INHALATION) at 11:29

## 2019-03-21 RX ADMIN — PREDNISONE 60 MG: 20 TABLET ORAL at 11:18

## 2019-03-21 RX ADMIN — IPRATROPIUM BROMIDE AND ALBUTEROL SULFATE 3 ML: 2.5; .5 SOLUTION RESPIRATORY (INHALATION) at 11:28

## 2019-03-21 NOTE — ED PROVIDER NOTES
Subjective   History of Present Illness    63-year-old female history of COPD presenting with difficulty breathing.    Patient states that she had onset of difficulty breathing earlier today.  Over the past few days patient has been using her albuterol inhaler more than usual, but ran out of her albuterol this morning.  Patient states this feels similar to previous COPD exacerbations.  Patient also does report some associated chest tightness, worse with the difficulty breathing.  Denies any chest pain, no pain with deep inspiration.  Continues to have a chronic nonproductive cough, unchanged.    Patient denies any associated fevers, chills, nausea, vomiting, productive cough    Review of Systems   Constitutional: Negative for chills and fever.   HENT: Negative for congestion and sinus pain.    Eyes: Negative for pain.   Respiratory: Positive for cough, shortness of breath and wheezing.    Cardiovascular: Negative for chest pain.   Gastrointestinal: Negative for abdominal pain and vomiting.   Genitourinary: Negative for flank pain.   Musculoskeletal: Negative for back pain.   Skin: Negative for wound.   Neurological: Negative for syncope and weakness.   Psychiatric/Behavioral: The patient is not hyperactive.        Past Medical History:   Diagnosis Date   • Arthritis    • COPD (chronic obstructive pulmonary disease) (CMS/HCC)    • GERD (gastroesophageal reflux disease)        No Known Allergies    Past Surgical History:   Procedure Laterality Date   • ABDOMINAL SURGERY     •  SECTION     • ENDOSCOPY N/A 10/20/2017    Procedure: ESOPHAGOGASTRODUODENOSCOPY WITH ANESTHESIA;  Surgeon: Femi Goddard MD;  Location: [facility];  Service:    • HERNIA REPAIR     • TONSILLECTOMY         Family History   Problem Relation Age of Onset   • Cancer Mother    • Cancer Father        Social History     Socioeconomic History   • Marital status: Single     Spouse name: Not on file   • Number of children: Not on file   • Years  of education: Not on file   • Highest education level: Not on file   Tobacco Use   • Smoking status: Current Every Day Smoker     Packs/day: 1.00     Years: 40.00     Pack years: 40.00     Types: Cigarettes   Substance and Sexual Activity   • Alcohol use: No   • Drug use: No   • Sexual activity: Defer     Comment: recieved partient from the ED dept . No history with chart            Objective   Physical Exam   Constitutional: She is oriented to person, place, and time. She appears well-developed and well-nourished.   HENT:   Head: Normocephalic and atraumatic.   Eyes: Conjunctivae are normal.   Neck: Normal range of motion.   Cardiovascular: Normal rate, regular rhythm and intact distal pulses.   Pulmonary/Chest: No respiratory distress. She has wheezes.   Scattered and expiratory wheezes  Prolonged expiratory phase  Speaking full sentences  Good air movement bilaterally   Abdominal: Soft. She exhibits no distension. There is no tenderness.   Musculoskeletal: She exhibits no edema.   Neurological: She is alert and oriented to person, place, and time.   Skin: Skin is warm and dry.   Psychiatric: She has a normal mood and affect.   Nursing note and vitals reviewed.      Procedures           ED Course  ED Course as of Mar 21 2154   Thu Mar 21, 2019   1245 Patient screen sepsis positive, but based on my evaluation of the patient there is no infectious source at this time.   [NR]   1336 On repeat exam patient is resting comfortably.  Greatly improved aeration, only minimal wheezing.  Speaking in full sentences.  Patient is comfortable with plan for discharge and outpatient follow-up.  Patient states she had a friend go  more albuterol while she was in the emergency department.  Patient will be discharged with a prescription for prednisone.  [NR]      ED Course User Index  [NR] Guillaume Spann MD                  [facility]  Number of Diagnoses or Management Options  COPD exacerbation (CMS/[facility]):   Diagnosis  management comments: 63-year-old female presenting with difficulty breathing.  Signs and symptoms consistent with a mild COPD exacerbation.  Patient is in not in any respiratory distress, speaking full sentences, no accessory muscle use.  Patient will be treated with stacked nebulizer treatment and steroids.  VBG reveals a normal pH and no evidence of respiratory acidosis.  Following treatment and short observation.  Patient can likely be discharged if clinical condition continues to improve.       Amount and/or Complexity of Data Reviewed  Clinical lab tests: reviewed  Tests in the radiology section of CPT®: reviewed  Tests in the medicine section of CPT®: reviewed          Final diagnoses:   COPD exacerbation (CMS/[facility])            Guillaume Spann MD  03/21/19 3549

## 2019-09-07 ENCOUNTER — HOSPITAL ENCOUNTER (EMERGENCY)
Facility: HOSPITAL | Age: 64
Discharge: HOME | End: 2019-09-07
Admitting: EMERGENCY MEDICINE

## 2019-09-07 ENCOUNTER — APPOINTMENT (OUTPATIENT)
Dept: GENERAL RADIOLOGY | Facility: HOSPITAL | Age: 64
End: 2019-09-07

## 2019-09-07 ENCOUNTER — APPOINTMENT (OUTPATIENT)
Dept: CT IMAGING | Facility: HOSPITAL | Age: 64
End: 2019-09-07

## 2019-09-07 VITALS
WEIGHT: 106 LBS | RESPIRATION RATE: 27 BRPM | DIASTOLIC BLOOD PRESSURE: 74 MMHG | HEIGHT: 65 IN | OXYGEN SATURATION: 98 % | TEMPERATURE: 99.4 F | SYSTOLIC BLOOD PRESSURE: 137 MMHG | HEART RATE: 85 BPM | BODY MASS INDEX: 17.66 KG/M2

## 2019-09-07 DIAGNOSIS — J40 BRONCHITIS: ICD-10-CM

## 2019-09-07 DIAGNOSIS — J44.1 COPD WITH ACUTE EXACERBATION (HCC): Primary | ICD-10-CM

## 2019-09-07 DIAGNOSIS — E04.1 THYROID NODULE: ICD-10-CM

## 2019-09-07 DIAGNOSIS — R93.89 ABNORMAL CT OF THE CHEST: ICD-10-CM

## 2019-09-07 LAB
ALBUMIN SERPL-MCNC: 4.1 G/DL (ref 3.5–5)
ALBUMIN/GLOB SERPL: 1.4 G/DL (ref 1.1–2.5)
ALP SERPL-CCNC: 65 U/L (ref 24–120)
ALT SERPL W P-5'-P-CCNC: 18 U/L (ref 0–54)
ANION GAP SERPL CALCULATED.3IONS-SCNC: 8 MMOL/L (ref 4–13)
ARTERIAL PATENCY WRIST A: POSITIVE
AST SERPL-CCNC: 27 U/L (ref 7–45)
ATMOSPHERIC PRESS: 753 MMHG
BASE EXCESS BLDA CALC-SCNC: -0.4 MMOL/L (ref 0–2)
BASOPHILS # BLD AUTO: 0.1 10*3/MM3 (ref 0–0.2)
BASOPHILS NFR BLD AUTO: 0.9 % (ref 0–1.5)
BDY SITE: ABNORMAL
BILIRUB SERPL-MCNC: 0.3 MG/DL (ref 0.1–1)
BODY TEMPERATURE: 37 C
BUN BLD-MCNC: 11 MG/DL (ref 5–21)
BUN/CREAT SERPL: 19.3 (ref 7–25)
CALCIUM SPEC-SCNC: 9 MG/DL (ref 8.4–10.4)
CHLORIDE SERPL-SCNC: 106 MMOL/L (ref 98–110)
CO2 SERPL-SCNC: 25 MMOL/L (ref 24–31)
CREAT BLD-MCNC: 0.57 MG/DL (ref 0.5–1.4)
D DIMER PPP FEU-MCNC: 0.98 MG/L (FEU) (ref 0–0.5)
DEPRECATED RDW RBC AUTO: 46.3 FL (ref 37–54)
EOSINOPHIL # BLD AUTO: 0.22 10*3/MM3 (ref 0–0.4)
EOSINOPHIL NFR BLD AUTO: 2 % (ref 0.3–6.2)
ERYTHROCYTE [DISTWIDTH] IN BLOOD BY AUTOMATED COUNT: 13.7 % (ref 12.3–15.4)
GFR SERPL CREATININE-BSD FRML MDRD: 107 ML/MIN/1.73
GLOBULIN UR ELPH-MCNC: 2.9 GM/DL
GLUCOSE BLD-MCNC: 156 MG/DL (ref 70–100)
HCO3 BLDA-SCNC: 23.2 MMOL/L (ref 20–26)
HCT VFR BLD AUTO: 35.3 % (ref 34–46.6)
HGB BLD-MCNC: 11.8 G/DL (ref 12–15.9)
IMM GRANULOCYTES # BLD AUTO: 0.04 10*3/MM3 (ref 0–0.05)
IMM GRANULOCYTES NFR BLD AUTO: 0.4 % (ref 0–0.5)
LYMPHOCYTES # BLD AUTO: 1.86 10*3/MM3 (ref 0.7–3.1)
LYMPHOCYTES NFR BLD AUTO: 16.6 % (ref 19.6–45.3)
Lab: ABNORMAL
MCH RBC QN AUTO: 30.7 PG (ref 26.6–33)
MCHC RBC AUTO-ENTMCNC: 33.4 G/DL (ref 31.5–35.7)
MCV RBC AUTO: 91.9 FL (ref 79–97)
MODALITY: ABNORMAL
MONOCYTES # BLD AUTO: 0.99 10*3/MM3 (ref 0.1–0.9)
MONOCYTES NFR BLD AUTO: 8.8 % (ref 5–12)
NEUTROPHILS # BLD AUTO: 7.99 10*3/MM3 (ref 1.7–7)
NEUTROPHILS NFR BLD AUTO: 71.3 % (ref 42.7–76)
NRBC BLD AUTO-RTO: 0 /100 WBC (ref 0–0.2)
NT-PROBNP SERPL-MCNC: 75 PG/ML (ref 0–900)
PCO2 BLDA: 34.1 MM HG (ref 35–45)
PH BLDA: 7.44 PH UNITS (ref 7.35–7.45)
PLATELET # BLD AUTO: 483 10*3/MM3 (ref 140–450)
PMV BLD AUTO: 8.9 FL (ref 6–12)
PO2 BLDA: 84.9 MM HG (ref 83–108)
POTASSIUM BLD-SCNC: 3.4 MMOL/L (ref 3.5–5.3)
PROT SERPL-MCNC: 7 G/DL (ref 6.3–8.7)
RBC # BLD AUTO: 3.84 10*6/MM3 (ref 3.77–5.28)
SAO2 % BLDCOA: 97.7 % (ref 94–99)
SODIUM BLD-SCNC: 139 MMOL/L (ref 135–145)
TROPONIN I SERPL-MCNC: <0.012 NG/ML (ref 0–0.03)
VENTILATOR MODE: ABNORMAL
WBC NRBC COR # BLD: 11.2 10*3/MM3 (ref 3.4–10.8)

## 2019-09-07 PROCEDURE — 0 IOPAMIDOL PER 1 ML: Performed by: PHYSICIAN ASSISTANT

## 2019-09-07 PROCEDURE — 94640 AIRWAY INHALATION TREATMENT: CPT

## 2019-09-07 PROCEDURE — 85379 FIBRIN DEGRADATION QUANT: CPT | Performed by: PHYSICIAN ASSISTANT

## 2019-09-07 PROCEDURE — 71275 CT ANGIOGRAPHY CHEST: CPT

## 2019-09-07 PROCEDURE — 94799 UNLISTED PULMONARY SVC/PX: CPT

## 2019-09-07 PROCEDURE — 85025 COMPLETE CBC W/AUTO DIFF WBC: CPT | Performed by: PHYSICIAN ASSISTANT

## 2019-09-07 PROCEDURE — 96365 THER/PROPH/DIAG IV INF INIT: CPT

## 2019-09-07 PROCEDURE — 80053 COMPREHEN METABOLIC PANEL: CPT | Performed by: PHYSICIAN ASSISTANT

## 2019-09-07 PROCEDURE — 93005 ELECTROCARDIOGRAM TRACING: CPT | Performed by: PHYSICIAN ASSISTANT

## 2019-09-07 PROCEDURE — 36600 WITHDRAWAL OF ARTERIAL BLOOD: CPT

## 2019-09-07 PROCEDURE — 99284 EMERGENCY DEPT VISIT MOD MDM: CPT

## 2019-09-07 PROCEDURE — 84484 ASSAY OF TROPONIN QUANT: CPT | Performed by: PHYSICIAN ASSISTANT

## 2019-09-07 PROCEDURE — 25010000002 METHYLPREDNISOLONE PER 125 MG: Performed by: PHYSICIAN ASSISTANT

## 2019-09-07 PROCEDURE — 83880 ASSAY OF NATRIURETIC PEPTIDE: CPT | Performed by: PHYSICIAN ASSISTANT

## 2019-09-07 PROCEDURE — 93010 ELECTROCARDIOGRAM REPORT: CPT | Performed by: INTERNAL MEDICINE

## 2019-09-07 PROCEDURE — 71045 X-RAY EXAM CHEST 1 VIEW: CPT

## 2019-09-07 PROCEDURE — 96375 TX/PRO/DX INJ NEW DRUG ADDON: CPT

## 2019-09-07 PROCEDURE — 82803 BLOOD GASES ANY COMBINATION: CPT

## 2019-09-07 PROCEDURE — 87040 BLOOD CULTURE FOR BACTERIA: CPT | Performed by: PHYSICIAN ASSISTANT

## 2019-09-07 RX ORDER — ALBUTEROL SULFATE 90 UG/1
2 AEROSOL, METERED RESPIRATORY (INHALATION) EVERY 4 HOURS PRN
Qty: 8.5 G | Refills: 0 | Status: SHIPPED | OUTPATIENT
Start: 2019-09-07

## 2019-09-07 RX ORDER — ALBUTEROL SULFATE 2.5 MG/3ML
2.5 SOLUTION RESPIRATORY (INHALATION)
Status: DISPENSED | OUTPATIENT
Start: 2019-09-07 | End: 2019-09-07

## 2019-09-07 RX ORDER — SODIUM CHLORIDE 0.9 % (FLUSH) 0.9 %
10 SYRINGE (ML) INJECTION AS NEEDED
Status: DISCONTINUED | OUTPATIENT
Start: 2019-09-07 | End: 2019-09-07 | Stop reason: HOSPADM

## 2019-09-07 RX ORDER — BUDESONIDE AND FORMOTEROL FUMARATE DIHYDRATE 160; 4.5 UG/1; UG/1
2 AEROSOL RESPIRATORY (INHALATION)
Qty: 1 INHALER | Refills: 0 | Status: SHIPPED | OUTPATIENT
Start: 2019-09-07

## 2019-09-07 RX ORDER — AZITHROMYCIN 250 MG/1
TABLET, FILM COATED ORAL
Qty: 6 TABLET | Refills: 0 | Status: ON HOLD | OUTPATIENT
Start: 2019-09-07 | End: 2020-07-12

## 2019-09-07 RX ORDER — HYDROCODONE BITARTRATE AND ACETAMINOPHEN 5; 325 MG/1; MG/1
1 TABLET ORAL ONCE
Status: COMPLETED | OUTPATIENT
Start: 2019-09-07 | End: 2019-09-07

## 2019-09-07 RX ORDER — METHYLPREDNISOLONE SODIUM SUCCINATE 125 MG/2ML
125 INJECTION, POWDER, LYOPHILIZED, FOR SOLUTION INTRAMUSCULAR; INTRAVENOUS ONCE
Status: COMPLETED | OUTPATIENT
Start: 2019-09-07 | End: 2019-09-07

## 2019-09-07 RX ORDER — METHYLPREDNISOLONE 4 MG/1
TABLET ORAL
Qty: 21 EACH | Refills: 0 | Status: ON HOLD | OUTPATIENT
Start: 2019-09-07 | End: 2020-07-12

## 2019-09-07 RX ORDER — POTASSIUM CHLORIDE 750 MG/1
10 CAPSULE, EXTENDED RELEASE ORAL DAILY
Status: DISCONTINUED | OUTPATIENT
Start: 2019-09-07 | End: 2019-09-07 | Stop reason: HOSPADM

## 2019-09-07 RX ADMIN — POTASSIUM CHLORIDE 10 MEQ: 750 CAPSULE, EXTENDED RELEASE ORAL at 10:24

## 2019-09-07 RX ADMIN — ALBUTEROL SULFATE 2.5 MG: 2.5 SOLUTION RESPIRATORY (INHALATION) at 09:22

## 2019-09-07 RX ADMIN — IOPAMIDOL 70 ML: 755 INJECTION, SOLUTION INTRAVENOUS at 10:32

## 2019-09-07 RX ADMIN — HYDROCODONE BITARTRATE AND ACETAMINOPHEN 1 TABLET: 5; 325 TABLET ORAL at 12:32

## 2019-09-07 RX ADMIN — METHYLPREDNISOLONE SODIUM SUCCINATE 125 MG: 125 INJECTION, POWDER, FOR SOLUTION INTRAMUSCULAR; INTRAVENOUS at 09:12

## 2019-09-07 RX ADMIN — DOXYCYCLINE 100 MG: 100 INJECTION, POWDER, LYOPHILIZED, FOR SOLUTION INTRAVENOUS at 09:13

## 2019-09-07 NOTE — ED PROVIDER NOTES
Subjective   History of Present Illness    Patient is a pleasant 64-year-old female with chief complaint of worsening shortness of breath and cough.  The patient describes a history of shortness of breath for 5 to 10 years.  She does have COPD but is not oxygen dependent.  She describes a prolonged history of being short of breath when she walks upstairs.  However, in the past few weeks, she has had increased difficulty doing daily activities.  Walking across the room makes her short of breath.  She sleeps with at least 3 pillows propped up but that remains unchanged.  She has chest pain only with cough and deep inspiration.  In the past 2 days, she has had increased production with her cough with white to green productive phlegm.  She denies any hemoptysis.  She denies any known fever.  She denies any leg pain or swelling.  She denies any history of DVT or PE.  She does have a history of pneumonia in the past.  Her last episode was documented November 2018.  She was hospitalized then. There was concerned patient may have had tuberculosis.  She describes that she has followed-up with pulmonologist although she cannot remember the physician's name.  She believes they ultimately diagnosed her with a viral pneumonia.    The patient describes that she does not normally have medical attention.  She does not have breathing treatments or inhalers at home.  Aside from her other medications, she is only been taking ibuprofen for her arthritic pain.    Review of Systems   Constitutional: Positive for activity change and fatigue. Negative for appetite change and fever.   HENT: Negative.    Eyes: Negative.    Respiratory: Positive for cough, shortness of breath and wheezing. Negative for chest tightness.    Cardiovascular: Positive for chest pain. Negative for leg swelling.   Gastrointestinal: Negative.    Genitourinary: Negative.    Musculoskeletal: Negative.    Skin: Negative.    Neurological: Negative.     Psychiatric/Behavioral: Negative.        Past Medical History:   Diagnosis Date   • Arthritis    • COPD (chronic obstructive pulmonary disease) (CMS/HCC)    • GERD (gastroesophageal reflux disease)        No Known Allergies    Past Surgical History:   Procedure Laterality Date   • ABDOMINAL SURGERY     •  SECTION     • ENDOSCOPY N/A 10/20/2017    Procedure: ESOPHAGOGASTRODUODENOSCOPY WITH ANESTHESIA;  Surgeon: Femi Goddard MD;  Location: [facility];  Service:    • HERNIA REPAIR     • TONSILLECTOMY         Family History   Problem Relation Age of Onset   • Cancer Mother    • Cancer Father        Social History     Socioeconomic History   • Marital status: Single     Spouse name: Not on file   • Number of children: Not on file   • Years of education: Not on file   • Highest education level: Not on file   Tobacco Use   • Smoking status: Current Every Day Smoker     Packs/day: 1.00     Years: 40.00     Pack years: 40.00     Types: Cigarettes   Substance and Sexual Activity   • Alcohol use: No   • Drug use: No   • Sexual activity: Defer     Comment: recieved partient from the ED dept . No history with chart        Prior to Admission medications    Medication Sig Start Date End Date Taking? Authorizing Provider   albuterol (PROVENTIL HFA;VENTOLIN HFA) 108 (90 Base) MCG/ACT inhaler Inhale 2 puffs by mouth Every 4 (Four) Hours As Needed for Wheezing. 18   Matias Snell MD   azithromycin (ZITHROMAX) 250 MG tablet Take 1 tablet daily for 7 days 18   Matias Snell MD   budesonide-formoterol (SYMBICORT) 160-4.5 MCG/ACT inhaler Inhale 2 puffs 2 (Two) Times a Day. 18   Matias Snell MD       Medications   sodium chloride 0.9 % flush 10 mL (not administered)   albuterol (PROVENTIL) nebulizer solution 0.083% 2.5 mg/3mL (2.5 mg Nebulization Given 19 0922)   potassium chloride (MICRO-K) CR capsule 10 mEq (10 mEq Oral Given 19 1024)   HYDROcodone-acetaminophen (NORCO) 5-325 MG per  "tablet 1 tablet (0 tablets Oral Hold 9/7/19 1050)   methylPREDNISolone sodium succinate (SOLU-Medrol) injection 125 mg (125 mg Intravenous Given 9/7/19 0912)   doxycycline (VIBRAMYCIN) 100 mg/100 mL 0.9% NS MBP (0 mg Intravenous Stopped 9/7/19 1023)   iopamidol (ISOVUE-370) 76 % injection 100 mL (70 mL Intravenous Given 9/7/19 1032)       /97   Pulse 76   Temp 98.9 °F (37.2 °C)   Resp 17   Ht 165.1 cm (65\")   Wt 48.1 kg (106 lb)   SpO2 100%   BMI 17.64 kg/m²       Objective   Physical Exam   Constitutional: She is oriented to person, place, and time. She appears well-developed and well-nourished.  Non-toxic appearance. She does not appear ill. No distress.   HENT:   Head: Normocephalic and atraumatic.   Mouth/Throat: Oropharynx is clear and moist.   Eyes: Conjunctivae and EOM are normal. Pupils are equal, round, and reactive to light.   Neck: Normal range of motion. Neck supple. No tracheal deviation present.   Cardiovascular: Normal rate, regular rhythm, normal heart sounds and intact distal pulses.   No murmur heard.  Pulmonary/Chest: Effort normal. She has wheezes. She has rhonchi.   Abdominal: Soft. Bowel sounds are normal. She exhibits no distension and no mass. There is no tenderness. There is no rebound and no guarding.   Musculoskeletal: Normal range of motion. She exhibits no edema.        Right lower leg: Normal. She exhibits no tenderness and no edema.        Left lower leg: Normal. She exhibits no tenderness and no edema.   Negative homans sign   Neurological: She is alert and oriented to person, place, and time. She has normal reflexes.   Skin: Skin is warm and dry. Capillary refill takes less than 2 seconds. She is not diaphoretic.   Psychiatric: She has a normal mood and affect. Her behavior is normal. Judgment and thought content normal.   Nursing note and vitals reviewed.      Procedures         Lab Results (last 24 hours)     Procedure Component Value Units Date/Time    CBC & " Differential [[id number]] Collected:  09/07/19 0909    Specimen:  Blood Updated:  09/07/19 0930    Narrative:       The following orders were created for panel order CBC & Differential.  Procedure                               Abnormality         Status                     ---------                               -----------         ------                     CBC Auto Differential[[id number]]        Abnormal            Final result                 Please view results for these tests on the individual orders.    Comprehensive Metabolic Panel [[id number]]  (Abnormal) Collected:  09/07/19 0909    Specimen:  Blood Updated:  09/07/19 0937     Glucose 156 mg/dL      BUN 11 mg/dL      Creatinine 0.57 mg/dL      Sodium 139 mmol/L      Potassium 3.4 mmol/L      Chloride 106 mmol/L      CO2 25.0 mmol/L      Calcium 9.0 mg/dL      Total Protein 7.0 g/dL      Albumin 4.10 g/dL      ALT (SGPT) 18 U/L      AST (SGOT) 27 U/L      Alkaline Phosphatase 65 U/L      Total Bilirubin 0.3 mg/dL      eGFR Non African Amer 107 mL/min/1.73      Globulin 2.9 gm/dL      A/G Ratio 1.4 g/dL      BUN/Creatinine Ratio 19.3     Anion Gap 8.0 mmol/L     Narrative:       GFR Normal >60  Chronic Kidney Disease <60  Kidney Failure <15    Troponin [[id number]]  (Normal) Collected:  09/07/19 0909    Specimen:  Blood Updated:  09/07/19 0949     Troponin I <0.012 ng/mL     BNP [[id number]]  (Normal) Collected:  09/07/19 0909    Specimen:  Blood Updated:  09/07/19 0949     proBNP 75.0 pg/mL     D-dimer, Quantitative [[id number]]  (Abnormal) Collected:  09/07/19 0909    Specimen:  Blood Updated:  09/07/19 0947     D-Dimer, Quantitative 0.98 mg/L (FEU)     Narrative:       Reference Range is 0-0.50 mg/L FEU. However, results <0.50 mg/L FEU tends to rule out DVT or PE. Results >0.50 mg/L FEU are not useful in predicting absence or presence of DVT or PE.    Blood Culture - Blood, Arm, Left [[id number]] Collected:  09/07/19 0909    Specimen:  Blood from Arm, Left  Updated:  09/07/19 0956    Blood Culture - Blood, Arm, Right [[id number]] Collected:  09/07/19 0909    Specimen:  Blood from Arm, Right Updated:  09/07/19 0957    CBC Auto Differential [[id number]]  (Abnormal) Collected:  09/07/19 0909    Specimen:  Blood Updated:  09/07/19 0930     WBC 11.20 10*3/mm3      RBC 3.84 10*6/mm3      Hemoglobin 11.8 g/dL      Hematocrit 35.3 %      MCV 91.9 fL      MCH 30.7 pg      MCHC 33.4 g/dL      RDW 13.7 %      RDW-SD 46.3 fl      MPV 8.9 fL      Platelets 483 10*3/mm3      Neutrophil % 71.3 %      Lymphocyte % 16.6 %      Monocyte % 8.8 %      Eosinophil % 2.0 %      Basophil % 0.9 %      Immature Grans % 0.4 %      Neutrophils, Absolute 7.99 10*3/mm3      Lymphocytes, Absolute 1.86 10*3/mm3      Monocytes, Absolute 0.99 10*3/mm3      Eosinophils, Absolute 0.22 10*3/mm3      Basophils, Absolute 0.10 10*3/mm3      Immature Grans, Absolute 0.04 10*3/mm3      nRBC 0.0 /100 WBC     Blood Gas, Arterial [[id number]]  (Abnormal) Collected:  09/07/19 0918    Specimen:  Arterial Blood Updated:  09/07/19 0924     Site Right Radial     Dimitri's Test Positive     pH, Arterial 7.442 pH units      pCO2, Arterial 34.1 mm Hg      Comment: 84 Value below reference range        pO2, Arterial 84.9 mm Hg      HCO3, Arterial 23.2 mmol/L      Base Excess, Arterial -0.4 mmol/L      Comment: 84 Value below reference range        O2 Saturation, Arterial 97.7 %      Temperature 37.0 C      Barometric Pressure for Blood Gas 753 mmHg      Modality Room Air     Ventilator Mode NA     Collected by [id number]     Comment: Meter: [id number]     :  [id number]             Xr Chest 1 View    Result Date: 9/7/2019  Narrative: EXAM: XR CHEST 1 VW- - 9/7/2019 9:10 AM CDT  HISTORY: Shortness of breath   COMPARISON: 11/29/2018.  TECHNIQUE:  1 images.  Frontal view of the chest.  FINDINGS:  No pneumothorax, pleural effusion or focal consolidation. Mildly hyperexpanded lungs, which can be seen with chronic lung  disease. Cardiac mediastinal silhouette within normal limits. No acute findings in the bones, soft tissues or upper abdomen.       Impression: 1. Hyperexpanded lungs, which can be seen with chronic lung disease. No acute cardiopulmonary findings. This report was finalized on 09/07/2019 09:51 by Dr Violeta Wu MD.    Ct Angiogram Chest With Contrast    Result Date: 9/7/2019  Narrative: EXAM: CT ANGIOGRAM CHEST W CONTRAST- - 9/7/2019 10:35 AM CDT  HISTORY: Shortness of breath   COMPARISON: 11/29/2018.  DOSE LENGTH PRODUCT: 277 mGy cm. Automatic exposure control was utilized to make radiation dose as low as reasonably achievable.  TECHNIQUE: Enhanced axial CT images obtained with multiplanar reformats. 3D postprocessing, including MIPs, performed and images saved to PACS.  FINDINGS: AIRWAYS/PULMONARY PARENCHYMA: The central airways are midline and patent. Mild thickening of the bibasilar small airways with small internal densities, likely secretions/mucous.  Right lower lobe with a 2.1 cm groundglass opacity at the medial aspect on axial series 6, image 75. There is also some small groundglass opacity at the right lower lobe anteriorly on axial image 86.  Left lower lobe with small central groundglass opacity axial images .  A few 2-3 mm similar pulmonary nodules at the left upper lobe such as axial image 35 compared to 11/29/2018. Similar 3 mm pulmonary nodule at the right upper lobe on axial image 44. Similar atelectasis or scarring at the lingula.   No pleural effusion or pneumothorax.  VESSELS: Contrast bolus is adequate. No pulmonary artery filling defect to suggest pulmonary embolus. Aorta normal in course and caliber.  CARDIAC:  Borderline heart size. No pericardial effusion.  Scattered coronary artery calcifications.  MEDIASTINUM: A few borderline mediastinal lymph nodes, for instance measures 0.8 cm in short axis on axial image 51. Esophagus has normal coarse, caliber and wall thickness.   EXTRATHORACIC: Multinodular appearing thyroid. No axillary adenopathy. Breast tissue grossly symmetric. The extrathoracic soft tissues are within normal limits.  INCLUDED UPPER ABDOMEN: The visualized portions of the upper abdomen are within normal limits.  OSSEOUS: Diffusely heterogeneous bone mineralization, which can be seen with aggressive osteopenia, but infiltrative process such as multiple myeloma not excluded.       Impression: 1. No evidence of pulmonary embolism. 2. A few small patchy groundglass opacities bilaterally with mild thickening of the bibasilar small airways and some inspissated secretions. This is likely infectious or inflammatory. There are also a few scattered 2-3 mm pulmonary nodules, similar compared to 11/29/2018. Follow-up in 12 months could be considered. 3. Diffusely heterogeneous bone mineralization, which can be seen with aggressive osteopenia, but an infiltrative process such as multiple myeloma is not excluded. Correlate with patient history. 4. A few borderline mediastinal lymph nodes, likely reactive. 5. Multinodular appearing thyroid. 6. Coronary artery calcifications. This report was finalized on 09/07/2019 10:54 by Dr Violeta Wu MD.      ED Course  ED Course as of Sep 07 1107   Sat Sep 07, 2019   1034 patient continues not to be in any distress.  ABG does not show any evidence of hypoxia.  He has mild hypokalemia at 3.4. Kdur 10meq  has been ordered. D-dimer is elevated so CTA chest has been ordered as well.  Patient does complain of continued right hand pain is been going on for years.  She has known cervical radiculopathy.  She was offered surgical intervention years ago but declined.  She reports this is her known right hand pain that has persisted.  She requests pain medication for this.  Norco has been ordered.  [TK]   1103 Patient has been reassessed.  She continues not to be in any distress.  I have educated her of her laboratory data as well as abnormal CT findings  consistent of patchy ground glass opacities bilaterally with mild thickening of the bibasilar small airways and some inspissated secretions.  This could be infectious or inflammatory.  She does have scattered pulmonary nodules.  She does have diffuse heterogeneous bone mineralization.  She also has multinodular appearing thyroid.  I recommend the patient follow-up with her primary care provider for all of the above results and her COPD exacerbation.  Information for a pulmonologist who prescribed as well.  I will also write antibiotics, breathing treatments, and steroids.  Strict return precautions advised.  Patient be discharged stable condition.  [TK]      ED Course User Index  [TK] Ebony Chen PA          MDM  Number of Diagnoses or Management Options  Diagnosis management comments: Shortness of breath differential includes but not limited to:  Pneumonia, COPD, CHF, bronchitis, pericardial effusion with or without tamponade, pulmonary embolism, myocardial infarction, pulmonary edema, pulmonary effusion, asthma, allergic reaction, anxiety reaction, etc...            Final diagnoses:   COPD with acute exacerbation (CMS/[facility])   Abnormal CT of the chest   Thyroid nodule   Bronchitis          Ebony Chen PA  09/07/19 5856

## 2019-09-07 NOTE — DISCHARGE INSTRUCTIONS
COPD and Physical Activity  Chronic obstructive pulmonary disease (COPD) is a long-term (chronic) condition that affects the lungs. COPD is a general term that can be used to describe many different lung problems that cause lung swelling (inflammation) and limit airflow, including chronic bronchitis and emphysema.  The main symptom of COPD is shortness of breath, which makes it harder to do even simple tasks. This can also make it harder to exercise and be active. Talk with your health care provider about treatments to help you breathe better and actions you can take to prevent breathing problems during physical activity.  What are the benefits of exercising with COPD?  Exercising regularly is an important part of a healthy lifestyle. You can still exercise and do physical activities even though you have COPD. Exercise and physical activity improve your shortness of breath by increasing blood flow (circulation). This causes your heart to pump more oxygen through your body. Moderate exercise can improve your:  · Oxygen use.  · Energy level.  · Shortness of breath.  · Strength in your breathing muscles.  · Heart health.  · Sleep.  · Self-esteem and feelings of self-worth.  · Depression, stress, and anxiety levels.  Exercise can benefit everyone with COPD. The severity of your disease may affect how hard you can exercise, especially at first, but everyone can benefit. Talk with your health care provider about how much exercise is safe for you, and which activities and exercises are safe for you.  What actions can I take to prevent breathing problems during physical activity?  · Sign up for a pulmonary rehabilitation program. This type of program may include:  ? Education about lung diseases.  ? Exercise classes that teach you how to exercise and be more active while improving your breathing. This usually involves:  § Exercise using your lower extremities, such as a stationary bicycle.  § About 30 minutes of exercise, 2  to 5 times per week, for 6 to 12 weeks  § Strength training, such as push ups or leg lifts.  ? Nutrition education.  ? Group classes in which you can talk with others who also have COPD and learn ways to manage stress.  · If you use an oxygen tank, you should use it while you exercise. Work with your health care provider to adjust your oxygen for your physical activity. Your resting flow rate is different from your flow rate during physical activity.  · While you are exercising:  ? Take slow breaths.  ? Pace yourself and do not try to go too fast.  ? Purse your lips while breathing out. Pursing your lips is similar to a kissing or whistling position.  ? If doing exercise that uses a quick burst of effort, such as weight lifting:  § Breathe in before starting the exercise.  § Breathe out during the hardest part of the exercise (such as raising the weights).  Where to find support  You can find support for exercising with COPD from:  · Your health care provider.  · A pulmonary rehabilitation program.  · Your local health department or community health programs.  · Support groups, online or in-person. Your health care provider may be able to recommend support groups.  Where to find more information  You can find more information about exercising with COPD from:  · American Lung Association: lung.org.  · COPD Foundation: copdfoundation.org.  Contact a health care provider if:  · Your symptoms get worse.  · You have chest pain.  · You have nausea.  · You have a fever.  · You have trouble talking or catching your breath.  · You want to start a new exercise program or a new activity.  Summary  · COPD is a general term that can be used to describe many different lung problems that cause lung swelling (inflammation) and limit airflow. This includes chronic bronchitis and emphysema.  · Exercise and physical activity improve your shortness of breath by increasing blood flow (circulation). This causes your heart to provide more  oxygen to your body.  · Contact your health care provider before starting any exercise program or new activity. Ask your health care provider what exercises and activities are safe for you.  This information is not intended to replace advice given to you by your health care provider. Make sure you discuss any questions you have with your health care provider.  Document Released: 01/10/2019 Document Revised: 01/10/2019 Document Reviewed: 01/10/2019  [employer] Interactive Patient Education © 2019 [employer]

## 2019-09-12 LAB
BACTERIA SPEC AEROBE CULT: NORMAL
BACTERIA SPEC AEROBE CULT: NORMAL

## 2020-07-11 ENCOUNTER — APPOINTMENT (OUTPATIENT)
Dept: CT IMAGING | Facility: HOSPITAL | Age: 65
End: 2020-07-11

## 2020-07-11 ENCOUNTER — HOSPITAL ENCOUNTER (INPATIENT)
Facility: HOSPITAL | Age: 65
LOS: 5 days | Discharge: HOME | End: 2020-07-16
Attending: FAMILY MEDICINE | Admitting: SPECIALIST

## 2020-07-11 DIAGNOSIS — K83.8 BILIARY SLUDGE: ICD-10-CM

## 2020-07-11 DIAGNOSIS — K52.9 GASTROENTERITIS: ICD-10-CM

## 2020-07-11 DIAGNOSIS — E86.0 DEHYDRATION: Primary | ICD-10-CM

## 2020-07-11 DIAGNOSIS — R10.9 ABDOMINAL PAIN: ICD-10-CM

## 2020-07-11 PROBLEM — J44.9 COPD (CHRONIC OBSTRUCTIVE PULMONARY DISEASE) (HCC): Status: ACTIVE | Noted: 2020-07-11

## 2020-07-11 LAB
ALBUMIN SERPL-MCNC: 4.9 G/DL (ref 3.5–5.2)
ALBUMIN/GLOB SERPL: 1.4 G/DL
ALP SERPL-CCNC: 132 U/L (ref 39–117)
ALT SERPL W P-5'-P-CCNC: 30 U/L (ref 1–33)
AMPHET+METHAMPHET UR QL: NEGATIVE
AMPHETAMINES UR QL: NEGATIVE
ANION GAP SERPL CALCULATED.3IONS-SCNC: 17 MMOL/L (ref 5–15)
AST SERPL-CCNC: 22 U/L (ref 1–32)
BACTERIA UR QL AUTO: ABNORMAL /HPF
BARBITURATES UR QL SCN: NEGATIVE
BASOPHILS # BLD AUTO: 0.1 10*3/MM3 (ref 0–0.2)
BASOPHILS NFR BLD AUTO: 0.5 % (ref 0–1.5)
BENZODIAZ UR QL SCN: NEGATIVE
BILIRUB SERPL-MCNC: 0.6 MG/DL (ref 0–1.2)
BILIRUB UR QL STRIP: NEGATIVE
BUN SERPL-MCNC: 21 MG/DL (ref 8–23)
BUN/CREAT SERPL: 32.3 (ref 7–25)
BUPRENORPHINE SERPL-MCNC: NEGATIVE NG/ML
CALCIUM SPEC-SCNC: 10.3 MG/DL (ref 8.6–10.5)
CANNABINOIDS SERPL QL: POSITIVE
CHLORIDE SERPL-SCNC: 94 MMOL/L (ref 98–107)
CLARITY UR: CLEAR
CO2 SERPL-SCNC: 23 MMOL/L (ref 22–29)
COCAINE UR QL: NEGATIVE
COLOR UR: YELLOW
CREAT SERPL-MCNC: 0.65 MG/DL (ref 0.57–1)
D-LACTATE SERPL-SCNC: 1.6 MMOL/L (ref 0.5–2)
D-LACTATE SERPL-SCNC: 3 MMOL/L (ref 0.5–2)
DEPRECATED RDW RBC AUTO: 50.2 FL (ref 37–54)
EOSINOPHIL # BLD AUTO: 0.02 10*3/MM3 (ref 0–0.4)
EOSINOPHIL NFR BLD AUTO: 0.1 % (ref 0.3–6.2)
ERYTHROCYTE [DISTWIDTH] IN BLOOD BY AUTOMATED COUNT: 15.6 % (ref 12.3–15.4)
GFR SERPL CREATININE-BSD FRML MDRD: 92 ML/MIN/1.73
GLOBULIN UR ELPH-MCNC: 3.5 GM/DL
GLUCOSE SERPL-MCNC: 178 MG/DL (ref 65–99)
GLUCOSE UR STRIP-MCNC: NEGATIVE MG/DL
HCT VFR BLD AUTO: 45.9 % (ref 34–46.6)
HGB BLD-MCNC: 15.9 G/DL (ref 12–15.9)
HGB UR QL STRIP.AUTO: ABNORMAL
HOLD SPECIMEN: NORMAL
HOLD SPECIMEN: NORMAL
HYALINE CASTS UR QL AUTO: ABNORMAL /LPF
IMM GRANULOCYTES # BLD AUTO: 0.14 10*3/MM3 (ref 0–0.05)
IMM GRANULOCYTES NFR BLD AUTO: 0.6 % (ref 0–0.5)
KETONES UR QL STRIP: NEGATIVE
LACTATE HOLD SPECIMEN: NORMAL
LEUKOCYTE ESTERASE UR QL STRIP.AUTO: NEGATIVE
LIPASE SERPL-CCNC: 15 U/L (ref 13–60)
LYMPHOCYTES # BLD AUTO: 2.27 10*3/MM3 (ref 0.7–3.1)
LYMPHOCYTES NFR BLD AUTO: 10.3 % (ref 19.6–45.3)
MCH RBC QN AUTO: 30.6 PG (ref 26.6–33)
MCHC RBC AUTO-ENTMCNC: 34.6 G/DL (ref 31.5–35.7)
MCV RBC AUTO: 88.4 FL (ref 79–97)
METHADONE UR QL SCN: NEGATIVE
MONOCYTES # BLD AUTO: 1.34 10*3/MM3 (ref 0.1–0.9)
MONOCYTES NFR BLD AUTO: 6.1 % (ref 5–12)
NEUTROPHILS NFR BLD AUTO: 18.23 10*3/MM3 (ref 1.7–7)
NEUTROPHILS NFR BLD AUTO: 82.4 % (ref 42.7–76)
NITRITE UR QL STRIP: NEGATIVE
NRBC BLD AUTO-RTO: 0 /100 WBC (ref 0–0.2)
OPIATES UR QL: POSITIVE
OXYCODONE UR QL SCN: NEGATIVE
PCP UR QL SCN: NEGATIVE
PH UR STRIP.AUTO: 7 [PH] (ref 5–8)
PLATELET # BLD AUTO: 722 10*3/MM3 (ref 140–450)
PMV BLD AUTO: 8.9 FL (ref 6–12)
POTASSIUM SERPL-SCNC: 3.6 MMOL/L (ref 3.5–5.2)
PROPOXYPH UR QL: NEGATIVE
PROT SERPL-MCNC: 8.4 G/DL (ref 6–8.5)
PROT UR QL STRIP: ABNORMAL
RBC # BLD AUTO: 5.19 10*6/MM3 (ref 3.77–5.28)
RBC # UR: ABNORMAL /HPF
REF LAB TEST METHOD: ABNORMAL
SARS-COV-2 RDRP RESP QL NAA+PROBE: NOT DETECTED
SODIUM SERPL-SCNC: 134 MMOL/L (ref 136–145)
SP GR UR STRIP: >1.03 (ref 1–1.03)
SQUAMOUS #/AREA URNS HPF: ABNORMAL /HPF
TRICYCLICS UR QL SCN: NEGATIVE
TROPONIN T SERPL-MCNC: 0.02 NG/ML (ref 0–0.03)
TROPONIN T SERPL-MCNC: 0.02 NG/ML (ref 0–0.03)
UROBILINOGEN UR QL STRIP: ABNORMAL
WBC # BLD AUTO: 22.1 10*3/MM3 (ref 3.4–10.8)
WBC UR QL AUTO: ABNORMAL /HPF
WHOLE BLOOD HOLD SPECIMEN: NORMAL
WHOLE BLOOD HOLD SPECIMEN: NORMAL

## 2020-07-11 PROCEDURE — 25010000002 ONDANSETRON PER 1 MG: Performed by: FAMILY MEDICINE

## 2020-07-11 PROCEDURE — 25810000003 SODIUM CHLORIDE 0.9 % WITH KCL 20 MEQ 20-0.9 MEQ/L-% SOLUTION: Performed by: FAMILY MEDICINE

## 2020-07-11 PROCEDURE — 87040 BLOOD CULTURE FOR BACTERIA: CPT

## 2020-07-11 PROCEDURE — 87040 BLOOD CULTURE FOR BACTERIA: CPT | Performed by: FAMILY MEDICINE

## 2020-07-11 PROCEDURE — 80053 COMPREHEN METABOLIC PANEL: CPT

## 2020-07-11 PROCEDURE — 80306 DRUG TEST PRSMV INSTRMNT: CPT | Performed by: FAMILY MEDICINE

## 2020-07-11 PROCEDURE — G0378 HOSPITAL OBSERVATION PER HR: HCPCS

## 2020-07-11 PROCEDURE — 81001 URINALYSIS AUTO W/SCOPE: CPT | Performed by: FAMILY MEDICINE

## 2020-07-11 PROCEDURE — 83605 ASSAY OF LACTIC ACID: CPT | Performed by: FAMILY MEDICINE

## 2020-07-11 PROCEDURE — 36415 COLL VENOUS BLD VENIPUNCTURE: CPT

## 2020-07-11 PROCEDURE — 94640 AIRWAY INHALATION TREATMENT: CPT

## 2020-07-11 PROCEDURE — 85025 COMPLETE CBC W/AUTO DIFF WBC: CPT

## 2020-07-11 PROCEDURE — 84484 ASSAY OF TROPONIN QUANT: CPT | Performed by: FAMILY MEDICINE

## 2020-07-11 PROCEDURE — 87635 SARS-COV-2 COVID-19 AMP PRB: CPT | Performed by: FAMILY MEDICINE

## 2020-07-11 PROCEDURE — 83690 ASSAY OF LIPASE: CPT | Performed by: FAMILY MEDICINE

## 2020-07-11 PROCEDURE — 83605 ASSAY OF LACTIC ACID: CPT

## 2020-07-11 PROCEDURE — 94799 UNLISTED PULMONARY SVC/PX: CPT

## 2020-07-11 PROCEDURE — 99285 EMERGENCY DEPT VISIT HI MDM: CPT

## 2020-07-11 PROCEDURE — 74177 CT ABD & PELVIS W/CONTRAST: CPT

## 2020-07-11 PROCEDURE — 25010000002 MORPHINE PER 10 MG: Performed by: FAMILY MEDICINE

## 2020-07-11 PROCEDURE — 25010000002 IOPAMIDOL 61 % SOLUTION: Performed by: FAMILY MEDICINE

## 2020-07-11 PROCEDURE — 25810000003 SODIUM CHLORIDE 0.9 % WITH KCL 20 MEQ 20-0.9 MEQ/L-% SOLUTION: Performed by: INTERNAL MEDICINE

## 2020-07-11 PROCEDURE — 25010000002 LEVOFLOXACIN PER 250 MG: Performed by: FAMILY MEDICINE

## 2020-07-11 RX ORDER — ONDANSETRON 2 MG/ML
4 INJECTION INTRAMUSCULAR; INTRAVENOUS EVERY 6 HOURS PRN
Status: DISCONTINUED | OUTPATIENT
Start: 2020-07-11 | End: 2020-07-15

## 2020-07-11 RX ORDER — ALBUTEROL SULFATE 90 UG/1
2 AEROSOL, METERED RESPIRATORY (INHALATION) EVERY 4 HOURS PRN
Status: DISCONTINUED | OUTPATIENT
Start: 2020-07-11 | End: 2020-07-11 | Stop reason: CLARIF

## 2020-07-11 RX ORDER — SODIUM CHLORIDE 0.9 % (FLUSH) 0.9 %
10 SYRINGE (ML) INJECTION AS NEEDED
Status: DISCONTINUED | OUTPATIENT
Start: 2020-07-11 | End: 2020-07-16 | Stop reason: HOSPADM

## 2020-07-11 RX ORDER — ALUMINA, MAGNESIA, AND SIMETHICONE 2400; 2400; 240 MG/30ML; MG/30ML; MG/30ML
15 SUSPENSION ORAL ONCE
Status: COMPLETED | OUTPATIENT
Start: 2020-07-11 | End: 2020-07-11

## 2020-07-11 RX ORDER — ACETAMINOPHEN 325 MG/1
650 TABLET ORAL EVERY 4 HOURS PRN
Status: DISCONTINUED | OUTPATIENT
Start: 2020-07-11 | End: 2020-07-16 | Stop reason: HOSPADM

## 2020-07-11 RX ORDER — ALUMINA, MAGNESIA, AND SIMETHICONE 2400; 2400; 240 MG/30ML; MG/30ML; MG/30ML
15 SUSPENSION ORAL EVERY 6 HOURS PRN
Status: DISCONTINUED | OUTPATIENT
Start: 2020-07-11 | End: 2020-07-16 | Stop reason: HOSPADM

## 2020-07-11 RX ORDER — ONDANSETRON 2 MG/ML
4 INJECTION INTRAMUSCULAR; INTRAVENOUS ONCE
Status: COMPLETED | OUTPATIENT
Start: 2020-07-11 | End: 2020-07-11

## 2020-07-11 RX ORDER — LEVOFLOXACIN 5 MG/ML
500 INJECTION, SOLUTION INTRAVENOUS ONCE
Status: COMPLETED | OUTPATIENT
Start: 2020-07-11 | End: 2020-07-11

## 2020-07-11 RX ORDER — PANTOPRAZOLE SODIUM 40 MG/10ML
40 INJECTION, POWDER, LYOPHILIZED, FOR SOLUTION INTRAVENOUS ONCE
Status: COMPLETED | OUTPATIENT
Start: 2020-07-11 | End: 2020-07-11

## 2020-07-11 RX ORDER — ALBUTEROL SULFATE 2.5 MG/3ML
2.5 SOLUTION RESPIRATORY (INHALATION) EVERY 4 HOURS PRN
Status: DISCONTINUED | OUTPATIENT
Start: 2020-07-11 | End: 2020-07-16 | Stop reason: HOSPADM

## 2020-07-11 RX ORDER — FAMOTIDINE 10 MG/ML
20 INJECTION, SOLUTION INTRAVENOUS ONCE
Status: COMPLETED | OUTPATIENT
Start: 2020-07-11 | End: 2020-07-11

## 2020-07-11 RX ORDER — BUDESONIDE AND FORMOTEROL FUMARATE DIHYDRATE 160; 4.5 UG/1; UG/1
2 AEROSOL RESPIRATORY (INHALATION)
Status: DISCONTINUED | OUTPATIENT
Start: 2020-07-11 | End: 2020-07-16 | Stop reason: HOSPADM

## 2020-07-11 RX ORDER — HYDROCODONE BITARTRATE AND ACETAMINOPHEN 5; 325 MG/1; MG/1
1 TABLET ORAL EVERY 4 HOURS PRN
Status: DISCONTINUED | OUTPATIENT
Start: 2020-07-11 | End: 2020-07-16 | Stop reason: HOSPADM

## 2020-07-11 RX ORDER — HYDROCODONE BITARTRATE AND ACETAMINOPHEN 10; 325 MG/1; MG/1
1 TABLET ORAL EVERY 4 HOURS PRN
Status: DISCONTINUED | OUTPATIENT
Start: 2020-07-11 | End: 2020-07-16 | Stop reason: HOSPADM

## 2020-07-11 RX ORDER — SODIUM CHLORIDE AND POTASSIUM CHLORIDE 150; 900 MG/100ML; MG/100ML
75 INJECTION, SOLUTION INTRAVENOUS CONTINUOUS
Status: DISCONTINUED | OUTPATIENT
Start: 2020-07-11 | End: 2020-07-16 | Stop reason: HOSPADM

## 2020-07-11 RX ORDER — LIDOCAINE HYDROCHLORIDE 20 MG/ML
15 SOLUTION OROPHARYNGEAL ONCE
Status: COMPLETED | OUTPATIENT
Start: 2020-07-11 | End: 2020-07-11

## 2020-07-11 RX ORDER — SODIUM CHLORIDE 0.9 % (FLUSH) 0.9 %
10 SYRINGE (ML) INJECTION EVERY 12 HOURS SCHEDULED
Status: DISCONTINUED | OUTPATIENT
Start: 2020-07-11 | End: 2020-07-16 | Stop reason: HOSPADM

## 2020-07-11 RX ADMIN — METRONIDAZOLE 500 MG: 500 INJECTION, SOLUTION INTRAVENOUS at 15:39

## 2020-07-11 RX ADMIN — MORPHINE SULFATE 4 MG: 4 INJECTION, SOLUTION INTRAMUSCULAR; INTRAVENOUS at 15:38

## 2020-07-11 RX ADMIN — IOPAMIDOL 100 ML: 612 INJECTION, SOLUTION INTRAVENOUS at 14:27

## 2020-07-11 RX ADMIN — SODIUM CHLORIDE 1000 ML: 9 INJECTION, SOLUTION INTRAVENOUS at 13:15

## 2020-07-11 RX ADMIN — POTASSIUM CHLORIDE AND SODIUM CHLORIDE 100 ML/HR: 900; 150 INJECTION, SOLUTION INTRAVENOUS at 21:54

## 2020-07-11 RX ADMIN — ONDANSETRON HYDROCHLORIDE 4 MG: 2 SOLUTION INTRAMUSCULAR; INTRAVENOUS at 20:47

## 2020-07-11 RX ADMIN — FAMOTIDINE 20 MG: 10 INJECTION, SOLUTION INTRAVENOUS at 15:39

## 2020-07-11 RX ADMIN — ALUMINUM HYDROXIDE, MAGNESIUM HYDROXIDE, AND DIMETHICONE 15 ML: 400; 400; 40 SUSPENSION ORAL at 15:38

## 2020-07-11 RX ADMIN — SODIUM CHLORIDE, PRESERVATIVE FREE 10 ML: 5 INJECTION INTRAVENOUS at 20:48

## 2020-07-11 RX ADMIN — MORPHINE SULFATE 4 MG: 4 INJECTION, SOLUTION INTRAMUSCULAR; INTRAVENOUS at 13:15

## 2020-07-11 RX ADMIN — PANTOPRAZOLE SODIUM 40 MG: 40 INJECTION, POWDER, FOR SOLUTION INTRAVENOUS at 18:23

## 2020-07-11 RX ADMIN — SODIUM CHLORIDE 1000 ML: 9 INJECTION, SOLUTION INTRAVENOUS at 18:23

## 2020-07-11 RX ADMIN — HYDROCODONE BITARTRATE AND ACETAMINOPHEN 1 TABLET: 5; 325 TABLET ORAL at 20:47

## 2020-07-11 RX ADMIN — HYDROCODONE BITARTRATE AND ACETAMINOPHEN 1 TABLET: 5; 325 TABLET ORAL at 22:05

## 2020-07-11 RX ADMIN — BUDESONIDE AND FORMOTEROL FUMARATE DIHYDRATE 2 PUFF: 160; 4.5 AEROSOL RESPIRATORY (INHALATION) at 21:17

## 2020-07-11 RX ADMIN — LIDOCAINE HYDROCHLORIDE 15 ML: 20 SOLUTION ORAL; TOPICAL at 15:38

## 2020-07-11 RX ADMIN — LEVOFLOXACIN 500 MG: 5 INJECTION, SOLUTION INTRAVENOUS at 15:39

## 2020-07-11 RX ADMIN — ONDANSETRON HYDROCHLORIDE 4 MG: 2 SOLUTION INTRAMUSCULAR; INTRAVENOUS at 13:15

## 2020-07-11 NOTE — ED PROVIDER NOTES
"Subjective   This patient is a 64-year-old female who for the past 3 days or so has had severe nausea and vomiting resulting in approximately 20 episodes of nonbloody vomitus.  She is also had multiple episodes of diarrhea.  Both onset of symptoms of a eased somewhat today and she has had no vomiting and the diarrhea has eased significantly.  She presents to the ER because she just feels \"terrible\" and has severe epigastric pain to the point where even when she takes a deep breath she feels significant epigastric pain.  She is had no chest pain or shortness of breath per se.          Review of Systems   Gastrointestinal: Positive for abdominal pain, diarrhea, nausea and vomiting.       Past Medical History:   Diagnosis Date   • Arthritis    • COPD (chronic obstructive pulmonary disease) (CMS/HCC)    • GERD (gastroesophageal reflux disease)        No Known Allergies    Past Surgical History:   Procedure Laterality Date   • ABDOMINAL SURGERY     •  SECTION     • ENDOSCOPY N/A 10/20/2017    Procedure: ESOPHAGOGASTRODUODENOSCOPY WITH ANESTHESIA;  Surgeon: Femi Goddard MD;  Location: [facility] OR;  Service:    • HERNIA REPAIR     • TONSILLECTOMY         Family History   Problem Relation Age of Onset   • Cancer Mother    • Cancer Father        Social History     Socioeconomic History   • Marital status: Single     Spouse name: Not on file   • Number of children: Not on file   • Years of education: Not on file   • Highest education level: Not on file   Tobacco Use   • Smoking status: Current Every Day Smoker     Packs/day: 1.00     Years: 40.00     Pack years: 40.00     Types: Cigarettes   Substance and Sexual Activity   • Alcohol use: No   • Drug use: No   • Sexual activity: Defer     Comment: recieved partient from the ED dept . No history with chart            Objective   Physical Exam   Constitutional: She is oriented to person, place, and time. She appears well-developed and well-nourished.   HENT:   Head: " Normocephalic and atraumatic.   Mouth/Throat: Oropharynx is clear and moist.   Eyes: Conjunctivae and EOM are normal.   Neck: Normal range of motion. Neck supple.   Cardiovascular: Normal rate, regular rhythm, normal heart sounds and intact distal pulses.   Pulmonary/Chest: Effort normal and breath sounds normal.   Abdominal: Soft. Bowel sounds are normal. There is tenderness in the epigastric area. There is no rigidity, no rebound and no guarding.   Musculoskeletal: Normal range of motion.   Neurological: She is alert and oriented to person, place, and time.   Skin: Skin is warm and dry. Capillary refill takes less than 2 seconds.   Psychiatric: She has a normal mood and affect. Her behavior is normal. Judgment and thought content normal.   Nursing note and vitals reviewed.      Procedures           ED Course                                           MDM  Number of Diagnoses or Management Options     Amount and/or Complexity of Data Reviewed  Clinical lab tests: reviewed and ordered  Tests in the radiology section of CPT®: reviewed and ordered        Final diagnoses:   Dehydration   Gastroenteritis     The patient's work-up was significant for initially elevated lactate and a markedly elevated white blood cell count.  Her CT scan was otherwise reassuring.  Apart from those numbers there is really no compelling reason to admit the patient otherwise.  I discussed the case with the patient however and she just still feels terrible even not tried for quite some hours to turn around.  Her social context is important as well and that she lives alone.  I discussed the case with Dr. Hansen and he kindly agreed to admit the patient overnight for IV fluids and to make sure she is heading in the right direction and feeling better.       Stephen Jackson MD  07/11/20 5434

## 2020-07-11 NOTE — ED PROVIDER NOTES
Subjective   History of Present Illness    Review of Systems    Past Medical History:   Diagnosis Date   • Arthritis    • COPD (chronic obstructive pulmonary disease) (CMS/HCC)    • GERD (gastroesophageal reflux disease)        No Known Allergies    Past Surgical History:   Procedure Laterality Date   • ABDOMINAL SURGERY     •  SECTION     • ENDOSCOPY N/A 10/20/2017    Procedure: ESOPHAGOGASTRODUODENOSCOPY WITH ANESTHESIA;  Surgeon: Femi Goddard MD;  Location: [facility];  Service:    • HERNIA REPAIR     • TONSILLECTOMY         Family History   Problem Relation Age of Onset   • Cancer Mother    • Cancer Father        Social History     Socioeconomic History   • Marital status: Single     Spouse name: Not on file   • Number of children: Not on file   • Years of education: Not on file   • Highest education level: Not on file   Tobacco Use   • Smoking status: Current Every Day Smoker     Packs/day: 1.00     Years: 40.00     Pack years: 40.00     Types: Cigarettes   Substance and Sexual Activity   • Alcohol use: No   • Drug use: No   • Sexual activity: Defer     Comment: recieved partient from the ED dept . No history with chart            Objective   Physical Exam    Procedures           ED Course                                           MDM    Final diagnoses:   None            Marcelina Sood RN  20 1548

## 2020-07-11 NOTE — PLAN OF CARE
Problem: Patient Care Overview  Goal: Plan of Care Review  Outcome: Ongoing (interventions implemented as appropriate)  Flowsheets (Taken 7/11/2020 2930)  Progress: no change  Plan of Care Reviewed With: patient  Note:   Pt is to stay overnight for IVF and IV abx. C/O pain in abdomen.

## 2020-07-12 PROBLEM — D72.829 LEUKOCYTOSIS: Status: ACTIVE | Noted: 2020-07-12

## 2020-07-12 LAB
ALBUMIN SERPL-MCNC: 4 G/DL (ref 3.5–5.2)
ALBUMIN/GLOB SERPL: 1.5 G/DL
ALP SERPL-CCNC: 93 U/L (ref 39–117)
ALT SERPL W P-5'-P-CCNC: 18 U/L (ref 1–33)
ANION GAP SERPL CALCULATED.3IONS-SCNC: 11 MMOL/L (ref 5–15)
AST SERPL-CCNC: 14 U/L (ref 1–32)
BASOPHILS # BLD AUTO: 0.08 10*3/MM3 (ref 0–0.2)
BASOPHILS NFR BLD AUTO: 0.5 % (ref 0–1.5)
BILIRUB SERPL-MCNC: 0.6 MG/DL (ref 0–1.2)
BUN SERPL-MCNC: 11 MG/DL (ref 8–23)
BUN/CREAT SERPL: 19 (ref 7–25)
CALCIUM SPEC-SCNC: 8.4 MG/DL (ref 8.6–10.5)
CHLORIDE SERPL-SCNC: 101 MMOL/L (ref 98–107)
CO2 SERPL-SCNC: 22 MMOL/L (ref 22–29)
CREAT SERPL-MCNC: 0.58 MG/DL (ref 0.57–1)
DEPRECATED RDW RBC AUTO: 49.8 FL (ref 37–54)
EOSINOPHIL # BLD AUTO: 0.04 10*3/MM3 (ref 0–0.4)
EOSINOPHIL NFR BLD AUTO: 0.2 % (ref 0.3–6.2)
ERYTHROCYTE [DISTWIDTH] IN BLOOD BY AUTOMATED COUNT: 15.2 % (ref 12.3–15.4)
GFR SERPL CREATININE-BSD FRML MDRD: 105 ML/MIN/1.73
GLOBULIN UR ELPH-MCNC: 2.7 GM/DL
GLUCOSE SERPL-MCNC: 134 MG/DL (ref 65–99)
HCT VFR BLD AUTO: 39.9 % (ref 34–46.6)
HGB BLD-MCNC: 13.3 G/DL (ref 12–15.9)
IMM GRANULOCYTES # BLD AUTO: 0.08 10*3/MM3 (ref 0–0.05)
IMM GRANULOCYTES NFR BLD AUTO: 0.5 % (ref 0–0.5)
LYMPHOCYTES # BLD AUTO: 2.8 10*3/MM3 (ref 0.7–3.1)
LYMPHOCYTES NFR BLD AUTO: 16.3 % (ref 19.6–45.3)
MAGNESIUM SERPL-MCNC: 1.8 MG/DL (ref 1.6–2.4)
MCH RBC QN AUTO: 29.8 PG (ref 26.6–33)
MCHC RBC AUTO-ENTMCNC: 33.3 G/DL (ref 31.5–35.7)
MCV RBC AUTO: 89.3 FL (ref 79–97)
MONOCYTES # BLD AUTO: 1.64 10*3/MM3 (ref 0.1–0.9)
MONOCYTES NFR BLD AUTO: 9.5 % (ref 5–12)
NEUTROPHILS NFR BLD AUTO: 12.59 10*3/MM3 (ref 1.7–7)
NEUTROPHILS NFR BLD AUTO: 73 % (ref 42.7–76)
NRBC BLD AUTO-RTO: 0 /100 WBC (ref 0–0.2)
PLATELET # BLD AUTO: 573 10*3/MM3 (ref 140–450)
PMV BLD AUTO: 9.3 FL (ref 6–12)
POTASSIUM SERPL-SCNC: 3.7 MMOL/L (ref 3.5–5.2)
PROT SERPL-MCNC: 6.7 G/DL (ref 6–8.5)
RBC # BLD AUTO: 4.47 10*6/MM3 (ref 3.77–5.28)
SODIUM SERPL-SCNC: 134 MMOL/L (ref 136–145)
WBC # BLD AUTO: 17.23 10*3/MM3 (ref 3.4–10.8)

## 2020-07-12 PROCEDURE — 83735 ASSAY OF MAGNESIUM: CPT | Performed by: FAMILY MEDICINE

## 2020-07-12 PROCEDURE — 25810000003 SODIUM CHLORIDE 0.9 % WITH KCL 20 MEQ 20-0.9 MEQ/L-% SOLUTION: Performed by: INTERNAL MEDICINE

## 2020-07-12 PROCEDURE — 25010000002 ONDANSETRON PER 1 MG: Performed by: FAMILY MEDICINE

## 2020-07-12 PROCEDURE — 94799 UNLISTED PULMONARY SVC/PX: CPT

## 2020-07-12 PROCEDURE — G0378 HOSPITAL OBSERVATION PER HR: HCPCS

## 2020-07-12 PROCEDURE — 25810000003 SODIUM CHLORIDE 0.9 % WITH KCL 20 MEQ 20-0.9 MEQ/L-% SOLUTION: Performed by: FAMILY MEDICINE

## 2020-07-12 PROCEDURE — 85025 COMPLETE CBC W/AUTO DIFF WBC: CPT | Performed by: FAMILY MEDICINE

## 2020-07-12 PROCEDURE — 25010000002 PROMETHAZINE PER 50 MG: Performed by: FAMILY MEDICINE

## 2020-07-12 PROCEDURE — 80053 COMPREHEN METABOLIC PANEL: CPT | Performed by: FAMILY MEDICINE

## 2020-07-12 RX ORDER — BUPRENORPHINE HYDROCHLORIDE AND NALOXONE HYDROCHLORIDE DIHYDRATE 8; 2 MG/1; MG/1
1.5 TABLET SUBLINGUAL DAILY
COMMUNITY

## 2020-07-12 RX ORDER — PROMETHAZINE HYDROCHLORIDE 25 MG/ML
12.5 INJECTION, SOLUTION INTRAMUSCULAR; INTRAVENOUS ONCE
Status: COMPLETED | OUTPATIENT
Start: 2020-07-12 | End: 2020-07-12

## 2020-07-12 RX ADMIN — HYDROCODONE BITARTRATE AND ACETAMINOPHEN 1 TABLET: 10; 325 TABLET ORAL at 04:33

## 2020-07-12 RX ADMIN — POTASSIUM CHLORIDE AND SODIUM CHLORIDE 100 ML/HR: 900; 150 INJECTION, SOLUTION INTRAVENOUS at 06:56

## 2020-07-12 RX ADMIN — HYDROCODONE BITARTRATE AND ACETAMINOPHEN 1 TABLET: 10; 325 TABLET ORAL at 22:03

## 2020-07-12 RX ADMIN — ONDANSETRON HYDROCHLORIDE 4 MG: 2 SOLUTION INTRAMUSCULAR; INTRAVENOUS at 10:02

## 2020-07-12 RX ADMIN — ONDANSETRON HYDROCHLORIDE 4 MG: 2 SOLUTION INTRAMUSCULAR; INTRAVENOUS at 04:32

## 2020-07-12 RX ADMIN — HYDROCODONE BITARTRATE AND ACETAMINOPHEN 1 TABLET: 5; 325 TABLET ORAL at 16:09

## 2020-07-12 RX ADMIN — BUDESONIDE AND FORMOTEROL FUMARATE DIHYDRATE 2 PUFF: 160; 4.5 AEROSOL RESPIRATORY (INHALATION) at 07:03

## 2020-07-12 RX ADMIN — BUDESONIDE AND FORMOTEROL FUMARATE DIHYDRATE 2 PUFF: 160; 4.5 AEROSOL RESPIRATORY (INHALATION) at 19:52

## 2020-07-12 RX ADMIN — ONDANSETRON HYDROCHLORIDE 4 MG: 2 SOLUTION INTRAMUSCULAR; INTRAVENOUS at 16:09

## 2020-07-12 RX ADMIN — SODIUM CHLORIDE, PRESERVATIVE FREE 10 ML: 5 INJECTION INTRAVENOUS at 22:04

## 2020-07-12 RX ADMIN — ONDANSETRON HYDROCHLORIDE 4 MG: 2 SOLUTION INTRAMUSCULAR; INTRAVENOUS at 22:03

## 2020-07-12 RX ADMIN — POTASSIUM CHLORIDE AND SODIUM CHLORIDE 100 ML/HR: 900; 150 INJECTION, SOLUTION INTRAVENOUS at 16:09

## 2020-07-12 RX ADMIN — PROMETHAZINE HYDROCHLORIDE 12.5 MG: 25 INJECTION INTRAMUSCULAR; INTRAVENOUS at 06:56

## 2020-07-12 NOTE — PLAN OF CARE
Problem: Patient Care Overview  Goal: Plan of Care Review  Outcome: Ongoing (interventions implemented as appropriate)  Flowsheets (Taken 7/12/2020 9030)  Progress: no change  Plan of Care Reviewed With: patient  Outcome Summary: Pt c/o pain, medicated with PRN meds; nausea meds x1; up ad kosta/standby; voiding; IVF initiated; no issues at this time; will continue to monitor.

## 2020-07-12 NOTE — PLAN OF CARE
Problem: Patient Care Overview  Goal: Plan of Care Review  Outcome: Ongoing (interventions implemented as appropriate)  Flowsheets  Taken 7/12/2020 0239 by Stephanie Meza RN  Plan of Care Reviewed With: patient  Taken 7/12/2020 1535 by Maranda Monroy RN  Outcome Summary: pt states pain is better but still has severe nausea meds given as ordered sipping on clear liquids

## 2020-07-12 NOTE — PROGRESS NOTES
[facility]  INPATIENT PROGRESS NOTE    Length of Stay: 0  Date of Admission: 7/11/2020  Primary Care Physician: Jovi Arita Jr., MD    Subjective     Chief Complaint:     Nausea, vomiting, diarrhea    HPI     The patient is feeling somewhat better today.  Diarrhea has stopped.  We have not been able to get a GI panel so far as the patient has had no further stooling.  She is able to tolerate clear fluids but is anorexic.  White blood cell count has improved to 17,200.  Maximum temperature of 99.3 since admission.  Vital signs are stable.  Oxygen saturations are 99% on room air.  She continues on normal saline with 20 mEq KCl at 100 cc/h.  Hopefully she will recover enough by tomorrow to be capable of discharging home.    Review of Systems     All pertinent negatives and positives are as above. All other systems have been reviewed and are negative unless otherwise stated.     Objective    Temp:  [97.4 °F (36.3 °C)-99.3 °F (37.4 °C)] 97.4 °F (36.3 °C)  Heart Rate:  [] 75  Resp:  [12-18] 16  BP: (115-153)/(80-99) 147/84    Lab Results (last 24 hours)     Procedure Component Value Units Date/Time    Blood Culture - Blood, Arm, Right [[id number]] Collected:  07/11/20 1137    Specimen:  Blood from Arm, Right Updated:  07/12/20 1200     Blood Culture No growth at 24 hours    CBC Auto Differential [[id number]]  (Abnormal) Collected:  07/12/20 0609    Specimen:  Blood Updated:  07/12/20 0712     WBC 17.23 10*3/mm3      RBC 4.47 10*6/mm3      Hemoglobin 13.3 g/dL      Hematocrit 39.9 %      MCV 89.3 fL      MCH 29.8 pg      MCHC 33.3 g/dL      RDW 15.2 %      RDW-SD 49.8 fl      MPV 9.3 fL      Platelets 573 10*3/mm3      Neutrophil % 73.0 %      Lymphocyte % 16.3 %      Monocyte % 9.5 %      Eosinophil % 0.2 %      Basophil % 0.5 %      Immature Grans % 0.5 %      Neutrophils, Absolute 12.59 10*3/mm3      Lymphocytes, Absolute 2.80 10*3/mm3      Monocytes,  Absolute 1.64 10*3/mm3      Eosinophils, Absolute 0.04 10*3/mm3      Basophils, Absolute 0.08 10*3/mm3      Immature Grans, Absolute 0.08 10*3/mm3      nRBC 0.0 /100 WBC     Comprehensive Metabolic Panel [[id number]]  (Abnormal) Collected:  07/12/20 0609    Specimen:  Blood Updated:  07/12/20 0711     Glucose 134 mg/dL      BUN 11 mg/dL      Creatinine 0.58 mg/dL      Sodium 134 mmol/L      Potassium 3.7 mmol/L      Chloride 101 mmol/L      CO2 22.0 mmol/L      Calcium 8.4 mg/dL      Total Protein 6.7 g/dL      Albumin 4.00 g/dL      ALT (SGPT) 18 U/L      AST (SGOT) 14 U/L      Alkaline Phosphatase 93 U/L      Total Bilirubin 0.6 mg/dL      eGFR Non African Amer 105 mL/min/1.73      Globulin 2.7 gm/dL      A/G Ratio 1.5 g/dL      BUN/Creatinine Ratio 19.0     Anion Gap 11.0 mmol/L     Narrative:       GFR Normal >60  Chronic Kidney Disease <60  Kidney Failure <15      Magnesium [[id number]]  (Normal) Collected:  07/12/20 0609    Specimen:  Blood Updated:  07/12/20 0711     Magnesium 1.8 mg/dL     Urine Drug Screen - Urine, Clean Catch [[id number]]  (Abnormal) Collected:  07/11/20 1630    Specimen:  Urine, Clean Catch Updated:  07/11/20 1818     THC, Screen, Urine Positive     Phencyclidine (PCP), Urine Negative     Cocaine Screen, Urine Negative     Methamphetamine, Ur Negative     Opiate Screen Positive     Amphetamine Screen, Urine Negative     Benzodiazepine Screen, Urine Negative     Tricyclic Antidepressants Screen Negative     Methadone Screen, Urine Negative     Barbiturates Screen, Urine Negative     Oxycodone Screen, Urine Negative     Propoxyphene Screen Negative     Buprenorphine, Screen, Urine Negative    Narrative:       Cutoff For Drugs Screened:    Amphetamines               500 ng/ml  Barbiturates               200 ng/ml  Benzodiazepines            150 ng/ml  Cocaine                    150 ng/ml  Methadone                  200 ng/ml  Opiates                    100 ng/ml  Phencyclidine               25  ng/ml  THC                            50 ng/ml  Methamphetamine            500 ng/ml  Tricyclic Antidepressants  300 ng/ml  Oxycodone                  100 ng/ml  Propoxyphene               300 ng/ml  Buprenorphine               10 ng/ml    The normal value for all drugs tested is negative. This report includes unconfirmed screening results, with the cutoff values listed, to be used for medical treatment purposes only.  Unconfirmed results must not be used for non-medical purposes such as employment or legal testing.  Clinical consideration should be applied to any drug of abuse test, particularly when unconfirmed results are used.      Urinalysis With Culture If Indicated - Urine, Clean Catch [[id number]]  (Abnormal) Collected:  07/11/20 1630    Specimen:  Urine, Clean Catch Updated:  07/11/20 1652     Color, UA Yellow     Appearance, UA Clear     pH, UA 7.0     Specific Gravity, UA >1.030     Glucose, UA Negative     Ketones, UA Negative     Bilirubin, UA Negative     Blood, UA Moderate (2+)     Protein,  mg/dL (2+)     Leuk Esterase, UA Negative     Nitrite, UA Negative     Urobilinogen, UA 0.2 E.U./dL    Urinalysis, Microscopic Only - Urine, Clean Catch [[id number]]  (Abnormal) Collected:  07/11/20 1630    Specimen:  Urine, Clean Catch Updated:  07/11/20 1652     RBC, UA 31-50 /HPF      WBC, UA 0-2 /HPF      Bacteria, UA None Seen /HPF      Squamous Epithelial Cells, UA None Seen /HPF      Hyaline Casts, UA None Seen /LPF      Methodology Automated Microscopy    COVID PRE-OP / PRE-PROCEDURE SCREENING ORDER (NO ISOLATION) - Swab, Nasopharynx [[id number]] Collected:  07/11/20 1602    Specimen:  Swab from Nasopharynx Updated:  07/11/20 1648    Narrative:       The following orders were created for panel order COVID PRE-OP / PRE-PROCEDURE SCREENING ORDER (NO ISOLATION) - Swab, Nasopharynx.  Procedure                               Abnormality         Status                     ---------                                -----------         ------                     COVID-19, ABBOTT IN-HOUS...[[id number]]  Normal              Final result                 Please view results for these tests on the individual orders.    COVID-19, ABBOTT IN-HOUSE,NP Swab (NO TRANSPORT MEDIA) 2 HR TAT - Swab, Nasopharynx [[id number]]  (Normal) Collected:  07/11/20 1602    Specimen:  Swab from Nasopharynx Updated:  07/11/20 1648     COVID19 Not Detected    Narrative:       Fact sheet for providers: https://www.fda.gov/media/913648/download     Fact sheet for patients: https://www.fda.gov/media/680914/download    Troponin [[id number]]  (Normal) Collected:  07/11/20 1615    Specimen:  Blood Updated:  07/11/20 1637     Troponin T 0.022 ng/mL     Narrative:       Troponin T Reference Range:  <= 0.03 ng/mL-   Negative for AMI  >0.03 ng/mL-     Abnormal for myocardial necrosis.  Clinicians would have to utilize clinical acumen, EKG, Troponin and serial changes to determine if it is an Acute Myocardial Infarction or myocardial injury due to an underlying chronic condition.       Results may be falsely decreased if patient taking Biotin.      Troponin [[id number]]  (Normal) Collected:  07/11/20 1137    Specimen:  Blood Updated:  07/11/20 1624     Troponin T 0.022 ng/mL     Narrative:       Troponin T Reference Range:  <= 0.03 ng/mL-   Negative for AMI  >0.03 ng/mL-     Abnormal for myocardial necrosis.  Clinicians would have to utilize clinical acumen, EKG, Troponin and serial changes to determine if it is an Acute Myocardial Infarction or myocardial injury due to an underlying chronic condition.       Results may be falsely decreased if patient taking Biotin.      Lactic Acid, Reflex [[id number]]  (Normal) Collected:  07/11/20 1537    Specimen:  Blood Updated:  07/11/20 1608     Lactate 1.6 mmol/L     Lactic Acid, Reflex Timer (This will reflex a repeat order 3-3:15 hours after ordered.) [[id number]] Collected:  07/11/20 1137    Specimen:  Blood Updated:  07/11/20  1515     Hold Tube Hold for add-ons.     Comment: Auto resulted.       Blood Culture - Blood, Arm, Left [[id number]] Collected:  07/11/20 1315    Specimen:  Blood from Arm, Left Updated:  07/11/20 1404          Imaging Results (Last 24 Hours)     Procedure Component Value Units Date/Time    CT Abdomen Pelvis With Contrast [[id number]] Collected:  07/11/20 1451     Updated:  07/11/20 1502    Narrative:       EXAMINATION:   CT ABDOMEN PELVIS W CONTRAST-  7/11/2020 2:51 PM CDT     HISTORY: CT ABDOMEN AND PELVIS WITH CONTRAST 7/11/2020 2:19 PM CDT     HISTORY: Severe epigastric pain1     COMPARISON: None.      DLP: 105 mGy cm     TECHNIQUE: Following the intravenous administration of contrast, helical  CT tomographic images of the abdomen and pelvis were acquired. Coronal  reformatted images were also provided for review.      FINDINGS:   Motion is noted limiting evaluation of the lung bases there is  suggestion of a 6 mm nodule in the right lung base..      LIVER: No focal liver lesion. The hepatic vasculature is patent.      BILIARY SYSTEM: The gallbladder is unremarkable. No intrahepatic or  extrahepatic ductal dilatation.      PANCREAS: No focal pancreatic lesion.      SPLEEN: Unremarkable.      KIDNEYS AND ADRENALS: Bilateral kidneys and adrenal glands are  unremarkable. The ureters are decompressed and normal in appearance.     RETROPERITONEUM: No mass, lymphadenopathy or hemorrhage.      GI TRACT: No evidence of obstruction or bowel wall thickening. The  appendix is visualized and unremarkable.     OTHER: There is no mesenteric mass, lymphadenopathy or fluid collection.  The abdominopelvic vasculature is patent. The osseous structures and  soft tissues demonstrate no worrisome lesions. Old fracture of the right  greater trochanter is noted.      PELVIS: The uterus is visualized.. The urinary bladder is normal in  appearance.       Impression:       1. No acute intra-abdominal or pelvic abnormality.  2. Old fracture  right greater trochanter.  3. 6 mm nodule right lower lobe follow-up with CT in 3-6 months to  confirm persistence is suggested according to Fleischner Society  recommendations     Fleischner Society Recommendations for Management of PARTLY SOLID  Pulmonary nodules:     SOLITARY NODULES:  1. For partly solid nodules measuring less than 6 mm in diameter, no  follow-up is needed.  2. For partly solid nodules measuring greater than 6 mm, CT in 3 to 6  months to confirm persistence. If unchanged and a solid component is  below 6 mm, CT annually for 5 years. (Persistent, partly solid nodules  containing a solid component greater than 6 mm are highly suspicious).     MULTIPLE NODULES:  1. For multiple, partly solid nodules measuring less than 6 mm in  diameter, CT in 3 to 6 months. If unchanged, consider CT at 2 and 4  years.  2. For multiple, partly solid nodules measuring greater than 6 mm in  diameter, CT in 3 to 6 months. Then management is based on the most  suspicious nodule..         This report was finalized on 07/11/2020 14:59 by Dr. Obed Villavicencio MD.             Intake/Output Summary (Last 24 hours) at 7/12/2020 1332  Last data filed at 7/12/2020 0846  Gross per 24 hour   Intake 120 ml   Output --   Net 120 ml       Physical Exam  Constitutional: She is oriented to person, place, and time. She appears well-developed and well-nourished. She appears  ill.   HENT:   Head: Normocephalic and atraumatic.   Nose: Nose normal.   Mouth/Throat: Oropharynx is clear and moist.   Eyes: Pupils are equal, round, and reactive to light. Conjunctivae and EOM are normal. No scleral icterus.   Neck: Normal range of motion. Neck supple. No JVD present. No tracheal deviation present.   Cardiovascular: Normal rate, regular rhythm and normal heart sounds.   No murmur heard.  Pulmonary/Chest: Effort normal and breath sounds normal.   Abdominal: Soft. She exhibits no distension and no mass. Bowel sounds are increased. There is  generalized tenderness. There is no guarding.   Musculoskeletal: Normal range of motion. She exhibits no edema.   Neurological: She is alert and oriented to person, place, and time. She displays normal reflexes. No cranial nerve deficit. Coordination normal.   Skin: Skin is warm and dry.  No pallor.   Psychiatric: She has a normal mood and affect.     Results Review:  I have reviewed the labs, radiology results, and diagnostic studies since my last progress note and made treatment changes reflective of the results.   I have reviewed the current medications.    Assessment/Plan     Active Hospital Problems    Diagnosis   • **Dehydration   • Leukocytosis   • COPD (chronic obstructive pulmonary disease) (CMS/[facility])   • Enterocolitis       PLAN:  Continue IV fluids  Oral fluid intake and ambulation encouraged  GI panel pending  CBC and BMP in a.m.    Kolby Butler DO   07/12/20   13:32

## 2020-07-13 ENCOUNTER — APPOINTMENT (OUTPATIENT)
Dept: ULTRASOUND IMAGING | Facility: HOSPITAL | Age: 65
End: 2020-07-13

## 2020-07-13 ENCOUNTER — APPOINTMENT (OUTPATIENT)
Dept: CT IMAGING | Facility: HOSPITAL | Age: 65
End: 2020-07-13

## 2020-07-13 PROBLEM — R91.1 SOLITARY PULMONARY NODULE: Status: ACTIVE | Noted: 2020-07-13

## 2020-07-13 PROBLEM — Z72.0 TOBACCO ABUSE: Status: ACTIVE | Noted: 2020-07-13

## 2020-07-13 PROBLEM — A08.4 VIRAL GASTROENTERITIS: Status: ACTIVE | Noted: 2020-07-11

## 2020-07-13 PROBLEM — E87.20 LACTIC ACIDOSIS: Status: ACTIVE | Noted: 2020-07-13

## 2020-07-13 PROBLEM — G89.4 CHRONIC PAIN SYNDROME: Status: ACTIVE | Noted: 2020-07-13

## 2020-07-13 LAB
ANION GAP SERPL CALCULATED.3IONS-SCNC: 12 MMOL/L (ref 5–15)
BASOPHILS # BLD AUTO: 0.09 10*3/MM3 (ref 0–0.2)
BASOPHILS NFR BLD AUTO: 0.6 % (ref 0–1.5)
BUN SERPL-MCNC: 8 MG/DL (ref 8–23)
BUN/CREAT SERPL: 15.1 (ref 7–25)
CALCIUM SPEC-SCNC: 8.6 MG/DL (ref 8.6–10.5)
CHLORIDE SERPL-SCNC: 98 MMOL/L (ref 98–107)
CO2 SERPL-SCNC: 24 MMOL/L (ref 22–29)
CREAT SERPL-MCNC: 0.53 MG/DL (ref 0.57–1)
DEPRECATED RDW RBC AUTO: 48.3 FL (ref 37–54)
EOSINOPHIL # BLD AUTO: 0.04 10*3/MM3 (ref 0–0.4)
EOSINOPHIL NFR BLD AUTO: 0.3 % (ref 0.3–6.2)
ERYTHROCYTE [DISTWIDTH] IN BLOOD BY AUTOMATED COUNT: 14.8 % (ref 12.3–15.4)
GFR SERPL CREATININE-BSD FRML MDRD: 116 ML/MIN/1.73
GLUCOSE SERPL-MCNC: 143 MG/DL (ref 65–99)
HCT VFR BLD AUTO: 42.1 % (ref 34–46.6)
HGB BLD-MCNC: 14.1 G/DL (ref 12–15.9)
IMM GRANULOCYTES # BLD AUTO: 0.06 10*3/MM3 (ref 0–0.05)
IMM GRANULOCYTES NFR BLD AUTO: 0.4 % (ref 0–0.5)
LYMPHOCYTES # BLD AUTO: 2.91 10*3/MM3 (ref 0.7–3.1)
LYMPHOCYTES NFR BLD AUTO: 20.7 % (ref 19.6–45.3)
MCH RBC QN AUTO: 29.9 PG (ref 26.6–33)
MCHC RBC AUTO-ENTMCNC: 33.5 G/DL (ref 31.5–35.7)
MCV RBC AUTO: 89.2 FL (ref 79–97)
MONOCYTES # BLD AUTO: 1.36 10*3/MM3 (ref 0.1–0.9)
MONOCYTES NFR BLD AUTO: 9.7 % (ref 5–12)
NEUTROPHILS NFR BLD AUTO: 68.3 % (ref 42.7–76)
NEUTROPHILS NFR BLD AUTO: 9.57 10*3/MM3 (ref 1.7–7)
NRBC BLD AUTO-RTO: 0 /100 WBC (ref 0–0.2)
PLATELET # BLD AUTO: 564 10*3/MM3 (ref 140–450)
PMV BLD AUTO: 9.2 FL (ref 6–12)
POTASSIUM SERPL-SCNC: 3.7 MMOL/L (ref 3.5–5.2)
RBC # BLD AUTO: 4.72 10*6/MM3 (ref 3.77–5.28)
SODIUM SERPL-SCNC: 134 MMOL/L (ref 136–145)
WBC # BLD AUTO: 14.03 10*3/MM3 (ref 3.4–10.8)

## 2020-07-13 PROCEDURE — 25010000002 PROMETHAZINE PER 50 MG: Performed by: FAMILY MEDICINE

## 2020-07-13 PROCEDURE — 99406 BEHAV CHNG SMOKING 3-10 MIN: CPT

## 2020-07-13 PROCEDURE — 94799 UNLISTED PULMONARY SVC/PX: CPT

## 2020-07-13 PROCEDURE — 85025 COMPLETE CBC W/AUTO DIFF WBC: CPT | Performed by: FAMILY MEDICINE

## 2020-07-13 PROCEDURE — G0378 HOSPITAL OBSERVATION PER HR: HCPCS

## 2020-07-13 PROCEDURE — 76705 ECHO EXAM OF ABDOMEN: CPT

## 2020-07-13 PROCEDURE — 0 IOPAMIDOL PER 1 ML: Performed by: INTERNAL MEDICINE

## 2020-07-13 PROCEDURE — 74174 CTA ABD&PLVS W/CONTRAST: CPT

## 2020-07-13 PROCEDURE — 94664 DEMO&/EVAL PT USE INHALER: CPT

## 2020-07-13 PROCEDURE — 25010000002 ONDANSETRON PER 1 MG: Performed by: FAMILY MEDICINE

## 2020-07-13 PROCEDURE — 80048 BASIC METABOLIC PNL TOTAL CA: CPT | Performed by: FAMILY MEDICINE

## 2020-07-13 RX ORDER — PROMETHAZINE HYDROCHLORIDE 25 MG/ML
12.5 INJECTION, SOLUTION INTRAMUSCULAR; INTRAVENOUS ONCE
Status: COMPLETED | OUTPATIENT
Start: 2020-07-13 | End: 2020-07-13

## 2020-07-13 RX ORDER — NICOTINE 21 MG/24HR
1 PATCH, TRANSDERMAL 24 HOURS TRANSDERMAL
Status: DISCONTINUED | OUTPATIENT
Start: 2020-07-13 | End: 2020-07-16 | Stop reason: HOSPADM

## 2020-07-13 RX ORDER — FAMOTIDINE 10 MG/ML
20 INJECTION, SOLUTION INTRAVENOUS EVERY 12 HOURS SCHEDULED
Status: DISCONTINUED | OUTPATIENT
Start: 2020-07-13 | End: 2020-07-16 | Stop reason: HOSPADM

## 2020-07-13 RX ADMIN — HYDROCODONE BITARTRATE AND ACETAMINOPHEN 1 TABLET: 10; 325 TABLET ORAL at 19:15

## 2020-07-13 RX ADMIN — HYDROCODONE BITARTRATE AND ACETAMINOPHEN 1 TABLET: 10; 325 TABLET ORAL at 10:38

## 2020-07-13 RX ADMIN — ONDANSETRON HYDROCHLORIDE 4 MG: 2 SOLUTION INTRAMUSCULAR; INTRAVENOUS at 04:20

## 2020-07-13 RX ADMIN — ONDANSETRON HYDROCHLORIDE 4 MG: 2 SOLUTION INTRAMUSCULAR; INTRAVENOUS at 10:34

## 2020-07-13 RX ADMIN — IOPAMIDOL 89 ML: 755 INJECTION, SOLUTION INTRAVENOUS at 10:18

## 2020-07-13 RX ADMIN — BUDESONIDE AND FORMOTEROL FUMARATE DIHYDRATE 2 PUFF: 160; 4.5 AEROSOL RESPIRATORY (INHALATION) at 20:24

## 2020-07-13 RX ADMIN — NICOTINE 1 PATCH: 14 PATCH, EXTENDED RELEASE TRANSDERMAL at 10:38

## 2020-07-13 RX ADMIN — FAMOTIDINE 20 MG: 10 INJECTION, SOLUTION INTRAVENOUS at 20:45

## 2020-07-13 RX ADMIN — PROMETHAZINE HYDROCHLORIDE 12.5 MG: 25 INJECTION INTRAMUSCULAR; INTRAVENOUS at 06:36

## 2020-07-13 RX ADMIN — FAMOTIDINE 20 MG: 10 INJECTION, SOLUTION INTRAVENOUS at 10:39

## 2020-07-13 RX ADMIN — HYDROCODONE BITARTRATE AND ACETAMINOPHEN 1 TABLET: 10; 325 TABLET ORAL at 04:26

## 2020-07-13 RX ADMIN — BUDESONIDE AND FORMOTEROL FUMARATE DIHYDRATE 2 PUFF: 160; 4.5 AEROSOL RESPIRATORY (INHALATION) at 07:01

## 2020-07-13 NOTE — PLAN OF CARE
Problem: Patient Care Overview  Goal: Plan of Care Review  Outcome: Ongoing (interventions implemented as appropriate)  Flowsheets (Taken 7/13/2020 8927)  Progress: improving  Plan of Care Reviewed With: patient  Outcome Summary: Pt reports to this RD that she feels a little better today and she has been able to tolerate some of her clear liquid diet today. She does not report recent weight loss that she is aware of, but she does not weigh herself at home. She did lost 13# back in December and has never been able to gain that weight back. She reports that she uses Boost supplements at home. Advised pt that once she goes home, to resume nutrition supplements as tolerated. She is underweight with a BMI of 15.98. Malnutrition assessment completed and sent to MD. She has been on a clear liquids x 3 days. Will follow.      Chronic gastritis without bleeding, unspecified gastritis type

## 2020-07-13 NOTE — PLAN OF CARE
Problem: Patient Care Overview  Goal: Plan of Care Review  7/13/2020 0325 by Stephanie Meza, RN  Flowsheets (Taken 7/13/2020 0322)  Outcome Summary: Pt c/o pain and nausea, medicated x1 for each so far this shift; IVF continue; tolerating clears; up ad kosta; voiding; still no BMs since admit; will continue to monitor.

## 2020-07-13 NOTE — PAYOR COMM NOTE
"ADMIT INPT 7-11-20    PLEASE NOTE:  NOT LATE NOTIFICATION DUE TO WEEKEND    UR  [phone]    Nickolas Gil (64 y.o. Female)     Date of Birth Social Security Number Address Home Phone MRN    [id number]  [id number] [facility]  [address]  [place] [address] [phone] [id number]    Oriental orthodox Marital Status          Non-Spiritism Single       Admission Date Admission Type Admitting Provider Attending Provider Department, Room/Bed    7/11/20 Emergency Matias Espino DO Hancock, John C, DO [facility] 3C, 379/1    Discharge Date Discharge Disposition Discharge Destination                       Attending Provider:  Matias Espino DO    Allergies:  No Known Allergies    Isolation:  None   Infection:  Tuberculosis (rule out) (11/23/18)   Code Status:  CPR    Ht:  165.1 cm (65\")   Wt:  43.5 kg (96 lb)    Admission Cmt:  None   Principal Problem:  Dehydration [E86.0]                 Active Insurance as of 7/11/2020     Primary Coverage     Payor Plan Insurance Group Employer/Plan Group    [employer] [place] [employer] [place]ICA[place]      Payor Plan Address Payor Plan Phone Number Payor Plan Fax Number Effective Dates    [address] [address] [phone]  3/21/2019 - None Entered    [facility] [address]       Subscriber Name Subscriber Birth Date Member ID       NICKOLAS GIL [id number]                 Emergency Contacts      (Rel.) Home Phone Work Phone Mobile Phone    Eva Hodge (Son) [phone] -- --    Marisol Lucas (Friend) -- -- [phone]    Oumar Linder (Friend) [phone] -- --               History & Physical      Kolby Butler DO at 07/11/20 1817              [facility] Medicine Services  HISTORY AND PHYSICAL    Date of Admission: 7/11/2020  Primary Care Physician: Jovi Arita Jr., MD    Subjective     Chief Complaint:   Nausea, vomiting, diarrhea    History of Present Illness    This 64-year-old white female was " "admitted with a chief complaint of nausea, vomiting and diarrhea.  The patient's symptoms have been present for the past 3 days.  She states that she has had vomiting and diarrhea at least 20 times over that period.  Patient has had no nausea or vomiting today but feels \"terrible\".  She presented to the emergency department for further evaluation and treatment.    Urine drug screen is positive for THC and opiates.  Specific gravity is greater than 1.030 with 31-50 RBCs and no bacteria.  Troponin and COVID-19 screening negative.  Lactate elevated at 3.0.  Glucose 178, sodium 134, white blood cell count 22,100 (likely reactive).  Lipase is negative.  CT scan of the abdomen pelvis shows no acute intra-abdominal pathology.    Review of Systems   Constitutional: Positive for appetite change.   HENT: Negative.    Eyes: Negative.    Respiratory: Negative.    Cardiovascular: Negative.    Gastrointestinal: Positive for abdominal pain, diarrhea and vomiting.   Endocrine: Negative.    Genitourinary: Negative.    Musculoskeletal: Negative.    Allergic/Immunologic: Negative.    Neurological: Positive for weakness.   Hematological: Negative.    Psychiatric/Behavioral: Negative.         Otherwise complete ROS reviewed and negative except as mentioned in the HPI.      Past Medical History:     Past Medical History:   Diagnosis Date   • Arthritis    • COPD (chronic obstructive pulmonary disease) (CMS/HCC)    • GERD (gastroesophageal reflux disease)        Past Surgical History:  Past Surgical History:   Procedure Laterality Date   • ABDOMINAL SURGERY     •  SECTION     • ENDOSCOPY N/A 10/20/2017    Procedure: ESOPHAGOGASTRODUODENOSCOPY WITH ANESTHESIA;  Surgeon: Femi Godadrd MD;  Location: [facility];  Service:    • HERNIA REPAIR     • TONSILLECTOMY         Family History:   family history includes Cancer in her father and mother.    Social History:    reports that she has been smoking cigarettes. She has a 40.00 pack-year " "smoking history. She has never used smokeless tobacco. She reports that she does not drink alcohol or use drugs.    Medications:  Prior to Admission medications    Medication Sig Start Date End Date Taking? Authorizing Provider   albuterol sulfate  (90 Base) MCG/ACT inhaler Inhale 2 puffs by mouth Every 4 (Four) Hours As Needed for Wheezing. 9/7/19   Ebony Chen PA   azithromycin (ZITHROMAX) 250 MG tablet Take 1 tablet daily for 7 days 9/7/19   Ebony Chen PA   budesonide-formoterol (SYMBICORT) 160-4.5 MCG/ACT inhaler Inhale 2 puffs 2 (Two) Times a Day. 9/7/19   Ebony Chen PA   methylPREDNISolone (MEDROL, ADIEL,) 4 MG tablet Take as directed on package instructions. 9/7/19   Ebony Chen PA       Allergies:  No Known Allergies    Objective     Vital Signs:   /91   Pulse 98   Temp 98.3 °F (36.8 °C) (Oral)   Resp 18   Ht 165.1 cm (65\")   Wt 43.5 kg (96 lb)   SpO2 98%   BMI 15.98 kg/m²      Physical Exam   Constitutional: She is oriented to person, place, and time. She appears well-developed and well-nourished. She appears distressed.   HENT:   Head: Normocephalic and atraumatic.   Right Ear: External ear normal.   Left Ear: External ear normal.   Nose: Nose normal.   Mouth/Throat: Oropharynx is clear and moist.   Eyes: Pupils are equal, round, and reactive to light. Conjunctivae and EOM are normal. No scleral icterus.   Neck: Normal range of motion. Neck supple. No JVD present. No tracheal deviation present.   Cardiovascular: Normal rate, regular rhythm and normal heart sounds.   No murmur heard.  Pulmonary/Chest: Effort normal and breath sounds normal.   Abdominal: Soft. She exhibits no distension and no mass. Bowel sounds are increased. There is generalized tenderness. There is no guarding.   Musculoskeletal: Normal range of motion. She exhibits no edema.   Neurological: She is alert and oriented to person, place, and time. She displays normal " reflexes. No cranial nerve deficit. Coordination normal.   Skin: Skin is warm and dry. Capillary refill takes less than 2 seconds. No pallor.   Psychiatric: She has a normal mood and affect.         Results Reviewed:  Lab Results (last 24 hours)     Procedure Component Value Units Date/Time    Urine Drug Screen - Urine, Clean Catch [[id number]] Collected:  07/11/20 1630    Specimen:  Urine, Clean Catch Updated:  07/11/20 1807    Urinalysis With Culture If Indicated - Urine, Clean Catch [[id number]]  (Abnormal) Collected:  07/11/20 1630    Specimen:  Urine, Clean Catch Updated:  07/11/20 1652     Color, UA Yellow     Appearance, UA Clear     pH, UA 7.0     Specific Gravity, UA >1.030     Glucose, UA Negative     Ketones, UA Negative     Bilirubin, UA Negative     Blood, UA Moderate (2+)     Protein,  mg/dL (2+)     Leuk Esterase, UA Negative     Nitrite, UA Negative     Urobilinogen, UA 0.2 E.U./dL    Urinalysis, Microscopic Only - Urine, Clean Catch [[id number]]  (Abnormal) Collected:  07/11/20 1630    Specimen:  Urine, Clean Catch Updated:  07/11/20 1652     RBC, UA 31-50 /HPF      WBC, UA 0-2 /HPF      Bacteria, UA None Seen /HPF      Squamous Epithelial Cells, UA None Seen /HPF      Hyaline Casts, UA None Seen /LPF      Methodology Automated Microscopy    COVID PRE-OP / PRE-PROCEDURE SCREENING ORDER (NO ISOLATION) - Swab, Nasopharynx [[id number]] Collected:  07/11/20 1602    Specimen:  Swab from Nasopharynx Updated:  07/11/20 1648    Narrative:       The following orders were created for panel order COVID PRE-OP / PRE-PROCEDURE SCREENING ORDER (NO ISOLATION) - Swab, Nasopharynx.  Procedure                               Abnormality         Status                     ---------                               -----------         ------                     COVID-19, ABBOTT IN-HOUS...[[id number]]  Normal              Final result                 Please view results for these tests on the individual orders.    COVID-19,  ROSANGELA IN-HOUSE,NP Swab (NO TRANSPORT MEDIA) 2 HR TAT - Swab, Nasopharynx [[id number]]  (Normal) Collected:  07/11/20 1602    Specimen:  Swab from Nasopharynx Updated:  07/11/20 1648     COVID19 Not Detected    Narrative:       Fact sheet for providers: https://www.fda.gov/media/425222/download     Fact sheet for patients: https://www.fda.gov/media/530438/download    Troponin [[id number]]  (Normal) Collected:  07/11/20 1615    Specimen:  Blood Updated:  07/11/20 1637     Troponin T 0.022 ng/mL     Narrative:       Troponin T Reference Range:  <= 0.03 ng/mL-   Negative for AMI  >0.03 ng/mL-     Abnormal for myocardial necrosis.  Clinicians would have to utilize clinical acumen, EKG, Troponin and serial changes to determine if it is an Acute Myocardial Infarction or myocardial injury due to an underlying chronic condition.       Results may be falsely decreased if patient taking Biotin.      Troponin [[id number]]  (Normal) Collected:  07/11/20 1137    Specimen:  Blood Updated:  07/11/20 1624     Troponin T 0.022 ng/mL     Narrative:       Troponin T Reference Range:  <= 0.03 ng/mL-   Negative for AMI  >0.03 ng/mL-     Abnormal for myocardial necrosis.  Clinicians would have to utilize clinical acumen, EKG, Troponin and serial changes to determine if it is an Acute Myocardial Infarction or myocardial injury due to an underlying chronic condition.       Results may be falsely decreased if patient taking Biotin.      Lactic Acid, Reflex [[id number]]  (Normal) Collected:  07/11/20 1537    Specimen:  Blood Updated:  07/11/20 1608     Lactate 1.6 mmol/L     Lactic Acid, Reflex Timer (This will reflex a repeat order 3-3:15 hours after ordered.) [[id number]] Collected:  07/11/20 1137    Specimen:  Blood Updated:  07/11/20 1515     Hold Tube Hold for add-ons.     Comment: Auto resulted.       Blood Culture - Blood, Arm, Left [[id number]] Collected:  07/11/20 1315    Specimen:  Blood from Arm, Left Updated:  07/11/20 1405    Lipase  [[id number]]  (Normal) Collected:  07/11/20 1137    Specimen:  Blood Updated:  07/11/20 1313     Lipase 15 U/L     Crum Draw [[id number]] Collected:  07/11/20 1137    Specimen:  Blood Updated:  07/11/20 1245    Narrative:       The following orders were created for panel order Crum Draw.  Procedure                               Abnormality         Status                     ---------                               -----------         ------                     Light Blue Top[[id number]]                                   Final result               Green Top (Gel)[[id number]]                                  Final result               Lavender Top[[id number]]                                     Final result               Red Top[[id number]]                                          Final result                 Please view results for these tests on the individual orders.    Light Blue Top [[id number]] Collected:  07/11/20 1137    Specimen:  Blood Updated:  07/11/20 1245     Extra Tube hold for add-on     Comment: Auto resulted       Green Top (Gel) [[id number]] Collected:  07/11/20 1137    Specimen:  Blood Updated:  07/11/20 1245     Extra Tube Hold for add-ons.     Comment: Auto resulted.       Lavender Top [[id number]] Collected:  07/11/20 1137    Specimen:  Blood Updated:  07/11/20 1245     Extra Tube hold for add-on     Comment: Auto resulted       Red Top [[id number]] Collected:  07/11/20 1137    Specimen:  Blood Updated:  07/11/20 1245     Extra Tube Hold for add-ons.     Comment: Auto resulted.       Lactic Acid, Plasma [[id number]]  (Abnormal) Collected:  07/11/20 1137    Specimen:  Blood Updated:  07/11/20 1211     Lactate 3.0 mmol/L     Comprehensive Metabolic Panel [[id number]]  (Abnormal) Collected:  07/11/20 1137    Specimen:  Blood Updated:  07/11/20 1209     Glucose 178 mg/dL      BUN 21 mg/dL      Creatinine 0.65 mg/dL      Sodium 134 mmol/L      Potassium 3.6 mmol/L      Chloride 94 mmol/L      CO2 23.0  mmol/L      Calcium 10.3 mg/dL      Total Protein 8.4 g/dL      Albumin 4.90 g/dL      ALT (SGPT) 30 U/L      AST (SGOT) 22 U/L      Alkaline Phosphatase 132 U/L      Total Bilirubin 0.6 mg/dL      eGFR Non African Amer 92 mL/min/1.73      Globulin 3.5 gm/dL      A/G Ratio 1.4 g/dL      BUN/Creatinine Ratio 32.3     Anion Gap 17.0 mmol/L     Narrative:       GFR Normal >60  Chronic Kidney Disease <60  Kidney Failure <15      Blood Culture - Blood, Arm, Right [[id number]] Collected:  07/11/20 1137    Specimen:  Blood from Arm, Right Updated:  07/11/20 1156    CBC & Differential [[id number]] Collected:  07/11/20 1137    Specimen:  Blood Updated:  07/11/20 1154    Narrative:       The following orders were created for panel order CBC & Differential.  Procedure                               Abnormality         Status                     ---------                               -----------         ------                     CBC Auto Differential[[id number]]        Abnormal            Final result                 Please view results for these tests on the individual orders.    CBC Auto Differential [[id number]]  (Abnormal) Collected:  07/11/20 1137    Specimen:  Blood Updated:  07/11/20 1154     WBC 22.10 10*3/mm3      RBC 5.19 10*6/mm3      Hemoglobin 15.9 g/dL      Hematocrit 45.9 %      MCV 88.4 fL      MCH 30.6 pg      MCHC 34.6 g/dL      RDW 15.6 %      RDW-SD 50.2 fl      MPV 8.9 fL      Platelets 722 10*3/mm3      Neutrophil % 82.4 %      Lymphocyte % 10.3 %      Monocyte % 6.1 %      Eosinophil % 0.1 %      Basophil % 0.5 %      Immature Grans % 0.6 %      Neutrophils, Absolute 18.23 10*3/mm3      Lymphocytes, Absolute 2.27 10*3/mm3      Monocytes, Absolute 1.34 10*3/mm3      Eosinophils, Absolute 0.02 10*3/mm3      Basophils, Absolute 0.10 10*3/mm3      Immature Grans, Absolute 0.14 10*3/mm3      nRBC 0.0 /100 WBC           Ct Abdomen Pelvis With Contrast    Result Date: 7/11/2020  Narrative: EXAMINATION:   CT ABDOMEN  PELVIS W CONTRAST-  7/11/2020 2:51 PM CDT  HISTORY: CT ABDOMEN AND PELVIS WITH CONTRAST 7/11/2020 2:19 PM CDT  HISTORY: Severe epigastric pain1  COMPARISON: None.  DLP: 105 mGy cm  TECHNIQUE: Following the intravenous administration of contrast, helical CT tomographic images of the abdomen and pelvis were acquired. Coronal reformatted images were also provided for review.  FINDINGS: Motion is noted limiting evaluation of the lung bases there is suggestion of a 6 mm nodule in the right lung base..  LIVER: No focal liver lesion. The hepatic vasculature is patent.  BILIARY SYSTEM: The gallbladder is unremarkable. No intrahepatic or extrahepatic ductal dilatation.  PANCREAS: No focal pancreatic lesion.  SPLEEN: Unremarkable.  KIDNEYS AND ADRENALS: Bilateral kidneys and adrenal glands are unremarkable. The ureters are decompressed and normal in appearance.  RETROPERITONEUM: No mass, lymphadenopathy or hemorrhage.  GI TRACT: No evidence of obstruction or bowel wall thickening. The appendix is visualized and unremarkable.  OTHER: There is no mesenteric mass, lymphadenopathy or fluid collection. The abdominopelvic vasculature is patent. The osseous structures and soft tissues demonstrate no worrisome lesions. Old fracture of the right greater trochanter is noted.  PELVIS: The uterus is visualized.. The urinary bladder is normal in appearance.      Impression: 1. No acute intra-abdominal or pelvic abnormality. 2. Old fracture right greater trochanter. 3. 6 mm nodule right lower lobe follow-up with CT in 3-6 months to confirm persistence is suggested according to Fleischner Society recommendations  Fleischner Society Recommendations for Management of PARTLY SOLID Pulmonary nodules:  SOLITARY NODULES: 1. For partly solid nodules measuring less than 6 mm in diameter, no follow-up is needed. 2. For partly solid nodules measuring greater than 6 mm, CT in 3 to 6 months to confirm persistence. If unchanged and a solid component is  below 6 mm, CT annually for 5 years. (Persistent, partly solid nodules containing a solid component greater than 6 mm are highly suspicious).  MULTIPLE NODULES: 1. For multiple, partly solid nodules measuring less than 6 mm in diameter, CT in 3 to 6 months. If unchanged, consider CT at 2 and 4 years. 2. For multiple, partly solid nodules measuring greater than 6 mm in diameter, CT in 3 to 6 months. Then management is based on the most suspicious nodule..   This report was finalized on 2020 14:59 by Dr. Obed Villavicencio MD.     I have personally reviewed and interpreted the radiology studies and ECG obtained at time of admission.     Assessment / Plan      Assessment & Plan  Active Hospital Problems    Diagnosis   • **Dehydration   • COPD (chronic obstructive pulmonary disease) (CMS/HCC)   • Enterocolitis       PLAN:   Admit to the medical surgical floor  Normal saline 1 L bolus x1 followed by normal saline with 20 mEq KCl at 100 cc/h  Recheck BMP and CBC in a.m.  GI panel    Code Status:   Full code     I discussed the patient's findings and my recommendations with: The patient    Estimated length of stay: 1 day    Kolby Butler DO   20   18:17                Electronically signed by Kolby Butler DO at 20 1823          Emergency Department Notes      Marcelina Sood, RN at 20 1537          Subjective   History of Present Illness    Review of Systems    Past Medical History:   Diagnosis Date   • Arthritis    • COPD (chronic obstructive pulmonary disease) (CMS/HCC)    • GERD (gastroesophageal reflux disease)        No Known Allergies    Past Surgical History:   Procedure Laterality Date   • ABDOMINAL SURGERY     •  SECTION     • ENDOSCOPY N/A 10/20/2017    Procedure: ESOPHAGOGASTRODUODENOSCOPY WITH ANESTHESIA;  Surgeon: Femi Goddard MD;  Location: [facility] OR;  Service:    • HERNIA REPAIR     • TONSILLECTOMY         Family History   Problem Relation Age of Onset   • Cancer  "Mother    • Cancer Father        Social History     Socioeconomic History   • Marital status: Single     Spouse name: Not on file   • Number of children: Not on file   • Years of education: Not on file   • Highest education level: Not on file   Tobacco Use   • Smoking status: Current Every Day Smoker     Packs/day: 1.00     Years: 40.00     Pack years: 40.00     Types: Cigarettes   Substance and Sexual Activity   • Alcohol use: No   • Drug use: No   • Sexual activity: Defer     Comment: recieved partient from the ED dept . No history with chart            Objective   Physical Exam    Procedures          ED Course                                           MDM    Final diagnoses:   None            Marcelina Sood RN  20 1544      Electronically signed by Marcelina Sood RN at 20 1544     Stephen Jackson MD at 20 1724          Subjective   This patient is a 64-year-old female who for the past 3 days or so has had severe nausea and vomiting resulting in approximately 20 episodes of nonbloody vomitus.  She is also had multiple episodes of diarrhea.  Both onset of symptoms of a eased somewhat today and she has had no vomiting and the diarrhea has eased significantly.  She presents to the ER because she just feels \"terrible\" and has severe epigastric pain to the point where even when she takes a deep breath she feels significant epigastric pain.  She is had no chest pain or shortness of breath per se.          Review of Systems   Gastrointestinal: Positive for abdominal pain, diarrhea, nausea and vomiting.       Past Medical History:   Diagnosis Date   • Arthritis    • COPD (chronic obstructive pulmonary disease) (CMS/HCC)    • GERD (gastroesophageal reflux disease)        No Known Allergies    Past Surgical History:   Procedure Laterality Date   • ABDOMINAL SURGERY     •  SECTION     • ENDOSCOPY N/A 10/20/2017    Procedure: ESOPHAGOGASTRODUODENOSCOPY WITH ANESTHESIA;  Surgeon: Femi TRAVIS" MD Nitza;  Location: [facility] OR;  Service:    • HERNIA REPAIR     • TONSILLECTOMY         Family History   Problem Relation Age of Onset   • Cancer Mother    • Cancer Father        Social History     Socioeconomic History   • Marital status: Single     Spouse name: Not on file   • Number of children: Not on file   • Years of education: Not on file   • Highest education level: Not on file   Tobacco Use   • Smoking status: Current Every Day Smoker     Packs/day: 1.00     Years: 40.00     Pack years: 40.00     Types: Cigarettes   Substance and Sexual Activity   • Alcohol use: No   • Drug use: No   • Sexual activity: Defer     Comment: recieved partient from the ED dept . No history with chart            Objective   Physical Exam   Constitutional: She is oriented to person, place, and time. She appears well-developed and well-nourished.   HENT:   Head: Normocephalic and atraumatic.   Mouth/Throat: Oropharynx is clear and moist.   Eyes: Conjunctivae and EOM are normal.   Neck: Normal range of motion. Neck supple.   Cardiovascular: Normal rate, regular rhythm, normal heart sounds and intact distal pulses.   Pulmonary/Chest: Effort normal and breath sounds normal.   Abdominal: Soft. Bowel sounds are normal. There is tenderness in the epigastric area. There is no rigidity, no rebound and no guarding.   Musculoskeletal: Normal range of motion.   Neurological: She is alert and oriented to person, place, and time.   Skin: Skin is warm and dry. Capillary refill takes less than 2 seconds.   Psychiatric: She has a normal mood and affect. Her behavior is normal. Judgment and thought content normal.   Nursing note and vitals reviewed.      Procedures          ED Course                                           MDM  Number of Diagnoses or Management Options     Amount and/or Complexity of Data Reviewed  Clinical lab tests: reviewed and ordered  Tests in the radiology section of CPT®:  reviewed and ordered        Final diagnoses:      Dehydration   Gastroenteritis     The patient's work-up was significant for initially elevated lactate and a markedly elevated white blood cell count.  Her CT scan was otherwise reassuring.  Apart from those numbers there is really no compelling reason to admit the patient otherwise.  I discussed the case with the patient however and she just still feels terrible even not tried for quite some hours to turn around.  Her social context is important as well and that she lives alone.  I discussed the case with Dr. Hansen and he kindly agreed to admit the patient overnight for IV fluids and to make sure she is heading in the right direction and feeling better.       Stephen Jackson MD  07/11/20 1738      Electronically signed by Stephen Jackson MD at 07/11/20 1738       Vital Signs (last 2 days)     Date/Time   Temp   Temp src   Pulse   Resp   BP   Patient Position   SpO2    07/12/20 2011   99.7 (37.6)   Oral   87   16   140/94   Lying   97    07/12/20 1952   --   --   82   16   --   --   99    07/12/20 1532   97.8 (36.6)   Oral   81   16   142/86   Lying   98    07/12/20 1204   97.4 (36.3)   Oral   75   16   147/84   Lying   99    07/12/20 0846   98.4 (36.9)   Oral   80   16   151/87   Lying   97    07/12/20 0703   --   --   84   16   --   Lying   98    07/12/20 0448   98.5 (36.9)   Oral   79   16   140/87   Lying   98    07/11/20 2354   99.3 (37.4)   Oral   102   18   115/80   Lying   96    07/11/20 2117   --   --   87   12   --   --   96    07/11/20 1859   99.2 (37.3) nurse notified   Oral   87   12   142/93 nurse notified   Lying   95    Temp: nurse notified at 07/11/20 1859    BP: nurse notified at 07/11/20 1859 07/11/20 1645   --   --   --   --   142/91   --   --    07/11/20 1615   --   --   98   18   145/99   --   98    07/11/20 1400   --   --   89   17   153/97   --   98    07/11/20 1330   --   --   82   19   157/86   --   97    07/11/20 1127   98.3 (36.8)   Oral   102   24   (!) 128/107    Sitting   99            Oxygen Therapy (last 2 days)     Date/Time   SpO2   Device (Oxygen Therapy)   Flow (L/min)   Oxygen Concentration (%)   ETCO2 (mmHg)    07/12/20 2011 97   room air   --   --   --    07/12/20 1952   99   room air   --   --   --    07/12/20 1532   98   room air   --   --   --    07/12/20 1204   99   room air   --   --   --    07/12/20 0846   97   room air   --   --   --    07/12/20 0703   98   room air   --   --   --    07/12/20 0448   98   room air   --   --   --    07/11/20 2354   96   room air   --   --   --    07/11/20 2117   96   room air   --   --   --    07/11/20 1859   95   room air   --   --   --    07/11/20 1615   98   --   --   --   --    07/11/20 1400   98   --   --   --   --    07/11/20 1330   97   --   --   --   --    07/11/20 1127   99   --   --   --   --            Intake & Output (last 2 days)       07/11 0701 - 07/12 0700 07/12 0701 - 07/13 0700    P.O.  360    Total Intake(mL/kg)  360 (8.3)    Net  +360          Urine Unmeasured Occurrence  5 x        Lines, Drains & Airways    Active LDAs     Name:   Placement date:   Placement time:   Site:   Days:    Peripheral IV 07/11/20 1312 Left Forearm   07/11/20    1312    Forearm   1         Inactive LDAs     None                  Facility-Administered Medications as of 7/13/2020   Medication Dose Route Frequency Provider Last Rate Last Dose   • acetaminophen (TYLENOL) tablet 650 mg  650 mg Oral Q4H PRN Kolby Butler DO       • albuterol (PROVENTIL) nebulizer solution 0.083% 2.5 mg/3mL  2.5 mg Nebulization Q4H PRN Jenaro Hansen MD       • [COMPLETED] aluminum-magnesium hydroxide-simethicone (MAALOX MAX) 400-400-40 MG/5ML suspension 15 mL  15 mL Oral Once Stephen Jackson MD   15 mL at 07/11/20 1538   • aluminum-magnesium hydroxide-simethicone (MAALOX MAX) 400-400-40 MG/5ML suspension 15 mL  15 mL Oral Q6H PRN Kolby Butler, DO       • budesonide-formoterol (SYMBICORT) 160-4.5 MCG/ACT inhaler 2 puff   2 puff Inhalation BID - RT Kolby Butler DO   2 puff at 07/12/20 1952   • [COMPLETED] famotidine (PEPCID) injection 20 mg  20 mg Intravenous Once Stephen Jackson MD   20 mg at 07/11/20 1539   • HYDROcodone-acetaminophen (NORCO)  MG per tablet 1 tablet  1 tablet Oral Q4H PRN Kolby Butler DO   1 tablet at 07/13/20 0426   • HYDROcodone-acetaminophen (NORCO) 5-325 MG per tablet 1 tablet  1 tablet Oral Q4H PRN Kolby Butler DO   1 tablet at 07/12/20 1609   • [COMPLETED] iopamidol (ISOVUE-300) 61 % injection 100 mL  100 mL Intravenous Once in imaging Stephen Jackson MD   100 mL at 07/11/20 1427   • [COMPLETED] levoFLOXacin (LEVAQUIN) 500 mg/100 mL D5W (premix) (LEVAQUIN) 500 mg  500 mg Intravenous Once Stephen Jackson MD   Stopped at 07/11/20 1809   • [COMPLETED] Lidocaine Viscous HCl (XYLOCAINE) 2 % mouth solution 15 mL  15 mL Mouth/Throat Once Stephen Jackson MD   15 mL at 07/11/20 1538   • [COMPLETED] metroNIDAZOLE (FLAGYL) 500 mg/100mL IVPB  500 mg Intravenous Once Stephen Jackson MD   Stopped at 07/11/20 1809   • [COMPLETED] morphine injection 4 mg  4 mg Intravenous Once Stephen Jackson MD   4 mg at 07/11/20 1315   • [COMPLETED] morphine injection 4 mg  4 mg Intravenous Once Stephen Jackson MD   4 mg at 07/11/20 1538   • [COMPLETED] ondansetron (ZOFRAN) injection 4 mg  4 mg Intravenous Once Stephen Jackson MD   4 mg at 07/11/20 1315   • ondansetron (ZOFRAN) injection 4 mg  4 mg Intravenous Q6H PRN Kolby Butler DO   4 mg at 07/13/20 0420   • [COMPLETED] pantoprazole (PROTONIX) injection 40 mg  40 mg Intravenous Once Stephen Jackson MD   40 mg at 07/11/20 1823   • [COMPLETED] promethazine (PHENERGAN) injection 12.5 mg  12.5 mg Intravenous Once Yogesh Mena MD   12.5 mg at 07/12/20 0656   • promethazine (PHENERGAN) injection 12.5 mg  12.5 mg Intravenous Once Yogesh Mena MD       • [COMPLETED]  sodium chloride 0.9 % bolus 1,000 mL  1,000 mL Intravenous Once Stephen Jackson MD   Stopped at 07/11/20 1527   • [COMPLETED] sodium chloride 0.9 % bolus 1,000 mL  1,000 mL Intravenous Once Kolby Butler S DO   Stopped at 07/11/20 2042   • sodium chloride 0.9 % flush 10 mL  10 mL Intravenous PRN Liza Butlerurice S, DO       • sodium chloride 0.9 % flush 10 mL  10 mL Intravenous Q12H Liza Butlerurice S, DO   10 mL at 07/12/20 2204   • sodium chloride 0.9 % flush 10 mL  10 mL Intravenous PRN Liza Butlerurice S, DO       • sodium chloride 0.9 % with KCl 20 mEq/L infusion  100 mL/hr Intravenous Continuous Kolby Butler S,  mL/hr at 07/13/20 0427 100 mL/hr at 07/13/20 0427     Lab Results (last 48 hours)     Procedure Component Value Units Date/Time    Blood Culture - Blood, Arm, Left [[id number]] Collected:  07/11/20 1315    Specimen:  Blood from Arm, Left Updated:  07/12/20 1415     Blood Culture No growth at 24 hours    Blood Culture - Blood, Arm, Right [[id number]] Collected:  07/11/20 1137    Specimen:  Blood from Arm, Right Updated:  07/12/20 1200     Blood Culture No growth at 24 hours    CBC Auto Differential [[id number]]  (Abnormal) Collected:  07/12/20 0609    Specimen:  Blood Updated:  07/12/20 0712     WBC 17.23 10*3/mm3      RBC 4.47 10*6/mm3      Hemoglobin 13.3 g/dL      Hematocrit 39.9 %      MCV 89.3 fL      MCH 29.8 pg      MCHC 33.3 g/dL      RDW 15.2 %      RDW-SD 49.8 fl      MPV 9.3 fL      Platelets 573 10*3/mm3      Neutrophil % 73.0 %      Lymphocyte % 16.3 %      Monocyte % 9.5 %      Eosinophil % 0.2 %      Basophil % 0.5 %      Immature Grans % 0.5 %      Neutrophils, Absolute 12.59 10*3/mm3      Lymphocytes, Absolute 2.80 10*3/mm3      Monocytes, Absolute 1.64 10*3/mm3      Eosinophils, Absolute 0.04 10*3/mm3      Basophils, Absolute 0.08 10*3/mm3      Immature Grans, Absolute 0.08 10*3/mm3      nRBC 0.0 /100 WBC     Comprehensive Metabolic Panel [[id number]]  (Abnormal)  Collected:  07/12/20 0609    Specimen:  Blood Updated:  07/12/20 0711     Glucose 134 mg/dL      BUN 11 mg/dL      Creatinine 0.58 mg/dL      Sodium 134 mmol/L      Potassium 3.7 mmol/L      Chloride 101 mmol/L      CO2 22.0 mmol/L      Calcium 8.4 mg/dL      Total Protein 6.7 g/dL      Albumin 4.00 g/dL      ALT (SGPT) 18 U/L      AST (SGOT) 14 U/L      Alkaline Phosphatase 93 U/L      Total Bilirubin 0.6 mg/dL      eGFR Non African Amer 105 mL/min/1.73      Globulin 2.7 gm/dL      A/G Ratio 1.5 g/dL      BUN/Creatinine Ratio 19.0     Anion Gap 11.0 mmol/L     Narrative:       GFR Normal >60  Chronic Kidney Disease <60  Kidney Failure <15      Magnesium [[id number]]  (Normal) Collected:  07/12/20 0609    Specimen:  Blood Updated:  07/12/20 0711     Magnesium 1.8 mg/dL     Urine Drug Screen - Urine, Clean Catch [[id number]]  (Abnormal) Collected:  07/11/20 1630    Specimen:  Urine, Clean Catch Updated:  07/11/20 1818     THC, Screen, Urine Positive     Phencyclidine (PCP), Urine Negative     Cocaine Screen, Urine Negative     Methamphetamine, Ur Negative     Opiate Screen Positive     Amphetamine Screen, Urine Negative     Benzodiazepine Screen, Urine Negative     Tricyclic Antidepressants Screen Negative     Methadone Screen, Urine Negative     Barbiturates Screen, Urine Negative     Oxycodone Screen, Urine Negative     Propoxyphene Screen Negative     Buprenorphine, Screen, Urine Negative    Narrative:       Cutoff For Drugs Screened:    Amphetamines               500 ng/ml  Barbiturates               200 ng/ml  Benzodiazepines            150 ng/ml  Cocaine                    150 ng/ml  Methadone                  200 ng/ml  Opiates                    100 ng/ml  Phencyclidine               25 ng/ml  THC                            50 ng/ml  Methamphetamine            500 ng/ml  Tricyclic Antidepressants  300 ng/ml  Oxycodone                  100 ng/ml  Propoxyphene               300 ng/ml  Buprenorphine                10 ng/ml    The normal value for all drugs tested is negative. This report includes unconfirmed screening results, with the cutoff values listed, to be used for medical treatment purposes only.  Unconfirmed results must not be used for non-medical purposes such as employment or legal testing.  Clinical consideration should be applied to any drug of abuse test, particularly when unconfirmed results are used.      Urinalysis With Culture If Indicated - Urine, Clean Catch [[id number]]  (Abnormal) Collected:  07/11/20 1630    Specimen:  Urine, Clean Catch Updated:  07/11/20 1652     Color, UA Yellow     Appearance, UA Clear     pH, UA 7.0     Specific Gravity, UA >1.030     Glucose, UA Negative     Ketones, UA Negative     Bilirubin, UA Negative     Blood, UA Moderate (2+)     Protein,  mg/dL (2+)     Leuk Esterase, UA Negative     Nitrite, UA Negative     Urobilinogen, UA 0.2 E.U./dL    Urinalysis, Microscopic Only - Urine, Clean Catch [[id number]]  (Abnormal) Collected:  07/11/20 1630    Specimen:  Urine, Clean Catch Updated:  07/11/20 1652     RBC, UA 31-50 /HPF      WBC, UA 0-2 /HPF      Bacteria, UA None Seen /HPF      Squamous Epithelial Cells, UA None Seen /HPF      Hyaline Casts, UA None Seen /LPF      Methodology Automated Microscopy    COVID PRE-OP / PRE-PROCEDURE SCREENING ORDER (NO ISOLATION) - Swab, Nasopharynx [[id number]] Collected:  07/11/20 1602    Specimen:  Swab from Nasopharynx Updated:  07/11/20 1648    Narrative:       The following orders were created for panel order COVID PRE-OP / PRE-PROCEDURE SCREENING ORDER (NO ISOLATION) - Swab, Nasopharynx.  Procedure                               Abnormality         Status                     ---------                               -----------         ------                     COVID-19, ABBOTT IN-HOUS...[[id number]]  Normal              Final result                 Please view results for these tests on the individual orders.    COVID-19, ABBOTT  IN-HOUSE,NP Swab (NO TRANSPORT MEDIA) 2 HR TAT - Swab, Nasopharynx [[id number]]  (Normal) Collected:  07/11/20 1602    Specimen:  Swab from Nasopharynx Updated:  07/11/20 1648     COVID19 Not Detected    Narrative:       Fact sheet for providers: https://www.fda.gov/media/034041/download     Fact sheet for patients: https://www.fda.gov/media/569729/download    Troponin [[id number]]  (Normal) Collected:  07/11/20 1615    Specimen:  Blood Updated:  07/11/20 1637     Troponin T 0.022 ng/mL     Narrative:       Troponin T Reference Range:  <= 0.03 ng/mL-   Negative for AMI  >0.03 ng/mL-     Abnormal for myocardial necrosis.  Clinicians would have to utilize clinical acumen, EKG, Troponin and serial changes to determine if it is an Acute Myocardial Infarction or myocardial injury due to an underlying chronic condition.       Results may be falsely decreased if patient taking Biotin.      Troponin [[id number]]  (Normal) Collected:  07/11/20 1137    Specimen:  Blood Updated:  07/11/20 1624     Troponin T 0.022 ng/mL     Narrative:       Troponin T Reference Range:  <= 0.03 ng/mL-   Negative for AMI  >0.03 ng/mL-     Abnormal for myocardial necrosis.  Clinicians would have to utilize clinical acumen, EKG, Troponin and serial changes to determine if it is an Acute Myocardial Infarction or myocardial injury due to an underlying chronic condition.       Results may be falsely decreased if patient taking Biotin.      Lactic Acid, Reflex [[id number]]  (Normal) Collected:  07/11/20 1537    Specimen:  Blood Updated:  07/11/20 1608     Lactate 1.6 mmol/L     Lactic Acid, Reflex Timer (This will reflex a repeat order 3-3:15 hours after ordered.) [[id number]] Collected:  07/11/20 1137    Specimen:  Blood Updated:  07/11/20 1515     Hold Tube Hold for add-ons.     Comment: Auto resulted.       Lipase [[id number]]  (Normal) Collected:  07/11/20 1137    Specimen:  Blood Updated:  07/11/20 1313     Lipase 15 U/L     [place] Draw [[id number]]  Collected:  07/11/20 1137    Specimen:  Blood Updated:  07/11/20 1245    Narrative:       The following orders were created for panel order [place] Draw.  Procedure                               Abnormality         Status                     ---------                               -----------         ------                     Light Blue Top[[id number]]                                   Final result               Green Top (Gel)[[id number]]                                  Final result               Lavender Top[[id number]]                                     Final result               Red Top[[id number]]                                          Final result                 Please view results for these tests on the individual orders.    Light Blue Top [[id number]] Collected:  07/11/20 1137    Specimen:  Blood Updated:  07/11/20 1245     Extra Tube hold for add-on     Comment: Auto resulted       Green Top (Gel) [[id number]] Collected:  07/11/20 1137    Specimen:  Blood Updated:  07/11/20 1245     Extra Tube Hold for add-ons.     Comment: Auto resulted.       Lavender Top [[id number]] Collected:  07/11/20 1137    Specimen:  Blood Updated:  07/11/20 1245     Extra Tube hold for add-on     Comment: Auto resulted       Red Top [[id number]] Collected:  07/11/20 1137    Specimen:  Blood Updated:  07/11/20 1245     Extra Tube Hold for add-ons.     Comment: Auto resulted.       Lactic Acid, Plasma [[id number]]  (Abnormal) Collected:  07/11/20 1137    Specimen:  Blood Updated:  07/11/20 1211     Lactate 3.0 mmol/L     Comprehensive Metabolic Panel [[id number]]  (Abnormal) Collected:  07/11/20 1137    Specimen:  Blood Updated:  07/11/20 1209     Glucose 178 mg/dL      BUN 21 mg/dL      Creatinine 0.65 mg/dL      Sodium 134 mmol/L      Potassium 3.6 mmol/L      Chloride 94 mmol/L      CO2 23.0 mmol/L      Calcium 10.3 mg/dL      Total Protein 8.4 g/dL      Albumin 4.90 g/dL      ALT (SGPT) 30 U/L      AST (SGOT) 22 U/L      Alkaline  Phosphatase 132 U/L      Total Bilirubin 0.6 mg/dL      eGFR Non African Amer 92 mL/min/1.73      Globulin 3.5 gm/dL      A/G Ratio 1.4 g/dL      BUN/Creatinine Ratio 32.3     Anion Gap 17.0 mmol/L     Narrative:       GFR Normal >60  Chronic Kidney Disease <60  Kidney Failure <15      CBC & Differential [[id number]] Collected:  07/11/20 1137    Specimen:  Blood Updated:  07/11/20 1154    Narrative:       The following orders were created for panel order CBC & Differential.  Procedure                               Abnormality         Status                     ---------                               -----------         ------                     CBC Auto Differential[[id number]]        Abnormal            Final result                 Please view results for these tests on the individual orders.    CBC Auto Differential [[id number]]  (Abnormal) Collected:  07/11/20 1137    Specimen:  Blood Updated:  07/11/20 1154     WBC 22.10 10*3/mm3      RBC 5.19 10*6/mm3      Hemoglobin 15.9 g/dL      Hematocrit 45.9 %      MCV 88.4 fL      MCH 30.6 pg      MCHC 34.6 g/dL      RDW 15.6 %      RDW-SD 50.2 fl      MPV 8.9 fL      Platelets 722 10*3/mm3      Neutrophil % 82.4 %      Lymphocyte % 10.3 %      Monocyte % 6.1 %      Eosinophil % 0.1 %      Basophil % 0.5 %      Immature Grans % 0.6 %      Neutrophils, Absolute 18.23 10*3/mm3      Lymphocytes, Absolute 2.27 10*3/mm3      Monocytes, Absolute 1.34 10*3/mm3      Eosinophils, Absolute 0.02 10*3/mm3      Basophils, Absolute 0.10 10*3/mm3      Immature Grans, Absolute 0.14 10*3/mm3      nRBC 0.0 /100 WBC           Imaging Results (Last 48 Hours)     Procedure Component Value Units Date/Time    CT Abdomen Pelvis With Contrast [[id number]] Collected:  07/11/20 1451     Updated:  07/11/20 1502    Narrative:       EXAMINATION:   CT ABDOMEN PELVIS W CONTRAST-  7/11/2020 2:51 PM CDT     HISTORY: CT ABDOMEN AND PELVIS WITH CONTRAST 7/11/2020 2:19 PM CDT     HISTORY: Severe epigastric  pain1     COMPARISON: None.      DLP: 105 mGy cm     TECHNIQUE: Following the intravenous administration of contrast, helical  CT tomographic images of the abdomen and pelvis were acquired. Coronal  reformatted images were also provided for review.      FINDINGS:   Motion is noted limiting evaluation of the lung bases there is  suggestion of a 6 mm nodule in the right lung base..      LIVER: No focal liver lesion. The hepatic vasculature is patent.      BILIARY SYSTEM: The gallbladder is unremarkable. No intrahepatic or  extrahepatic ductal dilatation.      PANCREAS: No focal pancreatic lesion.      SPLEEN: Unremarkable.      KIDNEYS AND ADRENALS: Bilateral kidneys and adrenal glands are  unremarkable. The ureters are decompressed and normal in appearance.     RETROPERITONEUM: No mass, lymphadenopathy or hemorrhage.      GI TRACT: No evidence of obstruction or bowel wall thickening. The  appendix is visualized and unremarkable.     OTHER: There is no mesenteric mass, lymphadenopathy or fluid collection.  The abdominopelvic vasculature is patent. The osseous structures and  soft tissues demonstrate no worrisome lesions. Old fracture of the right  greater trochanter is noted.      PELVIS: The uterus is visualized.. The urinary bladder is normal in  appearance.       Impression:       1. No acute intra-abdominal or pelvic abnormality.  2. Old fracture right greater trochanter.  3. 6 mm nodule right lower lobe follow-up with CT in 3-6 months to  confirm persistence is suggested according to Fleischner Society  recommendations     Fleischner Society Recommendations for Management of PARTLY SOLID  Pulmonary nodules:     SOLITARY NODULES:  1. For partly solid nodules measuring less than 6 mm in diameter, no  follow-up is needed.  2. For partly solid nodules measuring greater than 6 mm, CT in 3 to 6  months to confirm persistence. If unchanged and a solid component is  below 6 mm, CT annually for 5 years. (Persistent,  partly solid nodules  containing a solid component greater than 6 mm are highly suspicious).     MULTIPLE NODULES:  1. For multiple, partly solid nodules measuring less than 6 mm in  diameter, CT in 3 to 6 months. If unchanged, consider CT at 2 and 4  years.  2. For multiple, partly solid nodules measuring greater than 6 mm in  diameter, CT in 3 to 6 months. Then management is based on the most  suspicious nodule..         This report was finalized on 07/11/2020 14:59 by Dr. Obed Villavicencio MD.          Orders (last 48 hrs)      Start     Ordered    07/13/20 1306  Auto Discontinue GI Panel in 48 Hours if not Collected  ONCE GI PANEL      07/11/20 1305    07/13/20 0621  promethazine (PHENERGAN) injection 12.5 mg  Once      07/13/20 0621    07/13/20 0600  CBC & Differential  Daily      07/12/20 1329    07/13/20 0600  Basic Metabolic Panel  Daily      07/12/20 1329    07/13/20 0600  CBC Auto Differential  PROCEDURE ONCE      07/13/20 0001    07/12/20 0715  promethazine (PHENERGAN) injection 12.5 mg  Once      07/12/20 0619    07/12/20 0600  Comprehensive Metabolic Panel  Morning Draw      07/11/20 2040    07/12/20 0600  CBC Auto Differential  Morning Draw      07/11/20 2040 07/12/20 0600  Magnesium  Morning Draw      07/11/20 2040 07/12/20 0000  Vital Signs  Every 4 Hours      07/11/20 2040 07/11/20 2130  budesonide-formoterol (SYMBICORT) 160-4.5 MCG/ACT inhaler 2 puff  2 Times Daily - RT      07/11/20 2040 07/11/20 2130  sodium chloride 0.9 % flush 10 mL  Every 12 Hours Scheduled      07/11/20 2040 07/11/20 2130  sodium chloride 0.9 % with KCl 20 mEq/L infusion  Continuous      07/11/20 2040 07/11/20 2046  albuterol (PROVENTIL) nebulizer solution 0.083% 2.5 mg/3mL  Every 4 Hours PRN      07/11/20 2048 07/11/20 2041  Intake & Output  Every Shift      07/11/20 2040 07/11/20 2041  Weigh Patient  Once      07/11/20 2040 07/11/20 2041  Oxygen Therapy- Nasal Cannula; Titrate for SPO2: 90% - 95%   Continuous      07/11/20 2040 07/11/20 2041  Insert Peripheral IV  Once      07/11/20 2040 07/11/20 2041  Saline Lock & Maintain IV Access  Continuous      07/11/20 2040 07/11/20 2041  Place Sequential Compression Device  Once      07/11/20 2040 07/11/20 2041  Maintain Sequential Compression Device  Continuous      07/11/20 2040 07/11/20 2041  Activity - Ad Leah  Until Discontinued      07/11/20 2040 07/11/20 2041  Diet Clear Liquid  Diet Effective Now      07/11/20 2040 07/11/20 2040  sodium chloride 0.9 % flush 10 mL  As Needed      07/11/20 2040 07/11/20 2040  acetaminophen (TYLENOL) tablet 650 mg  Every 4 Hours PRN      07/11/20 2040 07/11/20 2040  ondansetron (ZOFRAN) injection 4 mg  Every 6 Hours PRN      07/11/20 2040 07/11/20 2040  aluminum-magnesium hydroxide-simethicone (MAALOX MAX) 400-400-40 MG/5ML suspension 15 mL  Every 6 Hours PRN      07/11/20 2040 07/11/20 2040  albuterol sulfate HFA (PROVENTIL HFA;VENTOLIN HFA;PROAIR HFA) inhaler 2 puff  Every 4 Hours PRN,   Status:  Discontinued      07/11/20 2040 07/11/20 1807  Urine Drug Screen - Urine, Clean Catch  STAT      07/11/20 1559    07/11/20 1801  HYDROcodone-acetaminophen (NORCO)  MG per tablet 1 tablet  Every 4 Hours PRN      07/11/20 1801    07/11/20 1800  HYDROcodone-acetaminophen (NORCO) 5-325 MG per tablet 1 tablet  Every 4 Hours PRN      07/11/20 1801    07/11/20 1739  Initiate Observation Status  Once      07/11/20 1738    07/11/20 1732  Initiate Observation Status  Once      07/11/20 1733    07/11/20 1731  sodium chloride 0.9 % bolus 1,000 mL  Once      07/11/20 1729    07/11/20 1731  Code Status and Medical Interventions:  Continuous      07/11/20 1733    07/11/20 1731  Inpatient Admission  Once      07/11/20 1733    07/11/20 1719  pantoprazole (PROTONIX) injection 40 mg  Once      07/11/20 1717 07/11/20 1649  Urinalysis, Microscopic Only - Urine, Clean Catch  Once      07/11/20 1648 07/11/20  1615  Urinalysis With Culture If Indicated - Urine, Clean Catch  STAT      07/11/20 1614    07/11/20 1600  Troponin  STAT      07/11/20 1559    07/11/20 1559  Troponin  Once      07/11/20 1559    07/11/20 1558  COVID PRE-OP / PRE-PROCEDURE SCREENING ORDER (NO ISOLATION) - Swab, Nasopharynx  Once      07/11/20 1557    07/11/20 1558  COVID-Moshe ABBOTT IN-HOUSE,NP Swab (NO TRANSPORT MEDIA) 2 HR TAT - Swab, Nasopharynx  PROCEDURE ONCE      07/11/20 1557    07/11/20 1534  morphine injection 4 mg  Once      07/11/20 1532    07/11/20 1534  aluminum-magnesium hydroxide-simethicone (MAALOX MAX) 400-400-40 MG/5ML suspension 15 mL  Once      07/11/20 1532    07/11/20 1534  Lidocaine Viscous HCl (XYLOCAINE) 2 % mouth solution 15 mL  Once      07/11/20 1532    07/11/20 1534  famotidine (PEPCID) injection 20 mg  Once      07/11/20 1532    07/11/20 1516  Lactic Acid, Reflex  STAT      07/11/20 1515    07/11/20 1515  levoFLOXacin (LEVAQUIN) 500 mg/100 mL D5W (premix) (LEVAQUIN) 500 mg  Once      07/11/20 1513    07/11/20 1515  metroNIDAZOLE (FLAGYL) 500 mg/100mL IVPB  Once      07/11/20 1513    07/11/20 1427  iopamidol (ISOVUE-300) 61 % injection 100 mL  Once in Imaging      07/11/20 1425    07/11/20 1307  ondansetron (ZOFRAN) injection 4 mg  Once      07/11/20 1305    07/11/20 1307  morphine injection 4 mg  Once      07/11/20 1305    07/11/20 1307  sodium chloride 0.9 % bolus 1,000 mL  Once      07/11/20 1305    07/11/20 1306  CT Abdomen Pelvis With Contrast  1 Time Imaging     Comments:  IV CONTRAST ONLY      07/11/20 1305    07/11/20 1306  Gastrointestinal Panel, PCR - Stool, Per Rectum  Once      07/11/20 1305    07/11/20 1305  Lipase  STAT      07/11/20 1305    07/11/20 1212  Lactic Acid, Reflex Timer (This will reflex a repeat order 3-3:15 hours after ordered.)  Once      07/11/20 1211    07/11/20 1131  [place] Draw  STAT      07/11/20 1130    07/11/20 1131  Light Blue Top  PROCEDURE ONCE      07/11/20 1130    07/11/20 1131   Green Top (Gel)  PROCEDURE ONCE      07/11/20 1130    07/11/20 1131  Lavender Top  PROCEDURE ONCE      07/11/20 1130    07/11/20 1131  Red Top  PROCEDURE ONCE      07/11/20 1130    07/11/20 1129  Insert Peripheral IV  Once      07/11/20 1129    07/11/20 1129  CBC & Differential  Once      07/11/20 1129    07/11/20 1129  Comprehensive Metabolic Panel  Once      07/11/20 1129    07/11/20 1129  Lactic Acid, Plasma  Once      07/11/20 1129    07/11/20 1129  [place] Draw  Once,   Status:  Canceled      07/11/20 1129    07/11/20 1129  Blood Culture - Blood,  Once      07/11/20 1129    07/11/20 1129  Blood Culture - Blood,  Once      07/11/20 1129    07/11/20 1129  CBC Auto Differential  PROCEDURE ONCE      07/11/20 1129    07/11/20 1129  Light Blue Top  PROCEDURE ONCE,   Status:  Canceled      07/11/20 1129    07/11/20 1129  Green Top (Gel)  PROCEDURE ONCE,   Status:  Canceled      07/11/20 1129    07/11/20 1129  Lavender Top  PROCEDURE ONCE,   Status:  Canceled      07/11/20 1129    07/11/20 1129  Red Top  PROCEDURE ONCE,   Status:  Canceled      07/11/20 1129    07/11/20 1128  sodium chloride 0.9 % flush 10 mL  As Needed      07/11/20 1129    --  buprenorphine-naloxone (SUBOXONE) 8-2 MG per SL tablet  Daily      07/12/20 1718                 Physician Progress Notes (last 48 hours) (Notes from 07/11/20 0634 through 07/13/20 0634)      Kolby Butler DO at 07/12/20 1332              [facility] Medicine Services  INPATIENT PROGRESS NOTE    Length of Stay: 0  Date of Admission: 7/11/2020  Primary Care Physician: Jovi Arita Jr., MD    Subjective     Chief Complaint:     Nausea, vomiting, diarrhea    HPI     The patient is feeling somewhat better today.  Diarrhea has stopped.  We have not been able to get a GI panel so far as the patient has had no further stooling.  She is able to tolerate clear fluids but is anorexic.  White blood cell count has improved to 17,200.  Maximum  temperature of 99.3 since admission.  Vital signs are stable.  Oxygen saturations are 99% on room air.  She continues on normal saline with 20 mEq KCl at 100 cc/h.  Hopefully she will recover enough by tomorrow to be capable of discharging home.    Review of Systems     All pertinent negatives and positives are as above. All other systems have been reviewed and are negative unless otherwise stated.     Objective    Temp:  [97.4 °F (36.3 °C)-99.3 °F (37.4 °C)] 97.4 °F (36.3 °C)  Heart Rate:  [] 75  Resp:  [12-18] 16  BP: (115-153)/(80-99) 147/84    Lab Results (last 24 hours)     Procedure Component Value Units Date/Time    Blood Culture - Blood, Arm, Right [[id number]] Collected:  07/11/20 1137    Specimen:  Blood from Arm, Right Updated:  07/12/20 1200     Blood Culture No growth at 24 hours    CBC Auto Differential [[id number]]  (Abnormal) Collected:  07/12/20 0609    Specimen:  Blood Updated:  07/12/20 0712     WBC 17.23 10*3/mm3      RBC 4.47 10*6/mm3      Hemoglobin 13.3 g/dL      Hematocrit 39.9 %      MCV 89.3 fL      MCH 29.8 pg      MCHC 33.3 g/dL      RDW 15.2 %      RDW-SD 49.8 fl      MPV 9.3 fL      Platelets 573 10*3/mm3      Neutrophil % 73.0 %      Lymphocyte % 16.3 %      Monocyte % 9.5 %      Eosinophil % 0.2 %      Basophil % 0.5 %      Immature Grans % 0.5 %      Neutrophils, Absolute 12.59 10*3/mm3      Lymphocytes, Absolute 2.80 10*3/mm3      Monocytes, Absolute 1.64 10*3/mm3      Eosinophils, Absolute 0.04 10*3/mm3      Basophils, Absolute 0.08 10*3/mm3      Immature Grans, Absolute 0.08 10*3/mm3      nRBC 0.0 /100 WBC     Comprehensive Metabolic Panel [[id number]]  (Abnormal) Collected:  07/12/20 0609    Specimen:  Blood Updated:  07/12/20 0711     Glucose 134 mg/dL      BUN 11 mg/dL      Creatinine 0.58 mg/dL      Sodium 134 mmol/L      Potassium 3.7 mmol/L      Chloride 101 mmol/L      CO2 22.0 mmol/L      Calcium 8.4 mg/dL      Total Protein 6.7 g/dL      Albumin 4.00 g/dL      ALT  (SGPT) 18 U/L      AST (SGOT) 14 U/L      Alkaline Phosphatase 93 U/L      Total Bilirubin 0.6 mg/dL      eGFR Non African Amer 105 mL/min/1.73      Globulin 2.7 gm/dL      A/G Ratio 1.5 g/dL      BUN/Creatinine Ratio 19.0     Anion Gap 11.0 mmol/L     Narrative:       GFR Normal >60  Chronic Kidney Disease <60  Kidney Failure <15      Magnesium [[id number]]  (Normal) Collected:  07/12/20 0609    Specimen:  Blood Updated:  07/12/20 0711     Magnesium 1.8 mg/dL     Urine Drug Screen - Urine, Clean Catch [[id number]]  (Abnormal) Collected:  07/11/20 1630    Specimen:  Urine, Clean Catch Updated:  07/11/20 1818     THC, Screen, Urine Positive     Phencyclidine (PCP), Urine Negative     Cocaine Screen, Urine Negative     Methamphetamine, Ur Negative     Opiate Screen Positive     Amphetamine Screen, Urine Negative     Benzodiazepine Screen, Urine Negative     Tricyclic Antidepressants Screen Negative     Methadone Screen, Urine Negative     Barbiturates Screen, Urine Negative     Oxycodone Screen, Urine Negative     Propoxyphene Screen Negative     Buprenorphine, Screen, Urine Negative    Narrative:       Cutoff For Drugs Screened:    Amphetamines               500 ng/ml  Barbiturates               200 ng/ml  Benzodiazepines            150 ng/ml  Cocaine                    150 ng/ml  Methadone                  200 ng/ml  Opiates                    100 ng/ml  Phencyclidine               25 ng/ml  THC                            50 ng/ml  Methamphetamine            500 ng/ml  Tricyclic Antidepressants  300 ng/ml  Oxycodone                  100 ng/ml  Propoxyphene               300 ng/ml  Buprenorphine               10 ng/ml    The normal value for all drugs tested is negative. This report includes unconfirmed screening results, with the cutoff values listed, to be used for medical treatment purposes only.  Unconfirmed results must not be used for non-medical purposes such as employment or legal testing.  Clinical  consideration should be applied to any drug of abuse test, particularly when unconfirmed results are used.      Urinalysis With Culture If Indicated - Urine, Clean Catch [[id number]]  (Abnormal) Collected:  07/11/20 1630    Specimen:  Urine, Clean Catch Updated:  07/11/20 1652     Color, UA Yellow     Appearance, UA Clear     pH, UA 7.0     Specific Gravity, UA >1.030     Glucose, UA Negative     Ketones, UA Negative     Bilirubin, UA Negative     Blood, UA Moderate (2+)     Protein,  mg/dL (2+)     Leuk Esterase, UA Negative     Nitrite, UA Negative     Urobilinogen, UA 0.2 E.U./dL    Urinalysis, Microscopic Only - Urine, Clean Catch [[id number]]  (Abnormal) Collected:  07/11/20 1630    Specimen:  Urine, Clean Catch Updated:  07/11/20 1652     RBC, UA 31-50 /HPF      WBC, UA 0-2 /HPF      Bacteria, UA None Seen /HPF      Squamous Epithelial Cells, UA None Seen /HPF      Hyaline Casts, UA None Seen /LPF      Methodology Automated Microscopy    COVID PRE-OP / PRE-PROCEDURE SCREENING ORDER (NO ISOLATION) - Swab, Nasopharynx [[id number]] Collected:  07/11/20 1602    Specimen:  Swab from Nasopharynx Updated:  07/11/20 1648    Narrative:       The following orders were created for panel order COVID PRE-OP / PRE-PROCEDURE SCREENING ORDER (NO ISOLATION) - Swab, Nasopharynx.  Procedure                               Abnormality         Status                     ---------                               -----------         ------                     COVID-19, ABBOTT IN-HOUS...[[id number]]  Normal              Final result                 Please view results for these tests on the individual orders.    COVID-19, ABBOTT IN-HOUSE,NP Swab (NO TRANSPORT MEDIA) 2 HR TAT - Swab, Nasopharynx [[id number]]  (Normal) Collected:  07/11/20 1602    Specimen:  Swab from Nasopharynx Updated:  07/11/20 1648     COVID19 Not Detected    Narrative:       Fact sheet for providers: https://www.fda.gov/media/387069/download     Fact sheet for  patients: https://www.fda.gov/media/669960/download    Troponin [[id number]]  (Normal) Collected:  07/11/20 1615    Specimen:  Blood Updated:  07/11/20 1637     Troponin T 0.022 ng/mL     Narrative:       Troponin T Reference Range:  <= 0.03 ng/mL-   Negative for AMI  >0.03 ng/mL-     Abnormal for myocardial necrosis.  Clinicians would have to utilize clinical acumen, EKG, Troponin and serial changes to determine if it is an Acute Myocardial Infarction or myocardial injury due to an underlying chronic condition.       Results may be falsely decreased if patient taking Biotin.      Troponin [[id number]]  (Normal) Collected:  07/11/20 1137    Specimen:  Blood Updated:  07/11/20 1624     Troponin T 0.022 ng/mL     Narrative:       Troponin T Reference Range:  <= 0.03 ng/mL-   Negative for AMI  >0.03 ng/mL-     Abnormal for myocardial necrosis.  Clinicians would have to utilize clinical acumen, EKG, Troponin and serial changes to determine if it is an Acute Myocardial Infarction or myocardial injury due to an underlying chronic condition.       Results may be falsely decreased if patient taking Biotin.      Lactic Acid, Reflex [[id number]]  (Normal) Collected:  07/11/20 1537    Specimen:  Blood Updated:  07/11/20 1608     Lactate 1.6 mmol/L     Lactic Acid, Reflex Timer (This will reflex a repeat order 3-3:15 hours after ordered.) [[id number]] Collected:  07/11/20 1137    Specimen:  Blood Updated:  07/11/20 1515     Hold Tube Hold for add-ons.     Comment: Auto resulted.       Blood Culture - Blood, Arm, Left [[id number]] Collected:  07/11/20 1315    Specimen:  Blood from Arm, Left Updated:  07/11/20 1405          Imaging Results (Last 24 Hours)     Procedure Component Value Units Date/Time    CT Abdomen Pelvis With Contrast [[id number]] Collected:  07/11/20 1451     Updated:  07/11/20 1502    Narrative:       EXAMINATION:   CT ABDOMEN PELVIS W CONTRAST-  7/11/2020 2:51 PM CDT     HISTORY: CT ABDOMEN AND PELVIS WITH CONTRAST  7/11/2020 2:19 PM CDT     HISTORY: Severe epigastric pain1     COMPARISON: None.      DLP: 105 mGy cm     TECHNIQUE: Following the intravenous administration of contrast, helical  CT tomographic images of the abdomen and pelvis were acquired. Coronal  reformatted images were also provided for review.      FINDINGS:   Motion is noted limiting evaluation of the lung bases there is  suggestion of a 6 mm nodule in the right lung base..      LIVER: No focal liver lesion. The hepatic vasculature is patent.      BILIARY SYSTEM: The gallbladder is unremarkable. No intrahepatic or  extrahepatic ductal dilatation.      PANCREAS: No focal pancreatic lesion.      SPLEEN: Unremarkable.      KIDNEYS AND ADRENALS: Bilateral kidneys and adrenal glands are  unremarkable. The ureters are decompressed and normal in appearance.     RETROPERITONEUM: No mass, lymphadenopathy or hemorrhage.      GI TRACT: No evidence of obstruction or bowel wall thickening. The  appendix is visualized and unremarkable.     OTHER: There is no mesenteric mass, lymphadenopathy or fluid collection.  The abdominopelvic vasculature is patent. The osseous structures and  soft tissues demonstrate no worrisome lesions. Old fracture of the right  greater trochanter is noted.      PELVIS: The uterus is visualized.. The urinary bladder is normal in  appearance.       Impression:       1. No acute intra-abdominal or pelvic abnormality.  2. Old fracture right greater trochanter.  3. 6 mm nodule right lower lobe follow-up with CT in 3-6 months to  confirm persistence is suggested according to Fleischner Society  recommendations     Fleischner Society Recommendations for Management of PARTLY SOLID  Pulmonary nodules:     SOLITARY NODULES:  1. For partly solid nodules measuring less than 6 mm in diameter, no  follow-up is needed.  2. For partly solid nodules measuring greater than 6 mm, CT in 3 to 6  months to confirm persistence. If unchanged and a solid component  is  below 6 mm, CT annually for 5 years. (Persistent, partly solid nodules  containing a solid component greater than 6 mm are highly suspicious).     MULTIPLE NODULES:  1. For multiple, partly solid nodules measuring less than 6 mm in  diameter, CT in 3 to 6 months. If unchanged, consider CT at 2 and 4  years.  2. For multiple, partly solid nodules measuring greater than 6 mm in  diameter, CT in 3 to 6 months. Then management is based on the most  suspicious nodule..         This report was finalized on 07/11/2020 14:59 by Dr. Obed Villavicencio MD.             Intake/Output Summary (Last 24 hours) at 7/12/2020 1332  Last data filed at 7/12/2020 0846  Gross per 24 hour   Intake 120 ml   Output --   Net 120 ml       Physical Exam  Constitutional: She is oriented to person, place, and time. She appears well-developed and well-nourished. She appears  ill.   HENT:   Head: Normocephalic and atraumatic.   Nose: Nose normal.   Mouth/Throat: Oropharynx is clear and moist.   Eyes: Pupils are equal, round, and reactive to light. Conjunctivae and EOM are normal. No scleral icterus.   Neck: Normal range of motion. Neck supple. No JVD present. No tracheal deviation present.   Cardiovascular: Normal rate, regular rhythm and normal heart sounds.   No murmur heard.  Pulmonary/Chest: Effort normal and breath sounds normal.   Abdominal: Soft. She exhibits no distension and no mass. Bowel sounds are increased. There is generalized tenderness. There is no guarding.   Musculoskeletal: Normal range of motion. She exhibits no edema.   Neurological: She is alert and oriented to person, place, and time. She displays normal reflexes. No cranial nerve deficit. Coordination normal.   Skin: Skin is warm and dry.  No pallor.   Psychiatric: She has a normal mood and affect.     Results Review:  I have reviewed the labs, radiology results, and diagnostic studies since my last progress note and made treatment changes reflective of the results.   I  have reviewed the current medications.    Assessment/Plan     Active Hospital Problems    Diagnosis   • **Dehydration   • Leukocytosis   • COPD (chronic obstructive pulmonary disease) (CMS/[facility])   • Enterocolitis       PLAN:  Continue IV fluids  Oral fluid intake and ambulation encouraged  GI panel pending  CBC and BMP in a.m.    Kolby Butler DO   07/12/20   13:32      Electronically signed by Kolby Butler DO at 07/12/20 1337       Consult Notes (last 48 hours) (Notes from 07/11/20 0634 through 07/13/20 0634)    No notes of this type exist for this encounter.

## 2020-07-13 NOTE — PROGRESS NOTES
[facility]  INPATIENT PROGRESS NOTE    Patient Name: Tea Sams  Date of Admission: 7/11/2020  Today's Date: 07/13/20  Length of Stay: 0  Primary Care Physician: Jovi Arita Jr., MD    Subjective   Chief Complaint: Persistent nausea and midepigastric pain.  HPI   She has had no vomiting and has had no diarrheal stools since presentation.  However, she states that she remains persistently nauseous with severe midepigastric pain.  She has been unable to tolerate clear liquids.  She has been volume repleted by IV fluids.  Her labs are stable/improving.  She is hemodynamically stable.    She states that she cannot go home today.  Dr. Butler spoke with her yesterday about being discharged this morning.    Plan CT angiogram of the abdomen and pelvis today.    Review of Systems   All pertinent negatives and positives are as above. All other systems have been reviewed and are negative unless otherwise stated.     Objective    Temp:  [97.4 °F (36.3 °C)-99.7 °F (37.6 °C)] 97.9 °F (36.6 °C)  Heart Rate:  [75-94] 94  Resp:  [16] 16  BP: (140-147)/(84-94) 144/90  Physical Exam   Constitutional: She is oriented to person, place, and time.   Up in bed.  Acutely on chronically ill-appearing.  No family present.  Discussed with her nurse, Maranda.   HENT:   Head: Normocephalic and atraumatic.   Eyes: Pupils are equal, round, and reactive to light. Conjunctivae and EOM are normal.   Neck: Neck supple. No JVD present.   Cardiovascular: Normal rate and regular rhythm.   Pulmonary/Chest: Effort normal. No respiratory distress.   Abdominal: Soft. Bowel sounds are normal. She exhibits no distension. There is tenderness (DEB). There is no rebound and no guarding.   Musculoskeletal: Normal range of motion. She exhibits no edema, tenderness or deformity.   Neurological: She is alert and oriented to person, place, and time. She displays normal reflexes. No cranial nerve  deficit. She exhibits normal muscle tone.   Generally weak, but nonfocal.   Skin: Skin is warm and dry. No rash noted.   Psychiatric:   Flat.     Results Review:  I have reviewed the labs, radiology results, and diagnostic studies.    Laboratory Data:   Results from last 7 days   Lab Units 07/13/20  0534 07/12/20  0609 07/11/20  1137   WBC 10*3/mm3 14.03* 17.23* 22.10*   HEMOGLOBIN g/dL 14.1 13.3 15.9   HEMATOCRIT % 42.1 39.9 45.9   PLATELETS 10*3/mm3 564* 573* 722*     Results from last 7 days   Lab Units 07/13/20  0534 07/12/20  0609 07/11/20  1137   SODIUM mmol/L 134* 134* 134*   POTASSIUM mmol/L 3.7 3.7 3.6   CHLORIDE mmol/L 98 101 94*   CO2 mmol/L 24.0 22.0 23.0   BUN mg/dL 8 11 21   CREATININE mg/dL 0.53* 0.58 0.65   CALCIUM mg/dL 8.6 8.4* 10.3   BILIRUBIN mg/dL  --  0.6 0.6   ALK PHOS U/L  --  93 132*   ALT (SGPT) U/L  --  18 30   AST (SGOT) U/L  --  14 22   GLUCOSE mg/dL 143* 134* 178*     Lab Results   Lab Value Date/Time    LIPASE 15 07/11/2020 1137     Culture Data:   Blood Culture   Date Value Ref Range Status   07/11/2020 No growth at 24 hours  Preliminary   07/11/2020 No growth at 24 hours  Preliminary     I have reviewed the patient's current medications.     Assessment/Plan     Active Hospital Problems    Diagnosis   • **Viral gastroenteritis   • Dehydration   • Lactic acidosis   • Leukocytosis   • Chronic pain syndrome   • Tobacco abuse   • Solitary pulmonary nodule   • COPD (chronic obstructive pulmonary disease) (CMS/[facility])     Plan:  The patient was admitted on 7/11 by Dr. Butler.  She presented to the emergency department after having problems with vomiting and abdominal pain followed by a loose stool for several days duration.  She was felt to have viral gastroenteritis.  She was admitted secondary to volume depletion.  She has been unable to produce a stool sample since being admitted.  She remains persistently nauseous with severe midepigastric pain today.    Original CT from 7/11 failed to  show any acute intra-abdominal or pelvic abnormality.  She will have a CT angiogram of the abdomen and pelvis today to further assess.    Continue pain control and anti-emetics.  Continue hydration; decrease.    IV Pepcid.    Offered a nicotine patch and counseled for tobacco cessation.  Continue Symbicort.  She has a solitary pulmonary nodule on CT.  She will need this followed by her primary care physician in 3-6 months.    SCDs for DVT prophylaxis.    Discharge Planning: I expect the patient to be discharged to home tomorrow.    Matias Espino,    07/13/20   09:46

## 2020-07-13 NOTE — PLAN OF CARE
Problem: Patient Care Overview  Goal: Plan of Care Review  Outcome: Ongoing (interventions implemented as appropriate)  Flowsheets  Taken 7/13/2020 1536 by Lizz Meyer  Plan of Care Reviewed With: patient  Taken 7/13/2020 1728 by Maranda Monroy RN  Outcome Summary: pt went for gb us that showed sludge going for HIDA in the am

## 2020-07-13 NOTE — PROGRESS NOTES
Malnutrition Severity Assessment    Patient Name:  Tea Sams  YOB: 1955  MRN: [id number]  Admit Date:  7/11/2020    Patient meets criteria for : Severe Malnutrition    Comments:  Please attest this note and add to dx list if you agree with this assessment. Thanks.    Malnutrition Severity Assessment  Malnutrition Type: Chronic Disease - Related Malnutrition     Malnutrition Type (last 8 hours)      Malnutrition Severity Assessment     Row Name 07/13/20 1530       Malnutrition Severity Assessment    Malnutrition Type  Chronic Disease - Related Malnutrition    Row Name 07/13/20 1530       Insufficient Energy Intake     Insufficient Energy Intake Findings  Moderate    Insufficient Energy Intake   <75% of est. energy requirement for > or equal to 1 month    Row Name 07/13/20 1530       Unintentional Weight Loss     Unintentional Weight Loss Findings  Severe    Unintentional Weight Loss   Weight loss greater than 10% in six months 12% in 7 months    Row Name 07/13/20 1530       Muscle Loss    Loss of Muscle Mass Findings  Severe    Jainism Region  Severe - deep hollowing/scooping, lack of muscle to touch, facial bones well defined    Clavicle Bone Region  Severe - protruding prominent bone    Acromion Bone Region  Severe - squared shoulders, bones, and acromion process protrusion prominent    Row Name 07/13/20 1530       Fat Loss    Subcutaneous Fat Loss Findings  Severe    Orbital Region   Severe - pronounced hollowness/depression, dark circles, loose saggy skin    Row Name 07/13/20 1530       Criteria Met (Must meet criteria for severity in at least 2 of these categories: M Wasting, Fat Loss, Fluid, Secondary Signs, Wt. Status, Intake)    Patient meets criteria for   Severe Malnutrition          Electronically signed by:  Lizz Meyer RDN, LD  07/13/20 15:34

## 2020-07-14 ENCOUNTER — APPOINTMENT (OUTPATIENT)
Dept: NUCLEAR MEDICINE | Facility: HOSPITAL | Age: 65
End: 2020-07-14

## 2020-07-14 PROBLEM — K82.8 BILIARY DYSKINESIA: Status: ACTIVE | Noted: 2020-07-14

## 2020-07-14 PROBLEM — K83.8 BILIARY SLUDGE: Status: ACTIVE | Noted: 2020-07-14

## 2020-07-14 LAB
ALBUMIN SERPL-MCNC: 3.9 G/DL (ref 3.5–5.2)
ALBUMIN/GLOB SERPL: 1.7 G/DL
ALP SERPL-CCNC: 75 U/L (ref 39–117)
ALT SERPL W P-5'-P-CCNC: 18 U/L (ref 1–33)
ANION GAP SERPL CALCULATED.3IONS-SCNC: 9 MMOL/L (ref 5–15)
AST SERPL-CCNC: 17 U/L (ref 1–32)
BASOPHILS # BLD MANUAL: 0.22 10*3/MM3 (ref 0–0.2)
BASOPHILS NFR BLD AUTO: 2 % (ref 0–1.5)
BILIRUB SERPL-MCNC: 0.5 MG/DL (ref 0–1.2)
BUN SERPL-MCNC: 8 MG/DL (ref 8–23)
BUN/CREAT SERPL: 14.3 (ref 7–25)
CALCIUM SPEC-SCNC: 8.8 MG/DL (ref 8.6–10.5)
CHLORIDE SERPL-SCNC: 101 MMOL/L (ref 98–107)
CO2 SERPL-SCNC: 26 MMOL/L (ref 22–29)
CREAT SERPL-MCNC: 0.56 MG/DL (ref 0.57–1)
DEPRECATED RDW RBC AUTO: 47.7 FL (ref 37–54)
EOSINOPHIL # BLD MANUAL: 0.11 10*3/MM3 (ref 0–0.4)
EOSINOPHIL NFR BLD MANUAL: 1 % (ref 0.3–6.2)
ERYTHROCYTE [DISTWIDTH] IN BLOOD BY AUTOMATED COUNT: 14.7 % (ref 12.3–15.4)
GFR SERPL CREATININE-BSD FRML MDRD: 109 ML/MIN/1.73
GLOBULIN UR ELPH-MCNC: 2.3 GM/DL
GLUCOSE SERPL-MCNC: 108 MG/DL (ref 65–99)
HCT VFR BLD AUTO: 38.7 % (ref 34–46.6)
HGB BLD-MCNC: 13.1 G/DL (ref 12–15.9)
LIPASE SERPL-CCNC: 18 U/L (ref 13–60)
LYMPHOCYTES # BLD MANUAL: 4.13 10*3/MM3 (ref 0.7–3.1)
LYMPHOCYTES NFR BLD MANUAL: 37.4 % (ref 19.6–45.3)
LYMPHOCYTES NFR BLD MANUAL: 5.1 % (ref 5–12)
MCH RBC QN AUTO: 30 PG (ref 26.6–33)
MCHC RBC AUTO-ENTMCNC: 33.9 G/DL (ref 31.5–35.7)
MCV RBC AUTO: 88.8 FL (ref 79–97)
MONOCYTES # BLD AUTO: 0.56 10*3/MM3 (ref 0.1–0.9)
NEUTROPHILS # BLD AUTO: 5.46 10*3/MM3 (ref 1.7–7)
NEUTROPHILS NFR BLD MANUAL: 48.5 % (ref 42.7–76)
NEUTS BAND NFR BLD MANUAL: 1 % (ref 0–5)
PLAT MORPH BLD: NORMAL
PLATELET # BLD AUTO: 487 10*3/MM3 (ref 140–450)
PMV BLD AUTO: 8.8 FL (ref 6–12)
POLYCHROMASIA BLD QL SMEAR: ABNORMAL
POTASSIUM SERPL-SCNC: 4 MMOL/L (ref 3.5–5.2)
PROT SERPL-MCNC: 6.2 G/DL (ref 6–8.5)
RBC # BLD AUTO: 4.36 10*6/MM3 (ref 3.77–5.28)
SODIUM SERPL-SCNC: 136 MMOL/L (ref 136–145)
VARIANT LYMPHS NFR BLD MANUAL: 5.1 % (ref 0–5)
WBC # BLD AUTO: 11.03 10*3/MM3 (ref 3.4–10.8)
WBC MORPH BLD: NORMAL

## 2020-07-14 PROCEDURE — 83690 ASSAY OF LIPASE: CPT | Performed by: INTERNAL MEDICINE

## 2020-07-14 PROCEDURE — 80053 COMPREHEN METABOLIC PANEL: CPT | Performed by: INTERNAL MEDICINE

## 2020-07-14 PROCEDURE — 85007 BL SMEAR W/DIFF WBC COUNT: CPT | Performed by: FAMILY MEDICINE

## 2020-07-14 PROCEDURE — 94799 UNLISTED PULMONARY SVC/PX: CPT

## 2020-07-14 PROCEDURE — 0 TECHNETIUM SESTAMIBI: Performed by: INTERNAL MEDICINE

## 2020-07-14 PROCEDURE — 78226 HEPATOBILIARY SYSTEM IMAGING: CPT

## 2020-07-14 PROCEDURE — 25810000003 SODIUM CHLORIDE 0.9 % WITH KCL 20 MEQ 20-0.9 MEQ/L-% SOLUTION: Performed by: INTERNAL MEDICINE

## 2020-07-14 PROCEDURE — 85025 COMPLETE CBC W/AUTO DIFF WBC: CPT | Performed by: FAMILY MEDICINE

## 2020-07-14 PROCEDURE — A9500 TC99M SESTAMIBI: HCPCS | Performed by: INTERNAL MEDICINE

## 2020-07-14 RX ADMIN — POTASSIUM CHLORIDE AND SODIUM CHLORIDE 75 ML/HR: 900; 150 INJECTION, SOLUTION INTRAVENOUS at 04:34

## 2020-07-14 RX ADMIN — HYDROCODONE BITARTRATE AND ACETAMINOPHEN 1 TABLET: 10; 325 TABLET ORAL at 17:29

## 2020-07-14 RX ADMIN — HYDROCODONE BITARTRATE AND ACETAMINOPHEN 1 TABLET: 10; 325 TABLET ORAL at 21:44

## 2020-07-14 RX ADMIN — BUDESONIDE AND FORMOTEROL FUMARATE DIHYDRATE 2 PUFF: 160; 4.5 AEROSOL RESPIRATORY (INHALATION) at 07:08

## 2020-07-14 RX ADMIN — TECHNETIUM TC 99M SESTAMIBI 1 DOSE: 1 INJECTION INTRAVENOUS at 11:00

## 2020-07-14 RX ADMIN — BUDESONIDE AND FORMOTEROL FUMARATE DIHYDRATE 2 PUFF: 160; 4.5 AEROSOL RESPIRATORY (INHALATION) at 20:44

## 2020-07-14 RX ADMIN — NICOTINE 1 PATCH: 14 PATCH, EXTENDED RELEASE TRANSDERMAL at 07:41

## 2020-07-14 RX ADMIN — FAMOTIDINE 20 MG: 10 INJECTION, SOLUTION INTRAVENOUS at 20:31

## 2020-07-14 RX ADMIN — POTASSIUM CHLORIDE AND SODIUM CHLORIDE 75 ML/HR: 900; 150 INJECTION, SOLUTION INTRAVENOUS at 07:39

## 2020-07-14 RX ADMIN — HYDROCODONE BITARTRATE AND ACETAMINOPHEN 1 TABLET: 10; 325 TABLET ORAL at 13:12

## 2020-07-14 RX ADMIN — FAMOTIDINE 20 MG: 10 INJECTION, SOLUTION INTRAVENOUS at 07:40

## 2020-07-14 NOTE — PLAN OF CARE
Problem: Patient Care Overview  Goal: Plan of Care Review  Outcome: Ongoing (interventions implemented as appropriate)  Flowsheets  Taken 7/14/2020 0244 by Trice Mosquera RNA  Plan of Care Reviewed With: patient  Taken 7/14/2020 3049 by Maranda Monroy RN  Outcome Summary: pt went for hida ef was 25% pt going for lap gb in the am npo after midnight

## 2020-07-14 NOTE — PLAN OF CARE
Problem: Patient Care Overview  Goal: Plan of Care Review  Outcome: Ongoing (interventions implemented as appropriate)  Flowsheets (Taken 7/14/2020 0663)  Progress: improving  Plan of Care Reviewed With: patient  Outcome Summary:     Pt medicated for c/o pain z1 this shift. No c/o n/v thus far this shft. IVF infusing per order. Up ad kosta. Voiding per BRP. NPO since midnight for HIDA scan later today. Safety maintained VSS. Will cont to monitor.

## 2020-07-14 NOTE — PROGRESS NOTES
[facility]  INPATIENT PROGRESS NOTE    Patient Name: Tea Sams  Date of Admission: 7/11/2020  Today's Date: 07/14/20  Length of Stay: 3  Primary Care Physician: Jovi Arita Jr., MD    Subjective   Chief Complaint: Midepigastric pain.  HPI   She is no longer nauseous.  She actually wants something to eat and drink very badly.  She still has pain at times.  She has biliary sludge and also likely has some degree of biliary dyskinesia.  I have asked general surgery to evaluate her for a possible laparoscopic cholecystectomy.    Also discussed in detail with her nurse, Maranda.    Review of Systems   All pertinent negatives and positives are as above. All other systems have been reviewed and are negative unless otherwise stated.     Objective    Temp:  [97.9 °F (36.6 °C)-99 °F (37.2 °C)] 97.9 °F (36.6 °C)  Heart Rate:  [] 78  Resp:  [16] 16  BP: ()/(55-96) 109/73  Physical Exam  Constitutional: She is oriented to person, place, and time.   Up in bed.  Acutely on chronically ill-appearing.  No family present. Discussed with her nurse, Maranda.   Head: Normocephalic and atraumatic.   Eyes: Pupils are equal, round, and reactive to light. Conjunctivae and EOM are normal.   Neck: Neck supple. No JVD present.   Cardiovascular: Normal rate and regular rhythm.   Pulmonary/Chest: Effort normal. No respiratory distress.   Abdominal: Soft. Bowel sounds are normal. She exhibits no distension. There is tenderness (DEB). There is no rebound and no guarding.   Musculoskeletal: Normal range of motion. She exhibits no edema, tenderness or deformity.   Neurological: She is alert and oriented to person, place, and time. She displays normal reflexes. No cranial nerve deficit. She exhibits normal muscle tone. Generally weak, but nonfocal.   Skin: Skin is warm and dry. No rash noted.   Psychiatric:  Brighter affect today.     Results Review:  I have reviewed the  labs, radiology results, and diagnostic studies.    Laboratory Data:   Results from last 7 days   Lab Units 07/14/20  0421 07/13/20  0534 07/12/20  0609   WBC 10*3/mm3 11.03* 14.03* 17.23*   HEMOGLOBIN g/dL 13.1 14.1 13.3   HEMATOCRIT % 38.7 42.1 39.9   PLATELETS 10*3/mm3 487* 564* 573*     Results from last 7 days   Lab Units 07/14/20  0421 07/13/20  0534 07/12/20  0609 07/11/20  1137   SODIUM mmol/L 136 134* 134* 134*   POTASSIUM mmol/L 4.0 3.7 3.7 3.6   CHLORIDE mmol/L 101 98 101 94*   CO2 mmol/L 26.0 24.0 22.0 23.0   BUN mg/dL 8 8 11 21   CREATININE mg/dL 0.56* 0.53* 0.58 0.65   CALCIUM mg/dL 8.8 8.6 8.4* 10.3   BILIRUBIN mg/dL 0.5  --  0.6 0.6   ALK PHOS U/L 75  --  93 132*   ALT (SGPT) U/L 18  --  18 30   AST (SGOT) U/L 17  --  14 22   GLUCOSE mg/dL 108* 143* 134* 178*     Culture Data:   Blood Culture   Date Value Ref Range Status   07/11/2020 No growth at 3 days  Preliminary   07/11/2020 No growth at 3 days  Preliminary     Radiology Data:   Imaging Results (Last 24 Hours)     Procedure Component Value Units Date/Time    NM Hepatobiliary Without CCK [[id number]] Collected:  07/14/20 1417     Updated:  07/14/20 1420    Narrative:       EXAMINATION:   NM HEPATOBILIARY WITHOUT CCK-  7/14/2020 2:17 PM CDT     HISTORY: NM HEPATOBILIARY WITHOUT CCK- 7/14/2020 11:00 AM CDT     Clinical History: Nausea, vomiting, diarrhea; E86.0-Dehydration;  K52.9-Noninfective gastroenteritis and colitis, unspecified     Comparison: None.      Radiopharmaceutical: 5.5 mCi technetium 99m Mebrofenin IV.      Technique: Anterior images were acquired over the upper abdomen for a  period of 60 minutes following intravenous administration of the  radiopharmaceutical.      Subsequently, the patient ingested a 1 ounce mixture of corn oil  emulsion, and additional images of the abdomen were obtained for 60  minutes. Gallbladder ejection fraction was calculated.      Findings:    There is prompt progression of the isotope from the liver into  the  intrahepatic biliary ducts, common bile duct and gallbladder.  Radiotracer is noted in the small bowel at the end of 60 minutes.      Following ingestion of the corn oil emulsion, calculated gallbladder  ejection fraction is 25% (normal > 20%). There is further radiotracer  accumulation in the small bowel.        Impression:       Impression:   1. 25% biliary ejection fraction..     This report was finalized on 07/14/2020 14:17 by Dr. Obed Villavicencio MD.        I have reviewed the patient's current medications.     Assessment/Plan     Active Hospital Problems    Diagnosis   • **Viral gastroenteritis   • Biliary dyskinesia   • Biliary sludge   • Dehydration   • Lactic acidosis   • Leukocytosis   • Chronic pain syndrome   • Tobacco abuse   • Solitary pulmonary nodule   • COPD (chronic obstructive pulmonary disease) (CMS/[facility])     Plan:  The patient was admitted on 7/11 by Dr. Butler.  She presented to the emergency department after having problems with vomiting and abdominal pain followed by a loose stool for several days duration.  She was felt to have viral gastroenteritis.  She was admitted secondary to volume depletion.  She has been unable to produce a stool sample since being admitted.  She remains persistently nauseous with severe midepigastric pain today.     Original CT from 7/11 failed to show any acute intra-abdominal or pelvic abnormality.  CT angiogram of the abdomen and pelvis on 7/13 showed a hyperdense gallbladder compatible with sludge or tiny stones.  Normal celiac artery and SMA.  No acute abnormality seen within the abdomen or pelvis.  I followed this with a gallbladder ultrasound that confirmed it to be filled with sludge, but no shadowing stones or bile duct dilation.  A HIDA scan today shows an EF of 25%.  I have consulted general surgery to consider cholecystectomy.     Continue pain control and anti-emetics.  Continue hydration; decrease.     IV Pepcid.     Offered a nicotine patch and  counseled for tobacco cessation.  Continue Symbicort.  She has a solitary pulmonary nodule on CT.  She will need this followed by her primary care physician in 3-6 months.     SCDs for DVT prophylaxis.     Discharge Planning: Depends on general surgery evaluation.    Matias Espino DO   07/14/20   14:46

## 2020-07-15 ENCOUNTER — ANESTHESIA EVENT (OUTPATIENT)
Dept: PERIOP | Facility: HOSPITAL | Age: 65
End: 2020-07-15

## 2020-07-15 ENCOUNTER — ANESTHESIA (OUTPATIENT)
Dept: PERIOP | Facility: HOSPITAL | Age: 65
End: 2020-07-15

## 2020-07-15 LAB
BASOPHILS # BLD AUTO: 0.11 10*3/MM3 (ref 0–0.2)
BASOPHILS NFR BLD AUTO: 1.2 % (ref 0–1.5)
DEPRECATED RDW RBC AUTO: 50 FL (ref 37–54)
EOSINOPHIL # BLD AUTO: 0.26 10*3/MM3 (ref 0–0.4)
EOSINOPHIL NFR BLD AUTO: 2.8 % (ref 0.3–6.2)
ERYTHROCYTE [DISTWIDTH] IN BLOOD BY AUTOMATED COUNT: 15 % (ref 12.3–15.4)
HCT VFR BLD AUTO: 37.6 % (ref 34–46.6)
HGB BLD-MCNC: 12.4 G/DL (ref 12–15.9)
IMM GRANULOCYTES # BLD AUTO: 0.03 10*3/MM3 (ref 0–0.05)
IMM GRANULOCYTES NFR BLD AUTO: 0.3 % (ref 0–0.5)
LYMPHOCYTES # BLD AUTO: 3.62 10*3/MM3 (ref 0.7–3.1)
LYMPHOCYTES NFR BLD AUTO: 38.4 % (ref 19.6–45.3)
MCH RBC QN AUTO: 30.2 PG (ref 26.6–33)
MCHC RBC AUTO-ENTMCNC: 33 G/DL (ref 31.5–35.7)
MCV RBC AUTO: 91.5 FL (ref 79–97)
MONOCYTES # BLD AUTO: 0.88 10*3/MM3 (ref 0.1–0.9)
MONOCYTES NFR BLD AUTO: 9.3 % (ref 5–12)
NEUTROPHILS NFR BLD AUTO: 4.53 10*3/MM3 (ref 1.7–7)
NEUTROPHILS NFR BLD AUTO: 48 % (ref 42.7–76)
NRBC BLD AUTO-RTO: 0 /100 WBC (ref 0–0.2)
PLATELET # BLD AUTO: 477 10*3/MM3 (ref 140–450)
PMV BLD AUTO: 9 FL (ref 6–12)
RBC # BLD AUTO: 4.11 10*6/MM3 (ref 3.77–5.28)
WBC # BLD AUTO: 9.43 10*3/MM3 (ref 3.4–10.8)

## 2020-07-15 PROCEDURE — 0FT44ZZ RESECTION OF GALLBLADDER, PERCUTANEOUS ENDOSCOPIC APPROACH: ICD-10-PCS | Performed by: SPECIALIST

## 2020-07-15 PROCEDURE — 88304 TISSUE EXAM BY PATHOLOGIST: CPT | Performed by: SPECIALIST

## 2020-07-15 PROCEDURE — 25010000002 FENTANYL CITRATE (PF) 100 MCG/2ML SOLUTION: Performed by: ANESTHESIOLOGY

## 2020-07-15 PROCEDURE — 25010000002 PROMETHAZINE PER 50 MG: Performed by: INTERNAL MEDICINE

## 2020-07-15 PROCEDURE — 25010000002 ONDANSETRON PER 1 MG: Performed by: NURSE ANESTHETIST, CERTIFIED REGISTERED

## 2020-07-15 PROCEDURE — 25010000002 HYDROMORPHONE PER 4 MG: Performed by: ANESTHESIOLOGY

## 2020-07-15 PROCEDURE — 25010000002 PHENYLEPHRINE HCL 0.8 MG/10ML SOLUTION PREFILLED SYRINGE: Performed by: NURSE ANESTHETIST, CERTIFIED REGISTERED

## 2020-07-15 PROCEDURE — 25010000002 DEXAMETHASONE PER 1 MG: Performed by: ANESTHESIOLOGY

## 2020-07-15 PROCEDURE — 94799 UNLISTED PULMONARY SVC/PX: CPT

## 2020-07-15 PROCEDURE — 25010000002 PROPOFOL 10 MG/ML EMULSION: Performed by: NURSE ANESTHETIST, CERTIFIED REGISTERED

## 2020-07-15 PROCEDURE — 25010000002 FENTANYL CITRATE (PF) 100 MCG/2ML SOLUTION: Performed by: NURSE ANESTHETIST, CERTIFIED REGISTERED

## 2020-07-15 PROCEDURE — 85025 COMPLETE CBC W/AUTO DIFF WBC: CPT | Performed by: FAMILY MEDICINE

## 2020-07-15 PROCEDURE — 25010000002 ONDANSETRON PER 1 MG: Performed by: INTERNAL MEDICINE

## 2020-07-15 PROCEDURE — 25010000002 DEXAMETHASONE PER 1 MG: Performed by: NURSE ANESTHETIST, CERTIFIED REGISTERED

## 2020-07-15 PROCEDURE — 25010000002 ONDANSETRON PER 1 MG: Performed by: SPECIALIST

## 2020-07-15 RX ORDER — HYDROCODONE BITARTRATE AND ACETAMINOPHEN 7.5; 325 MG/1; MG/1
1 TABLET ORAL EVERY 4 HOURS PRN
Qty: 30 TABLET | Refills: 0 | Status: SHIPPED | OUTPATIENT
Start: 2020-07-15 | End: 2021-03-24

## 2020-07-15 RX ORDER — PHENYLEPHRINE HCL IN 0.9% NACL 0.8MG/10ML
SYRINGE (ML) INTRAVENOUS AS NEEDED
Status: DISCONTINUED | OUTPATIENT
Start: 2020-07-15 | End: 2020-07-15 | Stop reason: SURG

## 2020-07-15 RX ORDER — DEXAMETHASONE SODIUM PHOSPHATE 4 MG/ML
4 INJECTION, SOLUTION INTRA-ARTICULAR; INTRALESIONAL; INTRAMUSCULAR; INTRAVENOUS; SOFT TISSUE ONCE AS NEEDED
Status: COMPLETED | OUTPATIENT
Start: 2020-07-15 | End: 2020-07-15

## 2020-07-15 RX ORDER — SODIUM CHLORIDE 0.9 % (FLUSH) 0.9 %
3 SYRINGE (ML) INJECTION EVERY 12 HOURS SCHEDULED
Status: DISCONTINUED | OUTPATIENT
Start: 2020-07-15 | End: 2020-07-15 | Stop reason: HOSPADM

## 2020-07-15 RX ORDER — HYDROMORPHONE HYDROCHLORIDE 1 MG/ML
0.5 INJECTION, SOLUTION INTRAMUSCULAR; INTRAVENOUS; SUBCUTANEOUS
Status: DISCONTINUED | OUTPATIENT
Start: 2020-07-15 | End: 2020-07-15 | Stop reason: HOSPADM

## 2020-07-15 RX ORDER — ONDANSETRON 2 MG/ML
INJECTION INTRAMUSCULAR; INTRAVENOUS AS NEEDED
Status: DISCONTINUED | OUTPATIENT
Start: 2020-07-15 | End: 2020-07-15 | Stop reason: SURG

## 2020-07-15 RX ORDER — PROMETHAZINE HYDROCHLORIDE 25 MG/ML
12.5 INJECTION, SOLUTION INTRAMUSCULAR; INTRAVENOUS EVERY 6 HOURS PRN
Status: DISCONTINUED | OUTPATIENT
Start: 2020-07-15 | End: 2020-07-16 | Stop reason: HOSPADM

## 2020-07-15 RX ORDER — MORPHINE SULFATE 2 MG/ML
2 INJECTION, SOLUTION INTRAMUSCULAR; INTRAVENOUS
Status: DISCONTINUED | OUTPATIENT
Start: 2020-07-15 | End: 2020-07-15 | Stop reason: HOSPADM

## 2020-07-15 RX ORDER — ONDANSETRON HCL IN 0.9 % NACL 8 MG/50 ML
8 INTRAVENOUS SOLUTION, PIGGYBACK (ML) INTRAVENOUS EVERY 6 HOURS PRN
Status: DISCONTINUED | OUTPATIENT
Start: 2020-07-15 | End: 2020-07-16 | Stop reason: HOSPADM

## 2020-07-15 RX ORDER — FENTANYL CITRATE 50 UG/ML
25 INJECTION, SOLUTION INTRAMUSCULAR; INTRAVENOUS
Status: DISCONTINUED | OUTPATIENT
Start: 2020-07-15 | End: 2020-07-15 | Stop reason: HOSPADM

## 2020-07-15 RX ORDER — MIDAZOLAM HYDROCHLORIDE 1 MG/ML
2 INJECTION INTRAMUSCULAR; INTRAVENOUS
Status: DISCONTINUED | OUTPATIENT
Start: 2020-07-15 | End: 2020-07-15 | Stop reason: HOSPADM

## 2020-07-15 RX ORDER — SODIUM CHLORIDE 9 MG/ML
INJECTION, SOLUTION INTRAVENOUS AS NEEDED
Status: DISCONTINUED | OUTPATIENT
Start: 2020-07-15 | End: 2020-07-15 | Stop reason: HOSPADM

## 2020-07-15 RX ORDER — SODIUM CHLORIDE 0.9 % (FLUSH) 0.9 %
3-10 SYRINGE (ML) INJECTION AS NEEDED
Status: DISCONTINUED | OUTPATIENT
Start: 2020-07-15 | End: 2020-07-15 | Stop reason: HOSPADM

## 2020-07-15 RX ORDER — LIDOCAINE HYDROCHLORIDE 10 MG/ML
0.5 INJECTION, SOLUTION EPIDURAL; INFILTRATION; INTRACAUDAL; PERINEURAL ONCE AS NEEDED
Status: DISCONTINUED | OUTPATIENT
Start: 2020-07-15 | End: 2020-07-15 | Stop reason: HOSPADM

## 2020-07-15 RX ORDER — NALOXONE HCL 0.4 MG/ML
0.04 VIAL (ML) INJECTION AS NEEDED
Status: DISCONTINUED | OUTPATIENT
Start: 2020-07-15 | End: 2020-07-15 | Stop reason: HOSPADM

## 2020-07-15 RX ORDER — SODIUM CHLORIDE, SODIUM LACTATE, POTASSIUM CHLORIDE, CALCIUM CHLORIDE 600; 310; 30; 20 MG/100ML; MG/100ML; MG/100ML; MG/100ML
100 INJECTION, SOLUTION INTRAVENOUS CONTINUOUS
Status: DISCONTINUED | OUTPATIENT
Start: 2020-07-15 | End: 2020-07-15

## 2020-07-15 RX ORDER — LABETALOL HYDROCHLORIDE 5 MG/ML
5 INJECTION, SOLUTION INTRAVENOUS
Status: DISCONTINUED | OUTPATIENT
Start: 2020-07-15 | End: 2020-07-15 | Stop reason: HOSPADM

## 2020-07-15 RX ORDER — HYDROCODONE BITARTRATE AND ACETAMINOPHEN 7.5; 325 MG/1; MG/1
1 TABLET ORAL EVERY 6 HOURS PRN
Status: DISCONTINUED | OUTPATIENT
Start: 2020-07-15 | End: 2020-07-16 | Stop reason: HOSPADM

## 2020-07-15 RX ORDER — LIDOCAINE HYDROCHLORIDE 40 MG/ML
SOLUTION TOPICAL AS NEEDED
Status: DISCONTINUED | OUTPATIENT
Start: 2020-07-15 | End: 2020-07-15 | Stop reason: SURG

## 2020-07-15 RX ORDER — OXYCODONE AND ACETAMINOPHEN 7.5; 325 MG/1; MG/1
2 TABLET ORAL ONCE AS NEEDED
Status: DISCONTINUED | OUTPATIENT
Start: 2020-07-15 | End: 2020-07-15 | Stop reason: HOSPADM

## 2020-07-15 RX ORDER — FENTANYL CITRATE 50 UG/ML
INJECTION, SOLUTION INTRAMUSCULAR; INTRAVENOUS AS NEEDED
Status: DISCONTINUED | OUTPATIENT
Start: 2020-07-15 | End: 2020-07-15 | Stop reason: SURG

## 2020-07-15 RX ORDER — PROPOFOL 10 MG/ML
VIAL (ML) INTRAVENOUS AS NEEDED
Status: DISCONTINUED | OUTPATIENT
Start: 2020-07-15 | End: 2020-07-15 | Stop reason: SURG

## 2020-07-15 RX ORDER — ROCURONIUM BROMIDE 10 MG/ML
INJECTION, SOLUTION INTRAVENOUS AS NEEDED
Status: DISCONTINUED | OUTPATIENT
Start: 2020-07-15 | End: 2020-07-15 | Stop reason: SURG

## 2020-07-15 RX ORDER — MAGNESIUM HYDROXIDE 1200 MG/15ML
LIQUID ORAL AS NEEDED
Status: DISCONTINUED | OUTPATIENT
Start: 2020-07-15 | End: 2020-07-15 | Stop reason: HOSPADM

## 2020-07-15 RX ORDER — ONDANSETRON 2 MG/ML
4 INJECTION INTRAMUSCULAR; INTRAVENOUS AS NEEDED
Status: DISCONTINUED | OUTPATIENT
Start: 2020-07-15 | End: 2020-07-15 | Stop reason: HOSPADM

## 2020-07-15 RX ORDER — NEOSTIGMINE METHYLSULFATE 5 MG/5 ML
SYRINGE (ML) INTRAVENOUS AS NEEDED
Status: DISCONTINUED | OUTPATIENT
Start: 2020-07-15 | End: 2020-07-15 | Stop reason: SURG

## 2020-07-15 RX ORDER — DEXAMETHASONE SODIUM PHOSPHATE 4 MG/ML
INJECTION, SOLUTION INTRA-ARTICULAR; INTRALESIONAL; INTRAMUSCULAR; INTRAVENOUS; SOFT TISSUE AS NEEDED
Status: DISCONTINUED | OUTPATIENT
Start: 2020-07-15 | End: 2020-07-15 | Stop reason: SURG

## 2020-07-15 RX ORDER — OXYCODONE AND ACETAMINOPHEN 10; 325 MG/1; MG/1
1 TABLET ORAL ONCE AS NEEDED
Status: DISCONTINUED | OUTPATIENT
Start: 2020-07-15 | End: 2020-07-15 | Stop reason: HOSPADM

## 2020-07-15 RX ORDER — BUPIVACAINE HYDROCHLORIDE AND EPINEPHRINE 5; 5 MG/ML; UG/ML
INJECTION, SOLUTION PERINEURAL AS NEEDED
Status: DISCONTINUED | OUTPATIENT
Start: 2020-07-15 | End: 2020-07-15 | Stop reason: HOSPADM

## 2020-07-15 RX ORDER — FLUMAZENIL 0.1 MG/ML
0.2 INJECTION INTRAVENOUS AS NEEDED
Status: DISCONTINUED | OUTPATIENT
Start: 2020-07-15 | End: 2020-07-15 | Stop reason: HOSPADM

## 2020-07-15 RX ORDER — ACETAMINOPHEN 500 MG
1000 TABLET ORAL ONCE
Status: COMPLETED | OUTPATIENT
Start: 2020-07-15 | End: 2020-07-15

## 2020-07-15 RX ADMIN — Medication 160 MCG: at 09:12

## 2020-07-15 RX ADMIN — PROPOFOL 150 MG: 10 INJECTION, EMULSION INTRAVENOUS at 09:00

## 2020-07-15 RX ADMIN — SODIUM CHLORIDE, POTASSIUM CHLORIDE, SODIUM LACTATE AND CALCIUM CHLORIDE 100 ML/HR: 600; 310; 30; 20 INJECTION, SOLUTION INTRAVENOUS at 10:55

## 2020-07-15 RX ADMIN — GLYCOPYRROLATE 0.4 MG: 0.2 INJECTION, SOLUTION INTRAMUSCULAR; INTRAVENOUS at 09:38

## 2020-07-15 RX ADMIN — ONDANSETRON 8 MG: 2 INJECTION INTRAMUSCULAR; INTRAVENOUS at 21:48

## 2020-07-15 RX ADMIN — ONDANSETRON HYDROCHLORIDE 4 MG: 2 SOLUTION INTRAMUSCULAR; INTRAVENOUS at 15:19

## 2020-07-15 RX ADMIN — DEXAMETHASONE SODIUM PHOSPHATE 4 MG: 4 INJECTION, SOLUTION INTRAMUSCULAR; INTRAVENOUS at 09:30

## 2020-07-15 RX ADMIN — SODIUM CHLORIDE, PRESERVATIVE FREE 10 ML: 5 INJECTION INTRAVENOUS at 08:03

## 2020-07-15 RX ADMIN — PROMETHAZINE HYDROCHLORIDE 12.5 MG: 25 INJECTION INTRAMUSCULAR; INTRAVENOUS at 18:47

## 2020-07-15 RX ADMIN — FAMOTIDINE 20 MG: 10 INJECTION, SOLUTION INTRAVENOUS at 08:03

## 2020-07-15 RX ADMIN — HYDROMORPHONE HYDROCHLORIDE 0.5 MG: 1 INJECTION, SOLUTION INTRAMUSCULAR; INTRAVENOUS; SUBCUTANEOUS at 10:50

## 2020-07-15 RX ADMIN — LIDOCAINE HYDROCHLORIDE 40 MG: 20 INJECTION, SOLUTION INTRAVENOUS at 09:05

## 2020-07-15 RX ADMIN — BUDESONIDE AND FORMOTEROL FUMARATE DIHYDRATE 2 PUFF: 160; 4.5 AEROSOL RESPIRATORY (INHALATION) at 07:24

## 2020-07-15 RX ADMIN — ACETAMINOPHEN 1000 MG: 500 TABLET, FILM COATED ORAL at 08:30

## 2020-07-15 RX ADMIN — DEXAMETHASONE SODIUM PHOSPHATE 4 MG: 4 INJECTION, SOLUTION INTRAMUSCULAR; INTRAVENOUS at 08:30

## 2020-07-15 RX ADMIN — FENTANYL CITRATE 100 MCG: 50 INJECTION, SOLUTION INTRAMUSCULAR; INTRAVENOUS at 09:00

## 2020-07-15 RX ADMIN — HYDROCODONE BITARTRATE AND ACETAMINOPHEN 1 TABLET: 10; 325 TABLET ORAL at 14:08

## 2020-07-15 RX ADMIN — FAMOTIDINE 20 MG: 10 INJECTION, SOLUTION INTRAVENOUS at 20:50

## 2020-07-15 RX ADMIN — LIDOCAINE HYDROCHLORIDE 1 EACH: 40 SOLUTION TOPICAL at 09:06

## 2020-07-15 RX ADMIN — NICOTINE 1 PATCH: 14 PATCH, EXTENDED RELEASE TRANSDERMAL at 08:02

## 2020-07-15 RX ADMIN — ONDANSETRON HYDROCHLORIDE 4 MG: 2 SOLUTION INTRAMUSCULAR; INTRAVENOUS at 09:30

## 2020-07-15 RX ADMIN — FENTANYL CITRATE 25 MCG: 50 INJECTION, SOLUTION INTRAMUSCULAR; INTRAVENOUS at 10:20

## 2020-07-15 RX ADMIN — FENTANYL CITRATE 100 MCG: 50 INJECTION, SOLUTION INTRAMUSCULAR; INTRAVENOUS at 09:51

## 2020-07-15 RX ADMIN — CEFAZOLIN 1 G: 1 INJECTION, POWDER, FOR SOLUTION INTRAMUSCULAR; INTRAVENOUS; PARENTERAL at 09:14

## 2020-07-15 RX ADMIN — PROPOFOL 100 MG: 10 INJECTION, EMULSION INTRAVENOUS at 09:05

## 2020-07-15 RX ADMIN — ROCURONIUM BROMIDE 20 MG: 10 INJECTION INTRAVENOUS at 09:06

## 2020-07-15 RX ADMIN — VASOPRESSIN 0.5 ML: 20 INJECTION INTRAVENOUS at 09:17

## 2020-07-15 RX ADMIN — Medication 3 MG: at 09:38

## 2020-07-15 RX ADMIN — ROCURONIUM BROMIDE 5 MG: 10 INJECTION INTRAVENOUS at 09:05

## 2020-07-15 RX ADMIN — Medication 80 MCG: at 09:15

## 2020-07-15 RX ADMIN — SODIUM CHLORIDE, POTASSIUM CHLORIDE, SODIUM LACTATE AND CALCIUM CHLORIDE 100 ML/HR: 600; 310; 30; 20 INJECTION, SOLUTION INTRAVENOUS at 08:30

## 2020-07-15 RX ADMIN — FENTANYL CITRATE 25 MCG: 50 INJECTION, SOLUTION INTRAMUSCULAR; INTRAVENOUS at 10:15

## 2020-07-15 RX ADMIN — HYDROMORPHONE HYDROCHLORIDE 0.5 MG: 1 INJECTION, SOLUTION INTRAMUSCULAR; INTRAVENOUS; SUBCUTANEOUS at 10:35

## 2020-07-15 RX ADMIN — FENTANYL CITRATE 100 MCG: 50 INJECTION, SOLUTION INTRAMUSCULAR; INTRAVENOUS at 09:26

## 2020-07-15 NOTE — CONSULTS
Tanna Mclain MD Consult Note      Patient Care Team:  Jovi Arita Jr., MD as PCP - General (Family Medicine)    Chief complaint nausea vomiting abdominal pain for approximately 1 week    Subjective .     History of present illness: Last week she began developing significant diarrhea followed by nausea vomiting and abdominal pain in her upper abdomen.  She denied fevers.  No blood in her emesis or her stool.  She has not really been able to keep anything down.  Ultimately came to the ER and was admitted for these above symptoms.  She states she has had on and off symptoms like this for years but this was the most severe attack    Review of Systems  Pertinent items are noted in HPI, all other systems reviewed and negative    History  Past Medical History:   Diagnosis Date   • Arthritis    • COPD (chronic obstructive pulmonary disease) (CMS/HCC)    • GERD (gastroesophageal reflux disease)    , Past Surgical History:   Procedure Laterality Date   • ABDOMINAL SURGERY     •  SECTION     • ENDOSCOPY N/A 10/20/2017    Procedure: ESOPHAGOGASTRODUODENOSCOPY WITH ANESTHESIA;  Surgeon: Femi Goddard MD;  Location: [facility] OR;  Service:    • HERNIA REPAIR     • TONSILLECTOMY     , Family History   Problem Relation Age of Onset   • Cancer Mother    • Cancer Father    , Social History     Tobacco Use   • Smoking status: Current Every Day Smoker     Packs/day: 1.00     Years: 40.00     Pack years: 40.00     Types: Cigarettes   • Smokeless tobacco: Never Used   Substance Use Topics   • Alcohol use: No   • Drug use: No   , Medications Prior to Admission   Medication Sig Dispense Refill Last Dose   • albuterol sulfate  (90 Base) MCG/ACT inhaler Inhale 2 puffs by mouth Every 4 (Four) Hours As Needed for Wheezing. 8.5 g 0 Past Week at Unknown time   • budesonide-formoterol (SYMBICORT) 160-4.5 MCG/ACT inhaler Inhale 2 puffs 2 (Two) Times a Day. 1 inhaler 0 7/10/2020 at Unknown time   • buprenorphine-naloxone  (SUBOXONE) 8-2 MG per SL tablet Place 1.5 tablets under the tongue Daily.   7/10/2020 at Unknown time   , Scheduled Meds:    acetaminophen 1,000 mg Oral Once   [MAR Hold] budesonide-formoterol 2 puff Inhalation BID - RT   ceFAZolin 1 g Intravenous Once   famotidine 20 mg Intravenous Q12H   [MAR Hold] nicotine 1 patch Transdermal Q24H   [MAR Hold] sodium chloride 10 mL Intravenous Q12H   sodium chloride 3 mL Intravenous Q12H   , Continuous Infusions:    lactated ringers 100 mL/hr    sodium chloride 0.9 % with KCl 20 mEq 75 mL/hr Last Rate: 75 mL/hr (07/14/20 0739)   , PRN Meds:  [MAR Hold] acetaminophen  •  [MAR Hold] albuterol  •  [MAR Hold] aluminum-magnesium hydroxide-simethicone  •  dexamethasone  •  [MAR Hold] HYDROcodone-acetaminophen  •  [MAR Hold] HYDROcodone-acetaminophen  •  lidocaine PF 1%  •  midazolam  •  [MAR Hold] ondansetron  •  [MAR Hold] sodium chloride  •  [MAR Hold] sodium chloride  •  sodium chloride and Allergies:  Patient has no known allergies.    Objective     Vital Signs   Temp:  [97.7 °F (36.5 °C)-98.5 °F (36.9 °C)] 98.3 °F (36.8 °C)  Heart Rate:  [65-89] 78  Resp:  [16-18] 18  BP: ()/(48-94) 105/55    Intake & Output (last 3 days)       07/12 0701 - 07/13 0700 07/13 0701 - 07/14 0700 07/14 0701 - 07/15 0700 07/15 0701 - 07/16 0700    P.O. 360 240      I.V. (mL/kg)  4000 (92)      Total Intake(mL/kg) 360 (8.3) 4240 (97.5)      Net +360 +4240              Urine Unmeasured Occurrence 5 x 6 x 7 x            Physical Exam:     General Appearance:    Alert, cooperative, in no acute distress   Head:    Normocephalic, without obvious abnormality, atraumatic   Eyes:            Lids and lashes normal, conjunctivae and sclerae normal, no   icterus, no pallor, corneas clear, PERRLA   Ears:    Ears appear intact with no abnormalities noted   Neck:   No adenopathy, supple, trachea midline   Back:     No kyphosis present, no scoliosis present, no skin lesions,      erythema or scars, no tenderness  to percussion or                   palpation,  range of motion normal   Lungs:     Clear to auscultation,respirations regular, even and                  unlabored    Heart:    Regular rhythm and normal rate, normal S1 and S2, no            murmur, no gallop, no rub, no click   Chest Wall:    No abnormalities observed   Abdomen:    Tender epigastrium   Rectal:     Deferred   Extremities:   Moves all extremities well, no edema, no cyanosis, no             redness   Pulses:   Pulses palpable and equal bilaterally   Skin:   No bleeding, bruising or rash   Lymph nodes:   No palpable adenopathy   Neurologic:   No focal deficits       Lab Results (last 72 hours)     Procedure Component Value Units Date/Time    CBC & Differential [[id number]] Collected:  07/15/20 0445    Specimen:  Blood Updated:  07/15/20 0514    Narrative:       The following orders were created for panel order CBC & Differential.  Procedure                               Abnormality         Status                     ---------                               -----------         ------                     CBC Auto Differential[[id number]]        Abnormal            Final result                 Please view results for these tests on the individual orders.    CBC Auto Differential [[id number]]  (Abnormal) Collected:  07/15/20 0445    Specimen:  Blood Updated:  07/15/20 0514     WBC 9.43 10*3/mm3      RBC 4.11 10*6/mm3      Hemoglobin 12.4 g/dL      Hematocrit 37.6 %      MCV 91.5 fL      MCH 30.2 pg      MCHC 33.0 g/dL      RDW 15.0 %      RDW-SD 50.0 fl      MPV 9.0 fL      Platelets 477 10*3/mm3      Neutrophil % 48.0 %      Lymphocyte % 38.4 %      Monocyte % 9.3 %      Eosinophil % 2.8 %      Basophil % 1.2 %      Immature Grans % 0.3 %      Neutrophils, Absolute 4.53 10*3/mm3      Lymphocytes, Absolute 3.62 10*3/mm3      Monocytes, Absolute 0.88 10*3/mm3      Eosinophils, Absolute 0.26 10*3/mm3      Basophils, Absolute 0.11 10*3/mm3      Immature Grans,  Absolute 0.03 10*3/mm3      nRBC 0.0 /100 WBC     Blood Culture - Blood, Arm, Left [[id number]] Collected:  07/11/20 1315    Specimen:  Blood from Arm, Left Updated:  07/14/20 1415     Blood Culture No growth at 3 days    Blood Culture - Blood, Arm, Right [[id number]] Collected:  07/11/20 1137    Specimen:  Blood from Arm, Right Updated:  07/14/20 1200     Blood Culture No growth at 3 days    CBC & Differential [[id number]] Collected:  07/14/20 0421    Specimen:  Blood Updated:  07/14/20 0511    Narrative:       The following orders were created for panel order CBC & Differential.  Procedure                               Abnormality         Status                     ---------                               -----------         ------                     CBC Auto Differential[[id number]]        Abnormal            Final result                 Please view results for these tests on the individual orders.    CBC Auto Differential [[id number]]  (Abnormal) Collected:  07/14/20 0421    Specimen:  Blood Updated:  07/14/20 0511     WBC 11.03 10*3/mm3      RBC 4.36 10*6/mm3      Hemoglobin 13.1 g/dL      Hematocrit 38.7 %      MCV 88.8 fL      MCH 30.0 pg      MCHC 33.9 g/dL      RDW 14.7 %      RDW-SD 47.7 fl      MPV 8.8 fL      Platelets 487 10*3/mm3     Manual Differential [[id number]]  (Abnormal) Collected:  07/14/20 0421    Specimen:  Blood Updated:  07/14/20 0511     Neutrophil % 48.5 %      Lymphocyte % 37.4 %      Monocyte % 5.1 %      Eosinophil % 1.0 %      Basophil % 2.0 %      Bands %  1.0 %      Atypical Lymphocyte % 5.1 %      Neutrophils Absolute 5.46 10*3/mm3      Lymphocytes Absolute 4.13 10*3/mm3      Monocytes Absolute 0.56 10*3/mm3      Eosinophils Absolute 0.11 10*3/mm3      Basophils Absolute 0.22 10*3/mm3      Polychromasia Slight/1+     WBC Morphology Normal     Platelet Morphology Normal    Comprehensive Metabolic Panel [[id number]]  (Abnormal) Collected:  07/14/20 0421    Specimen:  Blood Updated:   07/14/20 0507     Glucose 108 mg/dL      BUN 8 mg/dL      Creatinine 0.56 mg/dL      Sodium 136 mmol/L      Potassium 4.0 mmol/L      Chloride 101 mmol/L      CO2 26.0 mmol/L      Calcium 8.8 mg/dL      Total Protein 6.2 g/dL      Albumin 3.90 g/dL      ALT (SGPT) 18 U/L      AST (SGOT) 17 U/L      Alkaline Phosphatase 75 U/L      Total Bilirubin 0.5 mg/dL      eGFR Non African Amer 109 mL/min/1.73      Globulin 2.3 gm/dL      A/G Ratio 1.7 g/dL      BUN/Creatinine Ratio 14.3     Anion Gap 9.0 mmol/L     Narrative:       GFR Normal >60  Chronic Kidney Disease <60  Kidney Failure <15      Lipase [[id number]]  (Normal) Collected:  07/14/20 0421    Specimen:  Blood Updated:  07/14/20 0507     Lipase 18 U/L     Basic Metabolic Panel [[id number]]  (Abnormal) Collected:  07/13/20 0534    Specimen:  Blood Updated:  07/13/20 0715     Glucose 143 mg/dL      BUN 8 mg/dL      Creatinine 0.53 mg/dL      Sodium 134 mmol/L      Potassium 3.7 mmol/L      Chloride 98 mmol/L      CO2 24.0 mmol/L      Calcium 8.6 mg/dL      eGFR Non African Amer 116 mL/min/1.73      BUN/Creatinine Ratio 15.1     Anion Gap 12.0 mmol/L     Narrative:       GFR Normal >60  Chronic Kidney Disease <60  Kidney Failure <15      CBC & Differential [[id number]] Collected:  07/13/20 0534    Specimen:  Blood Updated:  07/13/20 0643    Narrative:       The following orders were created for panel order CBC & Differential.  Procedure                               Abnormality         Status                     ---------                               -----------         ------                     CBC Auto Differential[[id number]]        Abnormal            Final result                 Please view results for these tests on the individual orders.    CBC Auto Differential [[id number]]  (Abnormal) Collected:  07/13/20 0534    Specimen:  Blood Updated:  07/13/20 0643     WBC 14.03 10*3/mm3      RBC 4.72 10*6/mm3      Hemoglobin 14.1 g/dL      Hematocrit 42.1 %      MCV 89.2  fL      MCH 29.9 pg      MCHC 33.5 g/dL      RDW 14.8 %      RDW-SD 48.3 fl      MPV 9.2 fL      Platelets 564 10*3/mm3      Neutrophil % 68.3 %      Lymphocyte % 20.7 %      Monocyte % 9.7 %      Eosinophil % 0.3 %      Basophil % 0.6 %      Immature Grans % 0.4 %      Neutrophils, Absolute 9.57 10*3/mm3      Lymphocytes, Absolute 2.91 10*3/mm3      Monocytes, Absolute 1.36 10*3/mm3      Eosinophils, Absolute 0.04 10*3/mm3      Basophils, Absolute 0.09 10*3/mm3      Immature Grans, Absolute 0.06 10*3/mm3      nRBC 0.0 /100 WBC         Imaging Results (Last 72 Hours)     Procedure Component Value Units Date/Time    NM Hepatobiliary Without CCK [[id number]] Collected:  07/14/20 1417     Updated:  07/14/20 1420    Narrative:       EXAMINATION:   NM HEPATOBILIARY WITHOUT CCK-  7/14/2020 2:17 PM CDT     HISTORY: NM HEPATOBILIARY WITHOUT CCK- 7/14/2020 11:00 AM CDT     Clinical History: Nausea, vomiting, diarrhea; E86.0-Dehydration;  K52.9-Noninfective gastroenteritis and colitis, unspecified     Comparison: None.      Radiopharmaceutical: 5.5 mCi technetium 99m Mebrofenin IV.      Technique: Anterior images were acquired over the upper abdomen for a  period of 60 minutes following intravenous administration of the  radiopharmaceutical.      Subsequently, the patient ingested a 1 ounce mixture of corn oil  emulsion, and additional images of the abdomen were obtained for 60  minutes. Gallbladder ejection fraction was calculated.      Findings:    There is prompt progression of the isotope from the liver into the  intrahepatic biliary ducts, common bile duct and gallbladder.  Radiotracer is noted in the small bowel at the end of 60 minutes.      Following ingestion of the corn oil emulsion, calculated gallbladder  ejection fraction is 25% (normal > 20%). There is further radiotracer  accumulation in the small bowel.        Impression:       Impression:   1. 25% biliary ejection fraction..     This report was finalized on  07/14/2020 14:17 by Dr. Obed Villavicencio MD.    US Gallbladder [[id number]] Collected:  07/13/20 1400     Updated:  07/13/20 1404    Narrative:       EXAMINATION: US GALLBLADDER-     7/13/2020 1:29 PM CDT     HISTORY: Abnormal CT and persistent pain, nausea; E86.0-Dehydration;  K52.9-Noninfective gastroenteritis and colitis, unspecified     Grayscale and color flow ultrasound evaluation of the right upper  quadrant.     Sludge-filled gallbladder.  No shadowing stones or gallbladder wall thickening.  No biliary dilation. CBD = 3-4 mm.     No focal liver abnormality is seen.     Summary:  1. Sludge-filled gallbladder with no shadowing stones or bile duct  dilation.  This report was finalized on 07/13/2020 14:01 by Dr. Jose Santiago MD.    CT Angiogram Abdomen Pelvis [[id number]] Collected:  07/13/20 1109     Updated:  07/13/20 1117    Narrative:       EXAMINATION: CT ANGIOGRAM ABDOMEN PELVIS-      7/13/2020 10:17 AM CDT     HISTORY: Persistent nausea and abdominal pain.; E86.0-Dehydration;  K52.9-Noninfective gastroenteritis and colitis, unspecified     In order to have a CT radiation dose as low as reasonably achievable  Automated Exposure Control was utilized for adjustment of the mA and/or  KV according to patient size.     DLP in mGycm= 399.     CT angiography protocol.   CT imaging with bolus IV contrast injection.   Under concurrent supervision axial, sagittal, coronal, and  three-dimensional data sets were constructed.     Noncontrast:  Hyperdense gallbladder compatible with sludge or tiny stones.  3 mm calcification within the mid right kidney.  Arterial calcification.  Pelvic phleboliths.     Postcontrast:  No aortic aneurysm or dissection.  Normally patent iliac and common femoral arteries.     Upper aorta = 23 x 24 mm.  Mid aorta = 19 x 19 mm.  Distal aorta = 19 x 17 mm.     Normally patent celiac artery, superior mesenteric artery, and renal  arteries.  Diminutive though patent inferior mesenteric artery.        No bowel dilation.  No mesenteric fluid or inflammation is seen.     Normal appearance of the liver and spleen.  Mildly atrophic pancreas noted.  Normal and symmetric kidneys.     Summary:  1. Hyperdense gallbladder compatible with sludge or tiny stones.  2. No aortic aneurysm or dissection.  Normal celiac artery and SMA.  3. No acute abnormality is seen within the abdomen or pelvis.                                   This report was finalized on 07/13/2020 11:14 by Dr. Jose Santiago MD.                Assessment/Plan       Viral gastroenteritis    Dehydration    COPD (chronic obstructive pulmonary disease) (CMS/HCC)    Leukocytosis    Lactic acidosis    Chronic pain syndrome    Tobacco abuse    Solitary pulmonary nodule    Biliary dyskinesia    Biliary sludge      We will proceed with lap scopic cholecystectomy.  The risks of bleeding infection injury to surrounding structures the possible gallbladder not entirely causing all of her symptoms were discussed with the patient.  She understands and wishes to proceed      Tanna Mclain MD  07/15/20  08:30

## 2020-07-15 NOTE — PAYOR COMM NOTE
"Nickolas Gil (64 y.o. Female) [id number]  Cont stay please review clinical   Pt remains in hospital   [facility]   [place]   pito phone    Fax        Date of Birth Social Security Number Address Home Phone MRN    1955  [address] [facility]  [address]  [place] [address] [phone] [id number]    Episcopalian Marital Status          Non-Jainism Single       Admission Date Admission Type Admitting Provider Attending Provider Department, Room/Bed    7/11/20 Emergency Matias Espino DO Hancock, John C, DO [facility] OR, PAD OR/NONE    Discharge Date Discharge Disposition Discharge Destination                       Attending Provider:  Matias Espino DO    Allergies:  No Known Allergies    Isolation:  None   Infection:  None   Code Status:  CPR    Ht:  165.1 cm (65\")   Wt:  43.5 kg (96 lb)    Admission Cmt:  None   Principal Problem:  Viral gastroenteritis [A08.4]                 Active Insurance as of 7/11/2020     Primary Coverage     Payor Plan Insurance Group Employer/Plan Group    [employer] [place] [employer] [place]      Payor Plan Address Payor Plan Phone Number Payor Plan Fax Number Effective Dates    [address] [address] [phone]  3/21/2019 - None Entered    [facility] [id number]       Subscriber Name Subscriber Birth Date Member ID       NICKOLAS GIL 1955 [id number]                 Emergency Contacts      (Rel.) Home Phone Work Phone Mobile Phone    Eva Hodge (Son) [phone] -- --    Marisol Lucas (Friend) -- -- [phone]    Oumar Linder (Friend) [phone] -- --              Current Facility-Administered Medications   Medication Dose Route Frequency Provider Last Rate Last Dose   • [MAR Hold] acetaminophen (TYLENOL) tablet 650 mg  650 mg Oral Q4H PRN Kolby Butler DO       • [MAR Hold] albuterol (PROVENTIL) nebulizer solution 0.083% 2.5 mg/3mL  2.5 mg Nebulization Q4H PRN Jenaro Hansen MD       • " [MAR Hold] aluminum-magnesium hydroxide-simethicone (MAALOX MAX) 400-400-40 MG/5ML suspension 15 mL  15 mL Oral Q6H PRN Kolby Butler DO       • atropine sulfate injection 0.5 mg  0.5 mg Intravenous Once PRN Jonathan Edmondson MD       • [MAR Hold] budesonide-formoterol (SYMBICORT) 160-4.5 MCG/ACT inhaler 2 puff  2 puff Inhalation BID - RT Kolby Butler DO   2 puff at 07/15/20 0724   • famotidine (PEPCID) injection 20 mg  20 mg Intravenous Q12H Matias Espino DO   20 mg at 07/15/20 0803   • fentaNYL citrate (PF) (SUBLIMAZE) injection 25 mcg  25 mcg Intravenous Q5 Min PRN Jonathan Edmondson MD   25 mcg at 07/15/20 1020   • flumazenil (ROMAZICON) injection 0.2 mg  0.2 mg Intravenous PRN Jonathan Edmondson MD       • [MAR Hold] HYDROcodone-acetaminophen (NORCO)  MG per tablet 1 tablet  1 tablet Oral Q4H PRN Kolby Butler DO   1 tablet at 07/14/20 2144   • [MAR Hold] HYDROcodone-acetaminophen (NORCO) 5-325 MG per tablet 1 tablet  1 tablet Oral Q4H PRN Kolby Butler DO   1 tablet at 07/12/20 1609   • HYDROmorphone (DILAUDID) injection 0.5 mg  0.5 mg Intravenous Q5 Min PRN Jonathan Edmondson MD   0.5 mg at 07/15/20 1050   • labetalol (NORMODYNE,TRANDATE) injection 5 mg  5 mg Intravenous Q5 Min PRN Jonathan Edmondson MD       • lactated ringers infusion  100 mL/hr Intravenous Continuous Jonathan Edmondson  mL/hr at 07/15/20 1055 100 mL/hr at 07/15/20 1055   • Morphine sulfate (PF) injection 2 mg  2 mg Intravenous Q10 Min PRN Jonathan Edmondson MD       • naloxone (NARCAN) injection 0.04 mg  0.04 mg Intravenous PRN Jonathan Edmondson MD       • [MAR Hold] nicotine (NICODERM CQ) 14 MG/24HR patch 1 patch  1 patch Transdermal Q24H Matias Espino DO   1 patch at 07/15/20 0802   • [MAR Hold] ondansetron (ZOFRAN) injection 4 mg  4 mg Intravenous Q6H PRN Kolby Butler DO   4 mg at 07/13/20 1034   • ondansetron (ZOFRAN) injection 4 mg  4 mg Intravenous PRN Debo  Jonathan Peters MD       • oxyCODONE-acetaminophen (PERCOCET)  MG per tablet 1 tablet  1 tablet Oral Once PRN Jonathan Edmondson MD       • oxyCODONE-acetaminophen (PERCOCET) 7.5-325 MG per tablet 2 tablet  2 tablet Oral Once PRN Jonathan Edmondson MD       • [MAR Hold] sodium chloride 0.9 % flush 10 mL  10 mL Intravenous PRN Kolby Butler S, DO       • [MAR Hold] sodium chloride 0.9 % flush 10 mL  10 mL Intravenous Q12H Butler Kolby S, DO   10 mL at 07/15/20 0803   • [MAR Hold] sodium chloride 0.9 % flush 10 mL  10 mL Intravenous PRN Liza Butlerurice S, DO       • sodium chloride 0.9 % with KCl 20 mEq/L infusion  75 mL/hr Intravenous Continuous Matias Espino DO 75 mL/hr at 07/14/20 0739 75 mL/hr at 07/14/20 0739        Operative/Procedure Notes (last 24 hours) (Notes from 07/14/20 1151 through 07/15/20 1151)      Tanna Mclain MD at 07/15/20 0819          CHOLECYSTECTOMY LAPAROSCOPIC INTRAOPERATIVE CHOLANGIOGRAM  Procedure Note    [facility]  7/15/2020    Pre-op Diagnosis:   * No pre-op diagnosis entered *    Post-op Diagnosis:     same    Procedure/CPT® Codes:  Laparoscopic cholecystectomy    Procedure(s):  CHOLECYSTECTOMY LAPAROSCOPIC    Surgeon(s):  Tanna Mclain MD    Anesthesia: General    Staff:   Circulator: Bradley Mott RN  Scrub Person: Gardenia Crook; Cathryn Sidhu    Estimated Blood Loss:minimal    Specimens:                Specimens     ID Source Type Tests Collected By Collected At Frozen?      A Gallbladder Tissue · TISSUE PATHOLOGY EXAM   Tanna Mclain MD 7/15/20 09 No     Description: Gallbladder and Contents    This specimen was not marked as sent.            Drains: * No LDAs found *    Findings: Enlarged liver.  Gallbladder very distended.    Complications none          Date: 7/15/2020  Time: 09:40        The patient was brought to the operating room and placed in supine position. After induction of anesthesia and infusion of antibiotics, the patient was  prepped and draped in the usual sterile fashion. Versed needle technique was used. Standard 4 ports were placed.  Upon entry there was some blood noted in the region of the liver.  There was a small punctate area of bleeding from the Veress needle.  This was controlled with cautery.  The gallbladder was grasped and retracted superiorly. The infundibulum was grasped and retracted laterally.  The liver was quite enlarged and the liver was distended which made surgery a little more difficult but ultimately able to get everything exposed the cystic duct and cystic artery were identified, isolated, divided between clips. The gallbladder was removed from its bed using electrocautery, placed in Endo bag and removed through the epigastric port. Irrigation was done until all clear. All ports were removed. Fascia at the 10 mm port site was closed with Vicryl. The skin was reapproximated with #4-0 subcuticular. Then 0.5% Marcaine injected for local anesthesia. Dressing was placed. The patient was awakened and transferred to the recovery room in stable condition. He tolerated the procedure well. At the end of the procedure, all counts were correct.    Tanna Mclain MD     Electronically signed by Tanna Mclain MD at 07/15/20 0941          Physician Progress Notes (last 24 hours) (Notes from 07/14/20 1151 through 07/15/20 1151)      Matias Espino DO at 07/14/20 1446              [facility]  INPATIENT PROGRESS NOTE    Patient Name: Tea Sams  Date of Admission: 7/11/2020  Today's Date: 07/14/20  Length of Stay: 3  Primary Care Physician: Jovi Arita Jr., MD    Subjective   Chief Complaint: Midepigastric pain.  HPI   She is no longer nauseous.  She actually wants something to eat and drink very badly.  She still has pain at times.  She has biliary sludge and also likely has some degree of biliary dyskinesia.  I have asked general surgery to evaluate her for a  possible laparoscopic cholecystectomy.    Also discussed in detail with her nurse, Maranda.    Review of Systems   All pertinent negatives and positives are as above. All other systems have been reviewed and are negative unless otherwise stated.     Objective    Temp:  [97.9 °F (36.6 °C)-99 °F (37.2 °C)] 97.9 °F (36.6 °C)  Heart Rate:  [] 78  Resp:  [16] 16  BP: ()/(55-96) 109/73  Physical Exam  Constitutional: She is oriented to person, place, and time.   Up in bed.  Acutely on chronically ill-appearing.  No family present. Discussed with her nurse, Maranda.   Head: Normocephalic and atraumatic.   Eyes: Pupils are equal, round, and reactive to light. Conjunctivae and EOM are normal.   Neck: Neck supple. No JVD present.   Cardiovascular: Normal rate and regular rhythm.   Pulmonary/Chest: Effort normal. No respiratory distress.   Abdominal: Soft. Bowel sounds are normal. She exhibits no distension. There is tenderness (DEB). There is no rebound and no guarding.   Musculoskeletal: Normal range of motion. She exhibits no edema, tenderness or deformity.   Neurological: She is alert and oriented to person, place, and time. She displays normal reflexes. No cranial nerve deficit. She exhibits normal muscle tone. Generally weak, but nonfocal.   Skin: Skin is warm and dry. No rash noted.   Psychiatric:  Brighter affect today.     Results Review:  I have reviewed the labs, radiology results, and diagnostic studies.    Laboratory Data:   Results from last 7 days   Lab Units 07/14/20 0421 07/13/20 0534 07/12/20  0609   WBC 10*3/mm3 11.03* 14.03* 17.23*   HEMOGLOBIN g/dL 13.1 14.1 13.3   HEMATOCRIT % 38.7 42.1 39.9   PLATELETS 10*3/mm3 487* 564* 573*     Results from last 7 days   Lab Units 07/14/20 0421 07/13/20  0534 07/12/20  0609 07/11/20  1137   SODIUM mmol/L 136 134* 134* 134*   POTASSIUM mmol/L 4.0 3.7 3.7 3.6   CHLORIDE mmol/L 101 98 101 94*   CO2 mmol/L 26.0 24.0 22.0 23.0   BUN mg/dL 8 8 11 21      CREATININE mg/dL 0.56* 0.53* 0.58 0.65   CALCIUM mg/dL 8.8 8.6 8.4* 10.3   BILIRUBIN mg/dL 0.5  --  0.6 0.6   ALK PHOS U/L 75  --  93 132*   ALT (SGPT) U/L 18  --  18 30   AST (SGOT) U/L 17  --  14 22   GLUCOSE mg/dL 108* 143* 134* 178*     Culture Data:   Blood Culture   Date Value Ref Range Status   07/11/2020 No growth at 3 days  Preliminary   07/11/2020 No growth at 3 days  Preliminary     Radiology Data:   Imaging Results (Last 24 Hours)     Procedure Component Value Units Date/Time    NM Hepatobiliary Without CCK [[id number]] Collected:  07/14/20 1417     Updated:  07/14/20 1420    Narrative:       EXAMINATION:   NM HEPATOBILIARY WITHOUT CCK-  7/14/2020 2:17 PM CDT     HISTORY: NM HEPATOBILIARY WITHOUT CCK- 7/14/2020 11:00 AM CDT     Clinical History: Nausea, vomiting, diarrhea; E86.0-Dehydration;  K52.9-Noninfective gastroenteritis and colitis, unspecified     Comparison: None.      Radiopharmaceutical: 5.5 mCi technetium 99m Mebrofenin IV.      Technique: Anterior images were acquired over the upper abdomen for a  period of 60 minutes following intravenous administration of the  radiopharmaceutical.      Subsequently, the patient ingested a 1 ounce mixture of corn oil  emulsion, and additional images of the abdomen were obtained for 60  minutes. Gallbladder ejection fraction was calculated.      Findings:    There is prompt progression of the isotope from the liver into the  intrahepatic biliary ducts, common bile duct and gallbladder.  Radiotracer is noted in the small bowel at the end of 60 minutes.      Following ingestion of the corn oil emulsion, calculated gallbladder  ejection fraction is 25% (normal > 20%). There is further radiotracer  accumulation in the small bowel.        Impression:       Impression:   1. 25% biliary ejection fraction..     This report was finalized on 07/14/2020 14:17 by Dr. Obed Villavicencio MD.        I have reviewed the patient's current medications.     Assessment/Plan      Active Hospital Problems    Diagnosis   • **Viral gastroenteritis   • Biliary dyskinesia   • Biliary sludge   • Dehydration   • Lactic acidosis   • Leukocytosis   • Chronic pain syndrome   • Tobacco abuse   • Solitary pulmonary nodule   • COPD (chronic obstructive pulmonary disease) (CMS/HCC)     Plan:  The patient was admitted on 7/11 by Dr. Butler.  She presented to the emergency department after having problems with vomiting and abdominal pain followed by a loose stool for several days duration.  She was felt to have viral gastroenteritis.  She was admitted secondary to volume depletion.  She has been unable to produce a stool sample since being admitted.  She remains persistently nauseous with severe midepigastric pain today.     Original CT from 7/11 failed to show any acute intra-abdominal or pelvic abnormality.  CT angiogram of the abdomen and pelvis on 7/13 showed a hyperdense gallbladder compatible with sludge or tiny stones.  Normal celiac artery and SMA.  No acute abnormality seen within the abdomen or pelvis.  I followed this with a gallbladder ultrasound that confirmed it to be filled with sludge, but no shadowing stones or bile duct dilation.  A HIDA scan today shows an EF of 25%.  I have consulted general surgery to consider cholecystectomy.     Continue pain control and anti-emetics.  Continue hydration; decrease.     IV Pepcid.     Offered a nicotine patch and counseled for tobacco cessation.  Continue Symbicort.  She has a solitary pulmonary nodule on CT.  She will need this followed by her primary care physician in 3-6 months.     SCDs for DVT prophylaxis.     Discharge Planning: Depends on general surgery evaluation.    Matias Espino DO   07/14/20   14:46      Electronically signed by Matias Espino DO at 07/14/20 1456          Consult Notes (last 24 hours) (Notes from 07/14/20 1151 through 07/15/20 1151)      Tanna Mclain MD at 07/15/20 4283          Tanna Mclain MD Consult  Note      Patient Care Team:  Jovi Arita Jr., MD as PCP - General (Family Medicine)    Chief complaint nausea vomiting abdominal pain for approximately 1 week    Subjective .     History of present illness: Last week she began developing significant diarrhea followed by nausea vomiting and abdominal pain in her upper abdomen.  She denied fevers.  No blood in her emesis or her stool.  She has not really been able to keep anything down.  Ultimately came to the ER and was admitted for these above symptoms.  She states she has had on and off symptoms like this for years but this was the most severe attack    Review of Systems  Pertinent items are noted in HPI, all other systems reviewed and negative    History  Past Medical History:   Diagnosis Date   • Arthritis    • COPD (chronic obstructive pulmonary disease) (CMS/HCC)    • GERD (gastroesophageal reflux disease)    , Past Surgical History:   Procedure Laterality Date   • ABDOMINAL SURGERY     •  SECTION     • ENDOSCOPY N/A 10/20/2017    Procedure: ESOPHAGOGASTRODUODENOSCOPY WITH ANESTHESIA;  Surgeon: Femi Goddard MD;  Location: [facility] OR;  Service:    • HERNIA REPAIR     • TONSILLECTOMY     , Family History   Problem Relation Age of Onset   • Cancer Mother    • Cancer Father    , Social History     Tobacco Use   • Smoking status: Current Every Day Smoker     Packs/day: 1.00     Years: 40.00     Pack years: 40.00     Types: Cigarettes   • Smokeless tobacco: Never Used   Substance Use Topics   • Alcohol use: No   • Drug use: No   , Medications Prior to Admission   Medication Sig Dispense Refill Last Dose   • albuterol sulfate  (90 Base) MCG/ACT inhaler Inhale 2 puffs by mouth Every 4 (Four) Hours As Needed for Wheezing. 8.5 g 0 Past Week at Unknown time   • budesonide-formoterol (SYMBICORT) 160-4.5 MCG/ACT inhaler Inhale 2 puffs 2 (Two) Times a Day. 1 inhaler 0 7/10/2020 at Unknown time   • buprenorphine-naloxone (SUBOXONE) 8-2 MG per SL tablet  Place 1.5 tablets under the tongue Daily.   7/10/2020 at Unknown time   , Scheduled Meds:    acetaminophen 1,000 mg Oral Once   [MAR Hold] budesonide-formoterol 2 puff Inhalation BID - RT   ceFAZolin 1 g Intravenous Once   famotidine 20 mg Intravenous Q12H   [MAR Hold] nicotine 1 patch Transdermal Q24H   [MAR Hold] sodium chloride 10 mL Intravenous Q12H   sodium chloride 3 mL Intravenous Q12H   , Continuous Infusions:    lactated ringers 100 mL/hr    sodium chloride 0.9 % with KCl 20 mEq 75 mL/hr Last Rate: 75 mL/hr (07/14/20 0739)   , PRN Meds:  [MAR Hold] acetaminophen  •  [MAR Hold] albuterol  •  [MAR Hold] aluminum-magnesium hydroxide-simethicone  •  dexamethasone  •  [MAR Hold] HYDROcodone-acetaminophen  •  [MAR Hold] HYDROcodone-acetaminophen  •  lidocaine PF 1%  •  midazolam  •  [MAR Hold] ondansetron  •  [MAR Hold] sodium chloride  •  [MAR Hold] sodium chloride  •  sodium chloride and Allergies:  Patient has no known allergies.    Objective     Vital Signs   Temp:  [97.7 °F (36.5 °C)-98.5 °F (36.9 °C)] 98.3 °F (36.8 °C)  Heart Rate:  [65-89] 78  Resp:  [16-18] 18  BP: ()/(48-94) 105/55    Intake & Output (last 3 days)       07/12 0701 - 07/13 0700 07/13 0701 - 07/14 0700 07/14 0701 - 07/15 0700 07/15 0701 - 07/16 0700    P.O. 360 240      I.V. (mL/kg)  4000 (92)      Total Intake(mL/kg) 360 (8.3) 4240 (97.5)      Net +360 +4240              Urine Unmeasured Occurrence 5 x 6 x 7 x            Physical Exam:     General Appearance:    Alert, cooperative, in no acute distress   Head:    Normocephalic, without obvious abnormality, atraumatic   Eyes:            Lids and lashes normal, conjunctivae and sclerae normal, no   icterus, no pallor, corneas clear, PERRLA   Ears:    Ears appear intact with no abnormalities noted   Neck:   No adenopathy, supple, trachea midline   Back:     No kyphosis present, no scoliosis present, no skin lesions,      erythema or scars, no tenderness to percussion or                    palpation,  range of motion normal   Lungs:     Clear to auscultation,respirations regular, even and                  unlabored    Heart:    Regular rhythm and normal rate, normal S1 and S2, no            murmur, no gallop, no rub, no click   Chest Wall:    No abnormalities observed   Abdomen:    Tender epigastrium   Rectal:     Deferred   Extremities:   Moves all extremities well, no edema, no cyanosis, no             redness   Pulses:   Pulses palpable and equal bilaterally   Skin:   No bleeding, bruising or rash   Lymph nodes:   No palpable adenopathy   Neurologic:   No focal deficits       Lab Results (last 72 hours)     Procedure Component Value Units Date/Time    CBC & Differential [[id number]] Collected:  07/15/20 0445    Specimen:  Blood Updated:  07/15/20 0514    Narrative:       The following orders were created for panel order CBC & Differential.  Procedure                               Abnormality         Status                     ---------                               -----------         ------                     CBC Auto Differential[[id number]]        Abnormal            Final result                 Please view results for these tests on the individual orders.    CBC Auto Differential [[id number]]  (Abnormal) Collected:  07/15/20 0445    Specimen:  Blood Updated:  07/15/20 0514     WBC 9.43 10*3/mm3      RBC 4.11 10*6/mm3      Hemoglobin 12.4 g/dL      Hematocrit 37.6 %      MCV 91.5 fL      MCH 30.2 pg      MCHC 33.0 g/dL      RDW 15.0 %      RDW-SD 50.0 fl      MPV 9.0 fL      Platelets 477 10*3/mm3      Neutrophil % 48.0 %      Lymphocyte % 38.4 %      Monocyte % 9.3 %      Eosinophil % 2.8 %      Basophil % 1.2 %      Immature Grans % 0.3 %      Neutrophils, Absolute 4.53 10*3/mm3      Lymphocytes, Absolute 3.62 10*3/mm3      Monocytes, Absolute 0.88 10*3/mm3      Eosinophils, Absolute 0.26 10*3/mm3      Basophils, Absolute 0.11 10*3/mm3      Immature Grans, Absolute 0.03 10*3/mm3      nRBC 0.0  /100 WBC     Blood Culture - Blood, Arm, Left [[id number]] Collected:  07/11/20 1315    Specimen:  Blood from Arm, Left Updated:  07/14/20 1415     Blood Culture No growth at 3 days    Blood Culture - Blood, Arm, Right [[id number]] Collected:  07/11/20 1137    Specimen:  Blood from Arm, Right Updated:  07/14/20 1200     Blood Culture No growth at 3 days    CBC & Differential [[id number]] Collected:  07/14/20 0421    Specimen:  Blood Updated:  07/14/20 0511    Narrative:       The following orders were created for panel order CBC & Differential.  Procedure                               Abnormality         Status                     ---------                               -----------         ------                     CBC Auto Differential[[id number]]        Abnormal            Final result                 Please view results for these tests on the individual orders.    CBC Auto Differential [[id number]]  (Abnormal) Collected:  07/14/20 0421    Specimen:  Blood Updated:  07/14/20 0511     WBC 11.03 10*3/mm3      RBC 4.36 10*6/mm3      Hemoglobin 13.1 g/dL      Hematocrit 38.7 %      MCV 88.8 fL      MCH 30.0 pg      MCHC 33.9 g/dL      RDW 14.7 %      RDW-SD 47.7 fl      MPV 8.8 fL      Platelets 487 10*3/mm3     Manual Differential [[id number]]  (Abnormal) Collected:  07/14/20 0421    Specimen:  Blood Updated:  07/14/20 0511     Neutrophil % 48.5 %      Lymphocyte % 37.4 %      Monocyte % 5.1 %      Eosinophil % 1.0 %      Basophil % 2.0 %      Bands %  1.0 %      Atypical Lymphocyte % 5.1 %      Neutrophils Absolute 5.46 10*3/mm3      Lymphocytes Absolute 4.13 10*3/mm3      Monocytes Absolute 0.56 10*3/mm3      Eosinophils Absolute 0.11 10*3/mm3      Basophils Absolute 0.22 10*3/mm3      Polychromasia Slight/1+     WBC Morphology Normal     Platelet Morphology Normal    Comprehensive Metabolic Panel [[id number]]  (Abnormal) Collected:  07/14/20 0421    Specimen:  Blood Updated:  07/14/20 0507     Glucose 108 mg/dL      BUN  8 mg/dL      Creatinine 0.56 mg/dL      Sodium 136 mmol/L      Potassium 4.0 mmol/L      Chloride 101 mmol/L      CO2 26.0 mmol/L      Calcium 8.8 mg/dL      Total Protein 6.2 g/dL      Albumin 3.90 g/dL      ALT (SGPT) 18 U/L      AST (SGOT) 17 U/L      Alkaline Phosphatase 75 U/L      Total Bilirubin 0.5 mg/dL      eGFR Non African Amer 109 mL/min/1.73      Globulin 2.3 gm/dL      A/G Ratio 1.7 g/dL      BUN/Creatinine Ratio 14.3     Anion Gap 9.0 mmol/L     Narrative:       GFR Normal >60  Chronic Kidney Disease <60  Kidney Failure <15      Lipase [[id number]]  (Normal) Collected:  07/14/20 0421    Specimen:  Blood Updated:  07/14/20 0507     Lipase 18 U/L     Basic Metabolic Panel [[id number]]  (Abnormal) Collected:  07/13/20 0534    Specimen:  Blood Updated:  07/13/20 0715     Glucose 143 mg/dL      BUN 8 mg/dL      Creatinine 0.53 mg/dL      Sodium 134 mmol/L      Potassium 3.7 mmol/L      Chloride 98 mmol/L      CO2 24.0 mmol/L      Calcium 8.6 mg/dL      eGFR Non African Amer 116 mL/min/1.73      BUN/Creatinine Ratio 15.1     Anion Gap 12.0 mmol/L     Narrative:       GFR Normal >60  Chronic Kidney Disease <60  Kidney Failure <15      CBC & Differential [[id number]] Collected:  07/13/20 0534    Specimen:  Blood Updated:  07/13/20 0643    Narrative:       The following orders were created for panel order CBC & Differential.  Procedure                               Abnormality         Status                     ---------                               -----------         ------                     CBC Auto Differential[[id number]]        Abnormal            Final result                 Please view results for these tests on the individual orders.    CBC Auto Differential [[id number]]  (Abnormal) Collected:  07/13/20 0534    Specimen:  Blood Updated:  07/13/20 0643     WBC 14.03 10*3/mm3      RBC 4.72 10*6/mm3      Hemoglobin 14.1 g/dL      Hematocrit 42.1 %      MCV 89.2 fL      MCH 29.9 pg      MCHC 33.5 g/dL       RDW 14.8 %      RDW-SD 48.3 fl      MPV 9.2 fL      Platelets 564 10*3/mm3      Neutrophil % 68.3 %      Lymphocyte % 20.7 %      Monocyte % 9.7 %      Eosinophil % 0.3 %      Basophil % 0.6 %      Immature Grans % 0.4 %      Neutrophils, Absolute 9.57 10*3/mm3      Lymphocytes, Absolute 2.91 10*3/mm3      Monocytes, Absolute 1.36 10*3/mm3      Eosinophils, Absolute 0.04 10*3/mm3      Basophils, Absolute 0.09 10*3/mm3      Immature Grans, Absolute 0.06 10*3/mm3      nRBC 0.0 /100 WBC         Imaging Results (Last 72 Hours)     Procedure Component Value Units Date/Time    NM Hepatobiliary Without CCK [[id number]] Collected:  07/14/20 1417     Updated:  07/14/20 1420    Narrative:       EXAMINATION:   NM HEPATOBILIARY WITHOUT CCK-  7/14/2020 2:17 PM CDT     HISTORY: NM HEPATOBILIARY WITHOUT CCK- 7/14/2020 11:00 AM CDT     Clinical History: Nausea, vomiting, diarrhea; E86.0-Dehydration;  K52.9-Noninfective gastroenteritis and colitis, unspecified     Comparison: None.      Radiopharmaceutical: 5.5 mCi technetium 99m Mebrofenin IV.      Technique: Anterior images were acquired over the upper abdomen for a  period of 60 minutes following intravenous administration of the  radiopharmaceutical.      Subsequently, the patient ingested a 1 ounce mixture of corn oil  emulsion, and additional images of the abdomen were obtained for 60  minutes. Gallbladder ejection fraction was calculated.      Findings:    There is prompt progression of the isotope from the liver into the  intrahepatic biliary ducts, common bile duct and gallbladder.  Radiotracer is noted in the small bowel at the end of 60 minutes.      Following ingestion of the corn oil emulsion, calculated gallbladder  ejection fraction is 25% (normal > 20%). There is further radiotracer  accumulation in the small bowel.        Impression:       Impression:   1. 25% biliary ejection fraction..     This report was finalized on 07/14/2020 14:17 by Dr. Obed Villavicencio MD.     US Gallbladder [[id number]] Collected:  07/13/20 1400     Updated:  07/13/20 1404    Narrative:       EXAMINATION: US GALLBLADDER-     7/13/2020 1:29 PM CDT     HISTORY: Abnormal CT and persistent pain, nausea; E86.0-Dehydration;  K52.9-Noninfective gastroenteritis and colitis, unspecified     Grayscale and color flow ultrasound evaluation of the right upper  quadrant.     Sludge-filled gallbladder.  No shadowing stones or gallbladder wall thickening.  No biliary dilation. CBD = 3-4 mm.     No focal liver abnormality is seen.     Summary:  1. Sludge-filled gallbladder with no shadowing stones or bile duct  dilation.  This report was finalized on 07/13/2020 14:01 by Dr. Jose Santiago MD.    CT Angiogram Abdomen Pelvis [[id number]] Collected:  07/13/20 1109     Updated:  07/13/20 1117    Narrative:       EXAMINATION: CT ANGIOGRAM ABDOMEN PELVIS-      7/13/2020 10:17 AM CDT     HISTORY: Persistent nausea and abdominal pain.; E86.0-Dehydration;  K52.9-Noninfective gastroenteritis and colitis, unspecified     In order to have a CT radiation dose as low as reasonably achievable  Automated Exposure Control was utilized for adjustment of the mA and/or  KV according to patient size.     DLP in mGycm= 399.     CT angiography protocol.   CT imaging with bolus IV contrast injection.   Under concurrent supervision axial, sagittal, coronal, and  three-dimensional data sets were constructed.     Noncontrast:  Hyperdense gallbladder compatible with sludge or tiny stones.  3 mm calcification within the mid right kidney.  Arterial calcification.  Pelvic phleboliths.     Postcontrast:  No aortic aneurysm or dissection.  Normally patent iliac and common femoral arteries.     Upper aorta = 23 x 24 mm.  Mid aorta = 19 x 19 mm.  Distal aorta = 19 x 17 mm.     Normally patent celiac artery, superior mesenteric artery, and renal  arteries.  Diminutive though patent inferior mesenteric artery.     No bowel dilation.  No mesenteric fluid or  inflammation is seen.     Normal appearance of the liver and spleen.  Mildly atrophic pancreas noted.  Normal and symmetric kidneys.     Summary:  1. Hyperdense gallbladder compatible with sludge or tiny stones.  2. No aortic aneurysm or dissection.  Normal celiac artery and SMA.  3. No acute abnormality is seen within the abdomen or pelvis.                                   This report was finalized on 07/13/2020 11:14 by Dr. Jose Santiago MD.                Assessment/Plan       Viral gastroenteritis    Dehydration    COPD (chronic obstructive pulmonary disease) (CMS/HCC)    Leukocytosis    Lactic acidosis    Chronic pain syndrome    Tobacco abuse    Solitary pulmonary nodule    Biliary dyskinesia    Biliary sludge      We will proceed with lap scopic cholecystectomy.  The risks of bleeding infection injury to surrounding structures the possible gallbladder not entirely causing all of her symptoms were discussed with the patient.  She understands and wishes to proceed      Tanna Mclain MD  07/15/20  08:30              Electronically signed by Tanna Mclain MD at 07/15/20 0833         7/15 so far prn meds  Dilaudid iv x2    zofran iv x1.

## 2020-07-15 NOTE — ANESTHESIA PREPROCEDURE EVALUATION
Anesthesia Evaluation     Patient summary reviewed   no history of anesthetic complications:  NPO Solid Status: > 8 hours  NPO Liquid Status: > 8 hours           Airway   Mallampati: III  TM distance: >3 FB  Neck ROM: full  Dental    (+) poor dentition        Pulmonary - normal exam    breath sounds clear to auscultation  (+) a smoker (1 ppd) Current Abstained day of surgery, COPD,   (-) asthma, recent URI, sleep apnea, no home oxygen  Cardiovascular - normal exam  Exercise tolerance: good (4-7 METS)    ECG reviewed  Rhythm: regular  Rate: normal    (-) pacemaker, hypertension, past MI, angina, cardiac stents, CABG      Neuro/Psych  (-) seizures, TIA, CVA  GI/Hepatic/Renal/Endo    (+)  GERD,    (-) liver disease, no renal disease, diabetes    Musculoskeletal     Abdominal    Substance History      OB/GYN          Other                        Anesthesia Plan    ASA 2     general     intravenous induction     Anesthetic plan, all risks, benefits, and alternatives have been provided, discussed and informed consent has been obtained with: patient.

## 2020-07-15 NOTE — PLAN OF CARE
Problem: Patient Care Overview  Goal: Plan of Care Review  Outcome: Ongoing (interventions implemented as appropriate)  Flowsheets  Taken 7/15/2020 0331 by Trice Mosquera RNA  Progress: improving  Taken 7/15/2020 0826 by Hina Rinaldi, RN  Plan of Care Reviewed With: patient  Taken 7/15/2020 1501 by Caro Jackson RN  Outcome Summary: Pt had a lap cholecystectomy this am per Dr. Andrade. 4 stabs with gauze and tegaderm. C/o much pain and po pain med given. Ate regular diet and then complained of nausea. Zofran given x 1. Voiding.

## 2020-07-15 NOTE — ANESTHESIA PROCEDURE NOTES
Airway  Urgency: elective    Date/Time: 7/15/2020 9:07 AM  Airway not difficult    General Information and Staff    Patient location during procedure: OR  CRNA: Nidhi Vaughn CRNA    Indications and Patient Condition  Indications for airway management: airway protection    Preoxygenated: yes  Mask difficulty assessment: 1 - vent by mask    Final Airway Details  Final airway type: endotracheal airway      Successful airway: ETT  Cuffed: yes   Successful intubation technique: direct laryngoscopy and video laryngoscopy  Facilitating devices/methods: intubating stylet  Endotracheal tube insertion site: oral  Blade: Huntley  Blade size: 3  ETT size (mm): 7.0  Cormack-Lehane Classification: grade I - full view of glottis  Placement verified by: chest auscultation and capnometry   Cuff volume (mL): 4  Measured from: lips  ETT/EBT  to lips (cm): 21  Number of attempts at approach: 1  Assessment: lips, teeth, and gum same as pre-op and atraumatic intubation

## 2020-07-15 NOTE — PLAN OF CARE
Problem: Patient Care Overview  Goal: Plan of Care Review  Outcome: Ongoing (interventions implemented as appropriate)  Flowsheets (Taken 7/15/2020 5290)  Progress: improving  Plan of Care Reviewed With: patient  Outcome Summary:     Pt medicated for pain x1 so far this shift. IVF cont to infuse per order. Pt to go for lap russel later today. NPO since 0000 for surg. Voiding per BRP. Up ad kosta. No c/o n/v thus far this shift. VSS. Will cont to monitor.

## 2020-07-15 NOTE — PROGRESS NOTES
"    [facility] Medicine Services  INPATIENT PROGRESS NOTE    Patient Name: Tea Sams  Date of Admission: 7/11/2020  Today's Date: 07/15/20  Length of Stay: 4  Primary Care Physician: Jovi Arita Jr., MD    Subjective   Chief Complaint: Nausea.  HPI   She had her laparoscopic cholecystectomy this morning.  She states that things were fine shortly after that.  She she was ordered regular diet by general surgery.  She admittedly ate \"too much, too fast.\"  She is now very nauseous and feels that she may throw up.  She is reluctant to go home this evening.  I told her that she would go home in the morning.  She is agreeable.    Review of Systems:  All pertinent negatives and positives are as above. All other systems have been reviewed and are negative unless otherwise stated.     Objective    Temp:  [97.3 °F (36.3 °C)-98.6 °F (37 °C)] 98.6 °F (37 °C)  Heart Rate:  [] 92  Resp:  [14-24] 16  BP: ()/(48-97) 108/55  Physical Exam  Constitutional: She is oriented to person, place, and time.   Up in bed.  Actively nauseous at present. No family present. Discussed with her nurse, Caro.   Head: Normocephalic and atraumatic.   Eyes: Pupils are equal, round, and reactive to light. Conjunctivae and EOM are normal.   Neck: Neck supple. No JVD present.   Cardiovascular: Normal rate and regular rhythm.   Pulmonary/Chest: Effort normal. No respiratory distress.   Abdominal: Soft. Bowel sounds are normal. She exhibits no distension. There is tenderness (DEB). There is no rebound and no guarding.  Laparoscopic incisions dressed.  No drain placed.  Musculoskeletal: Normal range of motion. She exhibits no edema, tenderness or deformity.   Neurological: She is alert and oriented to person, place, and time. She displays normal reflexes. No cranial nerve deficit. She exhibits normal muscle tone. Generally weak, but nonfocal.   Skin: Skin is warm and dry. No rash noted.   Psychiatric:  " Brighter affect today.     Results Review:  I have reviewed the labs, radiology results, and diagnostic studies.    Laboratory Data:   Results from last 7 days   Lab Units 07/15/20  0445 07/14/20  0421 07/13/20  0534   WBC 10*3/mm3 9.43 11.03* 14.03*   HEMOGLOBIN g/dL 12.4 13.1 14.1   HEMATOCRIT % 37.6 38.7 42.1   PLATELETS 10*3/mm3 477* 487* 564*     I have reviewed the patient's current medications.     Assessment/Plan     Active Hospital Problems    Diagnosis   • **Viral gastroenteritis   • Biliary dyskinesia   • Biliary sludge   • Dehydration   • Lactic acidosis   • Leukocytosis   • Chronic pain syndrome   • Tobacco abuse   • Solitary pulmonary nodule   • COPD (chronic obstructive pulmonary disease) (CMS/[facility])     Plan:  The patient was admitted on 7/11 by Dr. Butler.  She presented to the emergency department after having problems with vomiting and abdominal pain followed by a loose stool for several days duration.  She was felt to have viral gastroenteritis.  She was admitted secondary to volume depletion.  She has been unable to produce a stool sample since being admitted.  She remains persistently nauseous with severe midepigastric pain today.     Original CT from 7/11 failed to show any acute intra-abdominal or pelvic abnormality.  CT angiogram of the abdomen and pelvis on 7/13 showed a hyperdense gallbladder compatible with sludge or tiny stones.  Normal celiac artery and SMA.  No acute abnormality seen within the abdomen or pelvis.  I followed this with a gallbladder ultrasound that confirmed it to be filled with sludge, but no shadowing stones or bile duct dilation.  A HIDA scan today shows an EF of 25%.  I consulted general surgery to consider cholecystectomy.  Dr. Mclain did a laparoscopic cholecystectomy this morning.  Her gallbladder was found to be distended.     Continue pain control and anti-emetics.  Continue hydration; decrease.     IV Pepcid.     Offered a nicotine patch and counseled for  tobacco cessation.  Continue Symbicort.  She has a solitary pulmonary nodule on CT.  She will need this followed by her primary care physician in 3-6 months.     SCDs for DVT prophylaxis.     Discharge Planning: Home tomorrow.    Matias Espino,    07/15/20   15:30

## 2020-07-15 NOTE — ANESTHESIA PROCEDURE NOTES
Peripheral IV    Line placed for Fluids/Medication Admin.  Preanesthetic Checklist  Completed: patient identified, site marked, surgical consent, pre-op evaluation, timeout performed, IV checked, risks and benefits discussed and monitors and equipment checked  Peripheral IV Prep   Patient position: supine   Prep: alcohol swabs  Patient monitoring: cardiac monitor, heart rate and continuous pulse ox  Peripheral IV Procedure   Laterality:right  Location:  Antecubital  Catheter size: 18 G         Post Assessment   Dressing Type: tape and transparent.    IV Dressing/Site: clean, dry and intact

## 2020-07-15 NOTE — ANESTHESIA POSTPROCEDURE EVALUATION
"Patient: Tea Sams    Procedure Summary     Date:  07/15/20 Room / Location:  [facility] OR  /  [facility] OR    Anesthesia Start:  0855 Anesthesia Stop:  0955    Procedure:  CHOLECYSTECTOMY LAPAROSCOPIC (N/A Abdomen) Diagnosis:      Surgeon:  Tanna Mclain MD Provider:  Nidhi Vaughn CRNA    Anesthesia Type:  general ASA Status:  2          Anesthesia Type: general    Vitals  Vitals Value Taken Time   /67 7/15/2020 11:20 AM   Temp 98 °F (36.7 °C) 7/15/2020 11:20 AM   Pulse 71 7/15/2020 11:24 AM   Resp 16 7/15/2020 11:20 AM   SpO2 92 % 7/15/2020 11:22 AM   Vitals shown include unvalidated device data.        Post Anesthesia Care and Evaluation    Patient location during evaluation: PACU  Patient participation: complete - patient participated  Level of consciousness: awake and alert  Pain management: adequate  Airway patency: patent  Anesthetic complications: No anesthetic complications  PONV Status: none  Cardiovascular status: acceptable and hemodynamically stable  Respiratory status: acceptable  Hydration status: acceptable    Comments: Blood pressure 115/79, pulse 73, temperature 98 °F (36.7 °C), temperature source Oral, resp. rate 16, height 165.1 cm (65\"), weight 43.5 kg (96 lb), SpO2 92 %.    Patient discharged from PACU based upon Franchesca score. Please see RN notes for further details      "

## 2020-07-15 NOTE — OP NOTE
CHOLECYSTECTOMY LAPAROSCOPIC INTRAOPERATIVE CHOLANGIOGRAM  Procedure Note    Tea Latrell  7/15/2020    Pre-op Diagnosis:   * No pre-op diagnosis entered *    Post-op Diagnosis:     same    Procedure/CPT® Codes:  Laparoscopic cholecystectomy    Procedure(s):  CHOLECYSTECTOMY LAPAROSCOPIC    Surgeon(s):  Tanna Mclain MD    Anesthesia: General    Staff:   Circulator: Bradley Mott RN  Scrub Person: Gardenia Crook; Cathryn Sidhu    Estimated Blood Loss:minimal    Specimens:                Specimens     ID Source Type Tests Collected By Collected At Frozen?      A Gallbladder Tissue · TISSUE PATHOLOGY EXAM   Tanna Mclain MD 7/15/20 0925 No     Description: Gallbladder and Contents    This specimen was not marked as sent.            Drains: * No LDAs found *    Findings: Enlarged liver.  Gallbladder very distended.    Complications none          Date: 7/15/2020  Time: 09:40        The patient was brought to the operating room and placed in supine position. After induction of anesthesia and infusion of antibiotics, the patient was prepped and draped in the usual sterile fashion. Versed needle technique was used. Standard 4 ports were placed.  Upon entry there was some blood noted in the region of the liver.  There was a small punctate area of bleeding from the Veress needle.  This was controlled with cautery.  The gallbladder was grasped and retracted superiorly. The infundibulum was grasped and retracted laterally.  The liver was quite enlarged and the liver was distended which made surgery a little more difficult but ultimately able to get everything exposed the cystic duct and cystic artery were identified, isolated, divided between clips. The gallbladder was removed from its bed using electrocautery, placed in Endo bag and removed through the epigastric port. Irrigation was done until all clear. All ports were removed. Fascia at the 10 mm port site was closed with Vicryl. The skin was reapproximated  with #4-0 subcuticular. Then 0.5% Marcaine injected for local anesthesia. Dressing was placed. The patient was awakened and transferred to the recovery room in stable condition. He tolerated the procedure well. At the end of the procedure, all counts were correct.    Tanna Mclain MD

## 2020-07-16 VITALS
HEIGHT: 65 IN | RESPIRATION RATE: 18 BRPM | HEART RATE: 104 BPM | OXYGEN SATURATION: 98 % | SYSTOLIC BLOOD PRESSURE: 99 MMHG | TEMPERATURE: 98.9 F | DIASTOLIC BLOOD PRESSURE: 62 MMHG | BODY MASS INDEX: 15.99 KG/M2 | WEIGHT: 96 LBS

## 2020-07-16 LAB
ALBUMIN SERPL-MCNC: 4.4 G/DL (ref 3.5–5.2)
ALBUMIN/GLOB SERPL: 1.8 G/DL
ALP SERPL-CCNC: 88 U/L (ref 39–117)
ALT SERPL W P-5'-P-CCNC: 58 U/L (ref 1–33)
ANION GAP SERPL CALCULATED.3IONS-SCNC: 11 MMOL/L (ref 5–15)
AST SERPL-CCNC: 44 U/L (ref 1–32)
BACTERIA SPEC AEROBE CULT: NORMAL
BACTERIA SPEC AEROBE CULT: NORMAL
BILIRUB SERPL-MCNC: 0.6 MG/DL (ref 0–1.2)
BUN SERPL-MCNC: 9 MG/DL (ref 8–23)
BUN/CREAT SERPL: 13.6 (ref 7–25)
CALCIUM SPEC-SCNC: 9.5 MG/DL (ref 8.6–10.5)
CHLORIDE SERPL-SCNC: 98 MMOL/L (ref 98–107)
CO2 SERPL-SCNC: 27 MMOL/L (ref 22–29)
CREAT SERPL-MCNC: 0.66 MG/DL (ref 0.57–1)
DEPRECATED RDW RBC AUTO: 49.4 FL (ref 37–54)
ERYTHROCYTE [DISTWIDTH] IN BLOOD BY AUTOMATED COUNT: 15 % (ref 12.3–15.4)
GFR SERPL CREATININE-BSD FRML MDRD: 90 ML/MIN/1.73
GLOBULIN UR ELPH-MCNC: 2.5 GM/DL
GLUCOSE SERPL-MCNC: 127 MG/DL (ref 65–99)
HCT VFR BLD AUTO: 40.4 % (ref 34–46.6)
HGB BLD-MCNC: 13.9 G/DL (ref 12–15.9)
MCH RBC QN AUTO: 30.8 PG (ref 26.6–33)
MCHC RBC AUTO-ENTMCNC: 34.4 G/DL (ref 31.5–35.7)
MCV RBC AUTO: 89.6 FL (ref 79–97)
PLATELET # BLD AUTO: 531 10*3/MM3 (ref 140–450)
PMV BLD AUTO: 9.1 FL (ref 6–12)
POTASSIUM SERPL-SCNC: 4 MMOL/L (ref 3.5–5.2)
PROT SERPL-MCNC: 6.9 G/DL (ref 6–8.5)
RBC # BLD AUTO: 4.51 10*6/MM3 (ref 3.77–5.28)
SODIUM SERPL-SCNC: 136 MMOL/L (ref 136–145)
WBC # BLD AUTO: 23.43 10*3/MM3 (ref 3.4–10.8)

## 2020-07-16 PROCEDURE — 94799 UNLISTED PULMONARY SVC/PX: CPT

## 2020-07-16 PROCEDURE — 85027 COMPLETE CBC AUTOMATED: CPT | Performed by: INTERNAL MEDICINE

## 2020-07-16 PROCEDURE — 80053 COMPREHEN METABOLIC PANEL: CPT | Performed by: INTERNAL MEDICINE

## 2020-07-16 PROCEDURE — 25810000003 SODIUM CHLORIDE 0.9 % WITH KCL 20 MEQ 20-0.9 MEQ/L-% SOLUTION: Performed by: SPECIALIST

## 2020-07-16 RX ORDER — ONDANSETRON 4 MG/1
4 TABLET, ORALLY DISINTEGRATING ORAL EVERY 8 HOURS PRN
Qty: 15 TABLET | Refills: 1 | Status: SHIPPED | OUTPATIENT
Start: 2020-07-16 | End: 2021-03-24

## 2020-07-16 RX ADMIN — NICOTINE 1 PATCH: 14 PATCH, EXTENDED RELEASE TRANSDERMAL at 08:09

## 2020-07-16 RX ADMIN — POTASSIUM CHLORIDE AND SODIUM CHLORIDE 75 ML/HR: 900; 150 INJECTION, SOLUTION INTRAVENOUS at 07:10

## 2020-07-16 RX ADMIN — SODIUM CHLORIDE, PRESERVATIVE FREE 10 ML: 5 INJECTION INTRAVENOUS at 08:09

## 2020-07-16 RX ADMIN — FAMOTIDINE 20 MG: 10 INJECTION, SOLUTION INTRAVENOUS at 08:09

## 2020-07-16 RX ADMIN — HYDROCODONE BITARTRATE AND ACETAMINOPHEN 1 TABLET: 10; 325 TABLET ORAL at 06:55

## 2020-07-16 RX ADMIN — HYDROCODONE BITARTRATE AND ACETAMINOPHEN 1 TABLET: 10; 325 TABLET ORAL at 00:13

## 2020-07-16 RX ADMIN — BUDESONIDE AND FORMOTEROL FUMARATE DIHYDRATE 2 PUFF: 160; 4.5 AEROSOL RESPIRATORY (INHALATION) at 06:44

## 2020-07-16 RX ADMIN — HYDROCODONE BITARTRATE AND ACETAMINOPHEN 1 TABLET: 10; 325 TABLET ORAL at 10:27

## 2020-07-16 NOTE — DISCHARGE SUMMARY
[facility] Medicine Services  DISCHARGE SUMMARY       Date of Admission: 7/11/2020  Date of Discharge:  7/16/2020  Primary Care Physician: Jovi Arita Jr., MD    Presenting Problem/History of Present Illness:  Nausea, vomiting, diarrhea, abdominal pain.    Final Discharge Diagnoses:  Active Hospital Problems    Diagnosis   • **Viral gastroenteritis   • Biliary dyskinesia   • Biliary sludge   • Dehydration   • Lactic acidosis   • Leukocytosis   • Chronic pain syndrome   • Tobacco abuse   • Solitary pulmonary nodule   • COPD (chronic obstructive pulmonary disease) (CMS/Formerly [facility])     Consults: Dr. Mclain with general surgery.     Procedures Performed: Laparoscopic cholecystectomy by Dr. Mclain on 7/15.    Pertinent Test Results:   Imaging Results (Last 7 Days)     Procedure Component Value Units Date/Time    NM Hepatobiliary Without CCK [[id number]] Collected:  07/14/20 1417     Updated:  07/14/20 1420    Narrative:       EXAMINATION:   NM HEPATOBILIARY WITHOUT CCK-  7/14/2020 2:17 PM CDT     HISTORY: NM HEPATOBILIARY WITHOUT CCK- 7/14/2020 11:00 AM CDT     Clinical History: Nausea, vomiting, diarrhea; E86.0-Dehydration;  K52.9-Noninfective gastroenteritis and colitis, unspecified     Comparison: None.      Radiopharmaceutical: 5.5 mCi technetium 99m Mebrofenin IV.      Technique: Anterior images were acquired over the upper abdomen for a  period of 60 minutes following intravenous administration of the  radiopharmaceutical.      Subsequently, the patient ingested a 1 ounce mixture of corn oil  emulsion, and additional images of the abdomen were obtained for 60  minutes. Gallbladder ejection fraction was calculated.      Findings:    There is prompt progression of the isotope from the liver into the  intrahepatic biliary ducts, common bile duct and gallbladder.  Radiotracer is noted in the small bowel at the end of 60 minutes.      Following ingestion of the corn oil emulsion,  calculated gallbladder  ejection fraction is 25% (normal > 20%). There is further radiotracer  accumulation in the small bowel.        Impression:       Impression:   1. 25% biliary ejection fraction..     This report was finalized on 07/14/2020 14:17 by Dr. Obed Villavicencio MD.    US Gallbladder [[id number]] Collected:  07/13/20 1400     Updated:  07/13/20 1404    Narrative:       EXAMINATION: US GALLBLADDER-     7/13/2020 1:29 PM CDT     HISTORY: Abnormal CT and persistent pain, nausea; E86.0-Dehydration;  K52.9-Noninfective gastroenteritis and colitis, unspecified     Grayscale and color flow ultrasound evaluation of the right upper  quadrant.     Sludge-filled gallbladder.  No shadowing stones or gallbladder wall thickening.  No biliary dilation. CBD = 3-4 mm.     No focal liver abnormality is seen.     Summary:  1. Sludge-filled gallbladder with no shadowing stones or bile duct  dilation.  This report was finalized on 07/13/2020 14:01 by Dr. Jose Santiago MD.    CT Angiogram Abdomen Pelvis [[id number]] Collected:  07/13/20 1109     Updated:  07/13/20 1117    Narrative:       EXAMINATION: CT ANGIOGRAM ABDOMEN PELVIS-      7/13/2020 10:17 AM CDT     HISTORY: Persistent nausea and abdominal pain.; E86.0-Dehydration;  K52.9-Noninfective gastroenteritis and colitis, unspecified     In order to have a CT radiation dose as low as reasonably achievable  Automated Exposure Control was utilized for adjustment of the mA and/or  KV according to patient size.     DLP in mGycm= 399.     CT angiography protocol.   CT imaging with bolus IV contrast injection.   Under concurrent supervision axial, sagittal, coronal, and  three-dimensional data sets were constructed.     Noncontrast:  Hyperdense gallbladder compatible with sludge or tiny stones.  3 mm calcification within the mid right kidney.  Arterial calcification.  Pelvic phleboliths.     Postcontrast:  No aortic aneurysm or dissection.  Normally patent iliac and common femoral  arteries.     Upper aorta = 23 x 24 mm.  Mid aorta = 19 x 19 mm.  Distal aorta = 19 x 17 mm.     Normally patent celiac artery, superior mesenteric artery, and renal  arteries.  Diminutive though patent inferior mesenteric artery.     No bowel dilation.  No mesenteric fluid or inflammation is seen.     Normal appearance of the liver and spleen.  Mildly atrophic pancreas noted.  Normal and symmetric kidneys.     Summary:  1. Hyperdense gallbladder compatible with sludge or tiny stones.  2. No aortic aneurysm or dissection.  Normal celiac artery and SMA.  3. No acute abnormality is seen within the abdomen or pelvis.                                   This report was finalized on 07/13/2020 11:14 by Dr. Jose Santiago MD.    CT Abdomen Pelvis With Contrast [[id number]] Collected:  07/11/20 1451     Updated:  07/11/20 1502    Narrative:       EXAMINATION:   CT ABDOMEN PELVIS W CONTRAST-  7/11/2020 2:51 PM CDT     HISTORY: CT ABDOMEN AND PELVIS WITH CONTRAST 7/11/2020 2:19 PM CDT     HISTORY: Severe epigastric pain1     COMPARISON: None.      DLP: 105 mGy cm     TECHNIQUE: Following the intravenous administration of contrast, helical  CT tomographic images of the abdomen and pelvis were acquired. Coronal  reformatted images were also provided for review.      FINDINGS:   Motion is noted limiting evaluation of the lung bases there is  suggestion of a 6 mm nodule in the right lung base..      LIVER: No focal liver lesion. The hepatic vasculature is patent.      BILIARY SYSTEM: The gallbladder is unremarkable. No intrahepatic or  extrahepatic ductal dilatation.      PANCREAS: No focal pancreatic lesion.      SPLEEN: Unremarkable.      KIDNEYS AND ADRENALS: Bilateral kidneys and adrenal glands are  unremarkable. The ureters are decompressed and normal in appearance.     RETROPERITONEUM: No mass, lymphadenopathy or hemorrhage.      GI TRACT: No evidence of obstruction or bowel wall thickening. The  appendix is visualized and  unremarkable.     OTHER: There is no mesenteric mass, lymphadenopathy or fluid collection.  The abdominopelvic vasculature is patent. The osseous structures and  soft tissues demonstrate no worrisome lesions. Old fracture of the right  greater trochanter is noted.      PELVIS: The uterus is visualized.. The urinary bladder is normal in  appearance.       Impression:       1. No acute intra-abdominal or pelvic abnormality.  2. Old fracture right greater trochanter.  3. 6 mm nodule right lower lobe follow-up with CT in 3-6 months to  confirm persistence is suggested according to Fleischner Society  recommendations     Fleischner Society Recommendations for Management of PARTLY SOLID  Pulmonary nodules:     SOLITARY NODULES:  1. For partly solid nodules measuring less than 6 mm in diameter, no  follow-up is needed.  2. For partly solid nodules measuring greater than 6 mm, CT in 3 to 6  months to confirm persistence. If unchanged and a solid component is  below 6 mm, CT annually for 5 years. (Persistent, partly solid nodules  containing a solid component greater than 6 mm are highly suspicious).     MULTIPLE NODULES:  1. For multiple, partly solid nodules measuring less than 6 mm in  diameter, CT in 3 to 6 months. If unchanged, consider CT at 2 and 4  years.  2. For multiple, partly solid nodules measuring greater than 6 mm in  diameter, CT in 3 to 6 months. Then management is based on the most  suspicious nodule..         This report was finalized on 07/11/2020 14:59 by Dr. Obed Villavicencio MD.        Lab Results (last 7 days)     Procedure Component Value Units Date/Time    Comprehensive Metabolic Panel [[id number]]  (Abnormal) Collected:  07/16/20 0544    Specimen:  Blood Updated:  07/16/20 0628     Glucose 127 mg/dL      BUN 9 mg/dL      Creatinine 0.66 mg/dL      Sodium 136 mmol/L      Potassium 4.0 mmol/L      Chloride 98 mmol/L      CO2 27.0 mmol/L      Calcium 9.5 mg/dL      Total Protein 6.9 g/dL      Albumin 4.40  g/dL      ALT (SGPT) 58 U/L      AST (SGOT) 44 U/L      Alkaline Phosphatase 88 U/L      Total Bilirubin 0.6 mg/dL      eGFR Non African Amer 90 mL/min/1.73      Globulin 2.5 gm/dL      A/G Ratio 1.8 g/dL      BUN/Creatinine Ratio 13.6     Anion Gap 11.0 mmol/L     Narrative:       GFR Normal >60  Chronic Kidney Disease <60  Kidney Failure <15      CBC (No Diff) [[id number]]  (Abnormal) Collected:  07/16/20 0544    Specimen:  Blood Updated:  07/16/20 0611     WBC 23.43 10*3/mm3      RBC 4.51 10*6/mm3      Hemoglobin 13.9 g/dL      Hematocrit 40.4 %      MCV 89.6 fL      MCH 30.8 pg      MCHC 34.4 g/dL      RDW 15.0 %      RDW-SD 49.4 fl      MPV 9.1 fL      Platelets 531 10*3/mm3     Blood Culture - Blood, Arm, Left [[id number]] Collected:  07/11/20 1315    Specimen:  Blood from Arm, Left Updated:  07/15/20 1415     Blood Culture No growth at 4 days    Blood Culture - Blood, Arm, Right [[id number]] Collected:  07/11/20 1137    Specimen:  Blood from Arm, Right Updated:  07/15/20 1200     Blood Culture No growth at 4 days    Tissue Pathology Exam [[id number]] Collected:  07/15/20 0925    Specimen:  Tissue from Gallbladder Updated:  07/15/20 1158    CBC Auto Differential [[id number]]  (Abnormal) Collected:  07/15/20 0445    Specimen:  Blood Updated:  07/15/20 0514     WBC 9.43 10*3/mm3      RBC 4.11 10*6/mm3      Hemoglobin 12.4 g/dL      Hematocrit 37.6 %      MCV 91.5 fL      MCH 30.2 pg      MCHC 33.0 g/dL      RDW 15.0 %      RDW-SD 50.0 fl      MPV 9.0 fL      Platelets 477 10*3/mm3      Neutrophil % 48.0 %      Lymphocyte % 38.4 %      Monocyte % 9.3 %      Eosinophil % 2.8 %      Basophil % 1.2 %      Immature Grans % 0.3 %      Neutrophils, Absolute 4.53 10*3/mm3      Lymphocytes, Absolute 3.62 10*3/mm3      Monocytes, Absolute 0.88 10*3/mm3      Eosinophils, Absolute 0.26 10*3/mm3      Basophils, Absolute 0.11 10*3/mm3      Immature Grans, Absolute 0.03 10*3/mm3      nRBC 0.0 /100 WBC     CBC Auto Differential  [[id number]]  (Abnormal) Collected:  07/14/20 0421    Specimen:  Blood Updated:  07/14/20 0511     WBC 11.03 10*3/mm3      RBC 4.36 10*6/mm3      Hemoglobin 13.1 g/dL      Hematocrit 38.7 %      MCV 88.8 fL      MCH 30.0 pg      MCHC 33.9 g/dL      RDW 14.7 %      RDW-SD 47.7 fl      MPV 8.8 fL      Platelets 487 10*3/mm3     Manual Differential [[id number]]  (Abnormal) Collected:  07/14/20 0421    Specimen:  Blood Updated:  07/14/20 0511     Neutrophil % 48.5 %      Lymphocyte % 37.4 %      Monocyte % 5.1 %      Eosinophil % 1.0 %      Basophil % 2.0 %      Bands %  1.0 %      Atypical Lymphocyte % 5.1 %      Neutrophils Absolute 5.46 10*3/mm3      Lymphocytes Absolute 4.13 10*3/mm3      Monocytes Absolute 0.56 10*3/mm3      Eosinophils Absolute 0.11 10*3/mm3      Basophils Absolute 0.22 10*3/mm3      Polychromasia Slight/1+     WBC Morphology Normal     Platelet Morphology Normal    Comprehensive Metabolic Panel [[id number]]  (Abnormal) Collected:  07/14/20 0421    Specimen:  Blood Updated:  07/14/20 0507     Glucose 108 mg/dL      BUN 8 mg/dL      Creatinine 0.56 mg/dL      Sodium 136 mmol/L      Potassium 4.0 mmol/L      Chloride 101 mmol/L      CO2 26.0 mmol/L      Calcium 8.8 mg/dL      Total Protein 6.2 g/dL      Albumin 3.90 g/dL      ALT (SGPT) 18 U/L      AST (SGOT) 17 U/L      Alkaline Phosphatase 75 U/L      Total Bilirubin 0.5 mg/dL      eGFR Non African Amer 109 mL/min/1.73      Globulin 2.3 gm/dL      A/G Ratio 1.7 g/dL      BUN/Creatinine Ratio 14.3     Anion Gap 9.0 mmol/L     Narrative:       GFR Normal >60  Chronic Kidney Disease <60  Kidney Failure <15      Lipase [[id number]]  (Normal) Collected:  07/14/20 0421    Specimen:  Blood Updated:  07/14/20 0507     Lipase 18 U/L     Basic Metabolic Panel [[id number]]  (Abnormal) Collected:  07/13/20 0534    Specimen:  Blood Updated:  07/13/20 0715     Glucose 143 mg/dL      BUN 8 mg/dL      Creatinine 0.53 mg/dL      Sodium 134 mmol/L      Potassium 3.7  mmol/L      Chloride 98 mmol/L      CO2 24.0 mmol/L      Calcium 8.6 mg/dL      eGFR Non African Amer 116 mL/min/1.73      BUN/Creatinine Ratio 15.1     Anion Gap 12.0 mmol/L     Narrative:       GFR Normal >60  Chronic Kidney Disease <60  Kidney Failure <15      CBC Auto Differential [[id number]]  (Abnormal) Collected:  07/13/20 0534    Specimen:  Blood Updated:  07/13/20 0643     WBC 14.03 10*3/mm3      RBC 4.72 10*6/mm3      Hemoglobin 14.1 g/dL      Hematocrit 42.1 %      MCV 89.2 fL      MCH 29.9 pg      MCHC 33.5 g/dL      RDW 14.8 %      RDW-SD 48.3 fl      MPV 9.2 fL      Platelets 564 10*3/mm3      Neutrophil % 68.3 %      Lymphocyte % 20.7 %      Monocyte % 9.7 %      Eosinophil % 0.3 %      Basophil % 0.6 %      Immature Grans % 0.4 %      Neutrophils, Absolute 9.57 10*3/mm3      Lymphocytes, Absolute 2.91 10*3/mm3      Monocytes, Absolute 1.36 10*3/mm3      Eosinophils, Absolute 0.04 10*3/mm3      Basophils, Absolute 0.09 10*3/mm3      Immature Grans, Absolute 0.06 10*3/mm3      nRBC 0.0 /100 WBC     CBC Auto Differential [[id number]]  (Abnormal) Collected:  07/12/20 0609    Specimen:  Blood Updated:  07/12/20 0712     WBC 17.23 10*3/mm3      RBC 4.47 10*6/mm3      Hemoglobin 13.3 g/dL      Hematocrit 39.9 %      MCV 89.3 fL      MCH 29.8 pg      MCHC 33.3 g/dL      RDW 15.2 %      RDW-SD 49.8 fl      MPV 9.3 fL      Platelets 573 10*3/mm3      Neutrophil % 73.0 %      Lymphocyte % 16.3 %      Monocyte % 9.5 %      Eosinophil % 0.2 %      Basophil % 0.5 %      Immature Grans % 0.5 %      Neutrophils, Absolute 12.59 10*3/mm3      Lymphocytes, Absolute 2.80 10*3/mm3      Monocytes, Absolute 1.64 10*3/mm3      Eosinophils, Absolute 0.04 10*3/mm3      Basophils, Absolute 0.08 10*3/mm3      Immature Grans, Absolute 0.08 10*3/mm3      nRBC 0.0 /100 WBC     Comprehensive Metabolic Panel [[id number]]  (Abnormal) Collected:  07/12/20 0609    Specimen:  Blood Updated:  07/12/20 0711     Glucose 134 mg/dL      BUN 11  mg/dL      Creatinine 0.58 mg/dL      Sodium 134 mmol/L      Potassium 3.7 mmol/L      Chloride 101 mmol/L      CO2 22.0 mmol/L      Calcium 8.4 mg/dL      Total Protein 6.7 g/dL      Albumin 4.00 g/dL      ALT (SGPT) 18 U/L      AST (SGOT) 14 U/L      Alkaline Phosphatase 93 U/L      Total Bilirubin 0.6 mg/dL      eGFR Non African Amer 105 mL/min/1.73      Globulin 2.7 gm/dL      A/G Ratio 1.5 g/dL      BUN/Creatinine Ratio 19.0     Anion Gap 11.0 mmol/L     Narrative:       GFR Normal >60  Chronic Kidney Disease <60  Kidney Failure <15      Magnesium [[id number]]  (Normal) Collected:  07/12/20 0609    Specimen:  Blood Updated:  07/12/20 0711     Magnesium 1.8 mg/dL     Urine Drug Screen - Urine, Clean Catch [[id number]]  (Abnormal) Collected:  07/11/20 1630    Specimen:  Urine, Clean Catch Updated:  07/11/20 1818     THC, Screen, Urine Positive     Phencyclidine (PCP), Urine Negative     Cocaine Screen, Urine Negative     Methamphetamine, Ur Negative     Opiate Screen Positive     Amphetamine Screen, Urine Negative     Benzodiazepine Screen, Urine Negative     Tricyclic Antidepressants Screen Negative     Methadone Screen, Urine Negative     Barbiturates Screen, Urine Negative     Oxycodone Screen, Urine Negative     Propoxyphene Screen Negative     Buprenorphine, Screen, Urine Negative    Narrative:       Cutoff For Drugs Screened:    Amphetamines               500 ng/ml  Barbiturates               200 ng/ml  Benzodiazepines            150 ng/ml  Cocaine                    150 ng/ml  Methadone                  200 ng/ml  Opiates                    100 ng/ml  Phencyclidine               25 ng/ml  THC                            50 ng/ml  Methamphetamine            500 ng/ml  Tricyclic Antidepressants  300 ng/ml  Oxycodone                  100 ng/ml  Propoxyphene               300 ng/ml  Buprenorphine               10 ng/ml    The normal value for all drugs tested is negative. This report includes unconfirmed  screening results, with the cutoff values listed, to be used for medical treatment purposes only.  Unconfirmed results must not be used for non-medical purposes such as employment or legal testing.  Clinical consideration should be applied to any drug of abuse test, particularly when unconfirmed results are used.      Urinalysis With Culture If Indicated - Urine, Clean Catch [[id number]]  (Abnormal) Collected:  07/11/20 1630    Specimen:  Urine, Clean Catch Updated:  07/11/20 1652     Color, UA Yellow     Appearance, UA Clear     pH, UA 7.0     Specific Gravity, UA >1.030     Glucose, UA Negative     Ketones, UA Negative     Bilirubin, UA Negative     Blood, UA Moderate (2+)     Protein,  mg/dL (2+)     Leuk Esterase, UA Negative     Nitrite, UA Negative     Urobilinogen, UA 0.2 E.U./dL    Urinalysis, Microscopic Only - Urine, Clean Catch [[id number]]  (Abnormal) Collected:  07/11/20 1630    Specimen:  Urine, Clean Catch Updated:  07/11/20 1652     RBC, UA 31-50 /HPF      WBC, UA 0-2 /HPF      Bacteria, UA None Seen /HPF      Squamous Epithelial Cells, UA None Seen /HPF      Hyaline Casts, UA None Seen /LPF      Methodology Automated Microscopy    COVID PRE-OP / PRE-PROCEDURE SCREENING ORDER (NO ISOLATION) - Swab, Nasopharynx [[id number]] Collected:  07/11/20 1602    Specimen:  Swab from Nasopharynx Updated:  07/11/20 1648    Narrative:       The following orders were created for panel order COVID PRE-OP / PRE-PROCEDURE SCREENING ORDER (NO ISOLATION) - Swab, Nasopharynx.  Procedure                               Abnormality         Status                     ---------                               -----------         ------                     COVID-19, ABBOTT IN-HOUS...[[id number]]  Normal              Final result                 Please view results for these tests on the individual orders.    COVID-19, ABBOTT IN-HOUSE,NP Swab (NO TRANSPORT MEDIA) 2 HR TAT - Swab, Nasopharynx [[id number]]  (Normal) Collected:   07/11/20 1602    Specimen:  Swab from Nasopharynx Updated:  07/11/20 1648     COVID19 Not Detected    Narrative:       Fact sheet for providers: https://www.fda.gov/media/377144/download     Fact sheet for patients: https://www.fda.gov/media/004184/download    Troponin [[id number]]  (Normal) Collected:  07/11/20 1615    Specimen:  Blood Updated:  07/11/20 1637     Troponin T 0.022 ng/mL     Narrative:       Troponin T Reference Range:  <= 0.03 ng/mL-   Negative for AMI  >0.03 ng/mL-     Abnormal for myocardial necrosis.  Clinicians would have to utilize clinical acumen, EKG, Troponin and serial changes to determine if it is an Acute Myocardial Infarction or myocardial injury due to an underlying chronic condition.       Results may be falsely decreased if patient taking Biotin.      Troponin [[id number]]  (Normal) Collected:  07/11/20 1137    Specimen:  Blood Updated:  07/11/20 1624     Troponin T 0.022 ng/mL     Narrative:       Troponin T Reference Range:  <= 0.03 ng/mL-   Negative for AMI  >0.03 ng/mL-     Abnormal for myocardial necrosis.  Clinicians would have to utilize clinical acumen, EKG, Troponin and serial changes to determine if it is an Acute Myocardial Infarction or myocardial injury due to an underlying chronic condition.       Results may be falsely decreased if patient taking Biotin.      Lactic Acid, Reflex [[id number]]  (Normal) Collected:  07/11/20 1537    Specimen:  Blood Updated:  07/11/20 1608     Lactate 1.6 mmol/L     Lactic Acid, Reflex Timer (This will reflex a repeat order 3-3:15 hours after ordered.) [[id number]] Collected:  07/11/20 1137    Specimen:  Blood Updated:  07/11/20 1515     Hold Tube Hold for add-ons.     Comment: Auto resulted.       Lipase [[id number]]  (Normal) Collected:  07/11/20 1137    Specimen:  Blood Updated:  07/11/20 1313     Lipase 15 U/L     Lactic Acid, Plasma [[id number]]  (Abnormal) Collected:  07/11/20 1137    Specimen:  Blood Updated:  07/11/20 1211     Lactate  3.0 mmol/L     Comprehensive Metabolic Panel [[id number]]  (Abnormal) Collected:  07/11/20 1137    Specimen:  Blood Updated:  07/11/20 1209     Glucose 178 mg/dL      BUN 21 mg/dL      Creatinine 0.65 mg/dL      Sodium 134 mmol/L      Potassium 3.6 mmol/L      Chloride 94 mmol/L      CO2 23.0 mmol/L      Calcium 10.3 mg/dL      Total Protein 8.4 g/dL      Albumin 4.90 g/dL      ALT (SGPT) 30 U/L      AST (SGOT) 22 U/L      Alkaline Phosphatase 132 U/L      Total Bilirubin 0.6 mg/dL      eGFR Non African Amer 92 mL/min/1.73      Globulin 3.5 gm/dL      A/G Ratio 1.4 g/dL      BUN/Creatinine Ratio 32.3     Anion Gap 17.0 mmol/L     Narrative:       GFR Normal >60  Chronic Kidney Disease <60  Kidney Failure <15      CBC Auto Differential [[id number]]  (Abnormal) Collected:  07/11/20 1137    Specimen:  Blood Updated:  07/11/20 1154     WBC 22.10 10*3/mm3      RBC 5.19 10*6/mm3      Hemoglobin 15.9 g/dL      Hematocrit 45.9 %      MCV 88.4 fL      MCH 30.6 pg      MCHC 34.6 g/dL      RDW 15.6 %      RDW-SD 50.2 fl      MPV 8.9 fL      Platelets 722 10*3/mm3      Neutrophil % 82.4 %      Lymphocyte % 10.3 %      Monocyte % 6.1 %      Eosinophil % 0.1 %      Basophil % 0.5 %      Immature Grans % 0.6 %      Neutrophils, Absolute 18.23 10*3/mm3      Lymphocytes, Absolute 2.27 10*3/mm3      Monocytes, Absolute 1.34 10*3/mm3      Eosinophils, Absolute 0.02 10*3/mm3      Basophils, Absolute 0.10 10*3/mm3      Immature Grans, Absolute 0.14 10*3/mm3      nRBC 0.0 /100 WBC         Hospital Course:  The patient was admitted on 7/11 by Dr. Butler.  She presented to the emergency department after having problems with vomiting and abdominal pain followed by a loose stool for several days duration.  She was felt to have viral gastroenteritis.  She was admitted secondary to volume depletion.  She has been unable to produce a stool sample since being admitted.       Original CT from 7/11 failed to show any acute intra-abdominal or  "pelvic abnormality.  CT angiogram of the abdomen and pelvis on 7/13 showed a hyperdense gallbladder compatible with sludge or tiny stones.  Normal celiac artery and SMA.  No acute abnormality seen within the abdomen or pelvis.  I followed this with a gallbladder ultrasound that confirmed it to be filled with sludge, but no shadowing stones or bile duct dilation.  A HIDA scan today shows an EF of 25%.  I consulted general surgery to consider cholecystectomy.  Dr. Mclain did a laparoscopic cholecystectomy this morning.  Her gallbladder was found to be distended. She has some mildly elevated transaminases postsurgically.  She has had a recurrence of leukocytosis postsurgically, but I feel that this is reactive.  She continues to not run any fever and has no other focal symptoms of infection.     IV Pepcid was used.     Offered a nicotine patch and counseled for tobacco cessation.  Continue Symbicort.  She has a solitary pulmonary nodule on CT.  She will need this followed by her primary care physician in 3-6 months.     SCDs were used for DVT prophylaxis.    She has been cleared for discharge by general surgery.  She feels much better.  She tolerated diet this morning without difficulty.  Pain medication prescription written by general surgery.  I will also prescribe her some nausea medication.  She needs to follow-up with her primary care doctor in 1 week.  She needs to see general surgery per their instructions.    Physical Exam on Discharge:  BP 99/62 (BP Location: Right arm, Patient Position: Sitting)   Pulse 104   Temp 98.9 °F (37.2 °C) (Oral)   Resp 18   Ht 165.1 cm (65\")   Wt 43.5 kg (96 lb)   SpO2 98%   BMI 15.98 kg/m²   Physical Exam  Constitutional: She is oriented to person, place, and time.   Up and ambulatory in the room. No family present. Discussed with her nurse, Brii.   Head: Normocephalic and atraumatic.   Eyes: Pupils are equal, round, and reactive to light. Conjunctivae and EOM are normal.  "   Neck: Neck supple. No JVD present.   Cardiovascular: Normal rate and regular rhythm.   Pulmonary/Chest: Effort normal. No respiratory distress.   Abdominal: Soft. Bowel sounds are normal. She exhibits no distension. There is mild tenderness around the laparoscopic sites, which are well dressed and clean. There is no rebound and no guarding.   Musculoskeletal: Normal range of motion. She exhibits no edema, tenderness or deformity.   Neurological: She is alert and oriented to person, place, and time. She displays normal reflexes. No cranial nerve deficit. She exhibits normal muscle tone.  Skin: Skin is warm and dry. No rash noted.   Psychiatric:  Brighter affect today.  States that she feels better and wants to go home.     Condition on Discharge: Good.     Discharge Disposition:  Home or Self Care    Discharge Medications:     Discharge Medications      New Medications      Instructions Start Date   HYDROcodone-acetaminophen 7.5-325 MG per tablet  Commonly known as:  NORCO   1 tablet, Oral, Every 4 Hours PRN      ondansetron ODT 4 MG disintegrating tablet  Commonly known as:  Zofran ODT   4 mg, Translingual, Every 8 Hours PRN         Continue These Medications      Instructions Start Date   albuterol sulfate  (90 Base) MCG/ACT inhaler  Commonly known as:  PROVENTIL HFA;VENTOLIN HFA;PROAIR HFA   2 puffs, Inhalation, Every 4 Hours PRN      budesonide-formoterol 160-4.5 MCG/ACT inhaler  Commonly known as:  SYMBICORT   2 puffs, Inhalation, 2 Times Daily - RT      buprenorphine-naloxone 8-2 MG per SL tablet  Commonly known as:  SUBOXONE   1.5 tablets, Sublingual, Daily           Discharge Diet:   Diet Instructions     Diet: Regular; Thin      Discharge Diet:  Regular    Fluid Consistency:  Thin        Activity at Discharge:   Activity Instructions     Activity as Tolerated      Other Activity Instructions      Other Activity Instructions per Dr. Mclain        Follow-up Appointments:   1. PCP in 1 week.   2.   Chemo per him.     Test Results Pending at Discharge: [facility].   Matias Espino DO  07/16/20  09:44    Time: 35 minutes.

## 2020-07-16 NOTE — PLAN OF CARE
Problem: Patient Care Overview  Goal: Plan of Care Review  Outcome: Ongoing (interventions implemented as appropriate)  Flowsheets (Taken 7/16/2020 6731)  Progress: no change  Plan of Care Reviewed With: patient  Outcome Summary:     Pt medicated for pain x1 thus far this shift for c/o incisional abd pain. Zofran 8mg also given x1 for c/o nausea. Lap x4 wiht g/t, sm amount of drainage noted to 1 lap site, others c/d/i. IVF infusing per orders. Up ad kosta. Voiding per [facility]. Resting between care. VSS. Will cont to monitor.

## 2020-07-16 NOTE — PROGRESS NOTES
Had significant pain and vomiting after eating yesterday after surgery suggested overnight.  Feeling much better this morning.  Okay for discharge today.

## 2020-07-16 NOTE — PAYOR COMM NOTE
"Nickolas Gil (64 y.o. Female) [id number]   D/C NOTIFICATION     [facility]     pito phone    Fax        Date of Birth Social Security Number Address [facility] Phone MRN    1955  [id number] [facility]  [address] [place] [address] [phone] [id number]    Spiritism Marital Status          Non-Evangelical Single       Admission Date Admission Type Admitting Provider Attending Provider Department, Room/Bed    7/11/20 Emergency Matias Espino DO  [facility] 3C, 379/1    Discharge Date Discharge Disposition Discharge Destination        7/16/2020 Home or Self Care              Attending Provider:  (none)   Allergies:  No Known Allergies    Isolation:  None   Infection:  None   Code Status:  CPR    Ht:  165.1 cm (65\")   Wt:  43.5 kg (96 lb)    Admission Cmt:  None   Principal Problem:  Viral gastroenteritis [A08.4]                 Active Insurance as of 7/11/2020     Primary Coverage     Payor Plan Insurance Group Employer/Plan Group    [employer] [place] [employer] [place]      Payor Plan Address Payor Plan Phone Number Payor Plan Fax Number Effective Dates    [address] [address] [phone]  3/21/2019 - None Entered    [facility] [id number]       Subscriber Name Subscriber Birth Date Member ID       NICKOLAS GIL 1955 [id number]                 Emergency Contacts      (Rel.) Home Phone Work Phone Mobile Phone    Eva Hodge (Son) [phone] -- --    Marisol Lucas (Friend) -- -- [phone]    Oumar Linder (Friend) [phone] -- --               Discharge Summary      Matias Espino DO at 07/16/20 0944                [facility]  DISCHARGE SUMMARY       Date of Admission: 7/11/2020  Date of Discharge:  7/16/2020  Primary Care Physician: Jovi Arita Jr., MD    Presenting Problem/History of Present Illness:  Nausea, vomiting, diarrhea, abdominal pain.    Final Discharge " Diagnoses:  Active Hospital Problems    Diagnosis   • **Viral gastroenteritis   • Biliary dyskinesia   • Biliary sludge   • Dehydration   • Lactic acidosis   • Leukocytosis   • Chronic pain syndrome   • Tobacco abuse   • Solitary pulmonary nodule   • COPD (chronic obstructive pulmonary disease) (CMS/HCC)     Consults: Dr. Mclain with general surgery.     Procedures Performed: Laparoscopic cholecystectomy by Dr. Mclain on 7/15.    Pertinent Test Results:   Imaging Results (Last 7 Days)     Procedure Component Value Units Date/Time    NM Hepatobiliary Without CCK [[id number]] Collected:  07/14/20 1417     Updated:  07/14/20 1420    Narrative:       EXAMINATION:   NM HEPATOBILIARY WITHOUT CCK-  7/14/2020 2:17 PM CDT     HISTORY: NM HEPATOBILIARY WITHOUT CCK- 7/14/2020 11:00 AM CDT     Clinical History: Nausea, vomiting, diarrhea; E86.0-Dehydration;  K52.9-Noninfective gastroenteritis and colitis, unspecified     Comparison: None.      Radiopharmaceutical: 5.5 mCi technetium 99m Mebrofenin IV.      Technique: Anterior images were acquired over the upper abdomen for a  period of 60 minutes following intravenous administration of the  radiopharmaceutical.      Subsequently, the patient ingested a 1 ounce mixture of corn oil  emulsion, and additional images of the abdomen were obtained for 60  minutes. Gallbladder ejection fraction was calculated.      Findings:    There is prompt progression of the isotope from the liver into the  intrahepatic biliary ducts, common bile duct and gallbladder.  Radiotracer is noted in the small bowel at the end of 60 minutes.      Following ingestion of the corn oil emulsion, calculated gallbladder  ejection fraction is 25% (normal > 20%). There is further radiotracer  accumulation in the small bowel.        Impression:       Impression:   1. 25% biliary ejection fraction..     This report was finalized on 07/14/2020 14:17 by Dr. Obed Villavicencio MD.     Gallbladder [[id number]] Collected:   07/13/20 1400     Updated:  07/13/20 1404    Narrative:       EXAMINATION: US GALLBLADDER-     7/13/2020 1:29 PM CDT     HISTORY: Abnormal CT and persistent pain, nausea; E86.0-Dehydration;  K52.9-Noninfective gastroenteritis and colitis, unspecified     Grayscale and color flow ultrasound evaluation of the right upper  quadrant.     Sludge-filled gallbladder.  No shadowing stones or gallbladder wall thickening.  No biliary dilation. CBD = 3-4 mm.     No focal liver abnormality is seen.     Summary:  1. Sludge-filled gallbladder with no shadowing stones or bile duct  dilation.  This report was finalized on 07/13/2020 14:01 by Dr. Jose Santiago MD.    CT Angiogram Abdomen Pelvis [[id number]] Collected:  07/13/20 1109     Updated:  07/13/20 1117    Narrative:       EXAMINATION: CT ANGIOGRAM ABDOMEN PELVIS-      7/13/2020 10:17 AM CDT     HISTORY: Persistent nausea and abdominal pain.; E86.0-Dehydration;  K52.9-Noninfective gastroenteritis and colitis, unspecified     In order to have a CT radiation dose as low as reasonably achievable  Automated Exposure Control was utilized for adjustment of the mA and/or  KV according to patient size.     DLP in mGycm= 399.     CT angiography protocol.   CT imaging with bolus IV contrast injection.   Under concurrent supervision axial, sagittal, coronal, and  three-dimensional data sets were constructed.     Noncontrast:  Hyperdense gallbladder compatible with sludge or tiny stones.  3 mm calcification within the mid right kidney.  Arterial calcification.  Pelvic phleboliths.     Postcontrast:  No aortic aneurysm or dissection.  Normally patent iliac and common femoral arteries.     Upper aorta = 23 x 24 mm.  Mid aorta = 19 x 19 mm.  Distal aorta = 19 x 17 mm.     Normally patent celiac artery, superior mesenteric artery, and renal  arteries.  Diminutive though patent inferior mesenteric artery.     No bowel dilation.  No mesenteric fluid or inflammation is seen.     Normal  appearance of the liver and spleen.  Mildly atrophic pancreas noted.  Normal and symmetric kidneys.     Summary:  1. Hyperdense gallbladder compatible with sludge or tiny stones.  2. No aortic aneurysm or dissection.  Normal celiac artery and SMA.  3. No acute abnormality is seen within the abdomen or pelvis.                                   This report was finalized on 07/13/2020 11:14 by Dr. Jose Santiago MD.    CT Abdomen Pelvis With Contrast [[id number]] Collected:  07/11/20 1451     Updated:  07/11/20 1502    Narrative:       EXAMINATION:   CT ABDOMEN PELVIS W CONTRAST-  7/11/2020 2:51 PM CDT     HISTORY: CT ABDOMEN AND PELVIS WITH CONTRAST 7/11/2020 2:19 PM CDT     HISTORY: Severe epigastric pain1     COMPARISON: None.      DLP: 105 mGy cm     TECHNIQUE: Following the intravenous administration of contrast, helical  CT tomographic images of the abdomen and pelvis were acquired. Coronal  reformatted images were also provided for review.      FINDINGS:   Motion is noted limiting evaluation of the lung bases there is  suggestion of a 6 mm nodule in the right lung base..      LIVER: No focal liver lesion. The hepatic vasculature is patent.      BILIARY SYSTEM: The gallbladder is unremarkable. No intrahepatic or  extrahepatic ductal dilatation.      PANCREAS: No focal pancreatic lesion.      SPLEEN: Unremarkable.      KIDNEYS AND ADRENALS: Bilateral kidneys and adrenal glands are  unremarkable. The ureters are decompressed and normal in appearance.     RETROPERITONEUM: No mass, lymphadenopathy or hemorrhage.      GI TRACT: No evidence of obstruction or bowel wall thickening. The  appendix is visualized and unremarkable.     OTHER: There is no mesenteric mass, lymphadenopathy or fluid collection.  The abdominopelvic vasculature is patent. The osseous structures and  soft tissues demonstrate no worrisome lesions. Old fracture of the right  greater trochanter is noted.      PELVIS: The uterus is visualized.. The  urinary bladder is normal in  appearance.       Impression:       1. No acute intra-abdominal or pelvic abnormality.  2. Old fracture right greater trochanter.  3. 6 mm nodule right lower lobe follow-up with CT in 3-6 months to  confirm persistence is suggested according to Fleischner Society  recommendations     Fleischner Society Recommendations for Management of PARTLY SOLID  Pulmonary nodules:     SOLITARY NODULES:  1. For partly solid nodules measuring less than 6 mm in diameter, no  follow-up is needed.  2. For partly solid nodules measuring greater than 6 mm, CT in 3 to 6  months to confirm persistence. If unchanged and a solid component is  below 6 mm, CT annually for 5 years. (Persistent, partly solid nodules  containing a solid component greater than 6 mm are highly suspicious).     MULTIPLE NODULES:  1. For multiple, partly solid nodules measuring less than 6 mm in  diameter, CT in 3 to 6 months. If unchanged, consider CT at 2 and 4  years.  2. For multiple, partly solid nodules measuring greater than 6 mm in  diameter, CT in 3 to 6 months. Then management is based on the most  suspicious nodule..         This report was finalized on 07/11/2020 14:59 by Dr. Obed Villavicencio MD.        Lab Results (last 7 days)     Procedure Component Value Units Date/Time    Comprehensive Metabolic Panel [[id number]]  (Abnormal) Collected:  07/16/20 0544    Specimen:  Blood Updated:  07/16/20 0628     Glucose 127 mg/dL      BUN 9 mg/dL      Creatinine 0.66 mg/dL      Sodium 136 mmol/L      Potassium 4.0 mmol/L      Chloride 98 mmol/L      CO2 27.0 mmol/L      Calcium 9.5 mg/dL      Total Protein 6.9 g/dL      Albumin 4.40 g/dL      ALT (SGPT) 58 U/L      AST (SGOT) 44 U/L      Alkaline Phosphatase 88 U/L      Total Bilirubin 0.6 mg/dL      eGFR Non African Amer 90 mL/min/1.73      Globulin 2.5 gm/dL      A/G Ratio 1.8 g/dL      BUN/Creatinine Ratio 13.6     Anion Gap 11.0 mmol/L     Narrative:       GFR Normal >60  Chronic  Kidney Disease <60  Kidney Failure <15      CBC (No Diff) [[id number]]  (Abnormal) Collected:  07/16/20 0544    Specimen:  Blood Updated:  07/16/20 0611     WBC 23.43 10*3/mm3      RBC 4.51 10*6/mm3      Hemoglobin 13.9 g/dL      Hematocrit 40.4 %      MCV 89.6 fL      MCH 30.8 pg      MCHC 34.4 g/dL      RDW 15.0 %      RDW-SD 49.4 fl      MPV 9.1 fL      Platelets 531 10*3/mm3     Blood Culture - Blood, Arm, Left [[id number]] Collected:  07/11/20 1315    Specimen:  Blood from Arm, Left Updated:  07/15/20 1415     Blood Culture No growth at 4 days    Blood Culture - Blood, Arm, Right [[id number]] Collected:  07/11/20 1137    Specimen:  Blood from Arm, Right Updated:  07/15/20 1200     Blood Culture No growth at 4 days    Tissue Pathology Exam [[id number]] Collected:  07/15/20 0925    Specimen:  Tissue from Gallbladder Updated:  07/15/20 1158    CBC Auto Differential [[id number]]  (Abnormal) Collected:  07/15/20 0445    Specimen:  Blood Updated:  07/15/20 0514     WBC 9.43 10*3/mm3      RBC 4.11 10*6/mm3      Hemoglobin 12.4 g/dL      Hematocrit 37.6 %      MCV 91.5 fL      MCH 30.2 pg      MCHC 33.0 g/dL      RDW 15.0 %      RDW-SD 50.0 fl      MPV 9.0 fL      Platelets 477 10*3/mm3      Neutrophil % 48.0 %      Lymphocyte % 38.4 %      Monocyte % 9.3 %      Eosinophil % 2.8 %      Basophil % 1.2 %      Immature Grans % 0.3 %      Neutrophils, Absolute 4.53 10*3/mm3      Lymphocytes, Absolute 3.62 10*3/mm3      Monocytes, Absolute 0.88 10*3/mm3      Eosinophils, Absolute 0.26 10*3/mm3      Basophils, Absolute 0.11 10*3/mm3      Immature Grans, Absolute 0.03 10*3/mm3      nRBC 0.0 /100 WBC     CBC Auto Differential [[id number]]  (Abnormal) Collected:  07/14/20 0421    Specimen:  Blood Updated:  07/14/20 0511     WBC 11.03 10*3/mm3      RBC 4.36 10*6/mm3      Hemoglobin 13.1 g/dL      Hematocrit 38.7 %      MCV 88.8 fL      MCH 30.0 pg      MCHC 33.9 g/dL      RDW 14.7 %      RDW-SD 47.7 fl      MPV 8.8 fL      Platelets  487 10*3/mm3     Manual Differential [[id number]]  (Abnormal) Collected:  07/14/20 0421    Specimen:  Blood Updated:  07/14/20 0511     Neutrophil % 48.5 %      Lymphocyte % 37.4 %      Monocyte % 5.1 %      Eosinophil % 1.0 %      Basophil % 2.0 %      Bands %  1.0 %      Atypical Lymphocyte % 5.1 %      Neutrophils Absolute 5.46 10*3/mm3      Lymphocytes Absolute 4.13 10*3/mm3      Monocytes Absolute 0.56 10*3/mm3      Eosinophils Absolute 0.11 10*3/mm3      Basophils Absolute 0.22 10*3/mm3      Polychromasia Slight/1+     WBC Morphology Normal     Platelet Morphology Normal    Comprehensive Metabolic Panel [[id number]]  (Abnormal) Collected:  07/14/20 0421    Specimen:  Blood Updated:  07/14/20 0507     Glucose 108 mg/dL      BUN 8 mg/dL      Creatinine 0.56 mg/dL      Sodium 136 mmol/L      Potassium 4.0 mmol/L      Chloride 101 mmol/L      CO2 26.0 mmol/L      Calcium 8.8 mg/dL      Total Protein 6.2 g/dL      Albumin 3.90 g/dL      ALT (SGPT) 18 U/L      AST (SGOT) 17 U/L      Alkaline Phosphatase 75 U/L      Total Bilirubin 0.5 mg/dL      eGFR Non African Amer 109 mL/min/1.73      Globulin 2.3 gm/dL      A/G Ratio 1.7 g/dL      BUN/Creatinine Ratio 14.3     Anion Gap 9.0 mmol/L     Narrative:       GFR Normal >60  Chronic Kidney Disease <60  Kidney Failure <15      Lipase [[id number]]  (Normal) Collected:  07/14/20 0421    Specimen:  Blood Updated:  07/14/20 0507     Lipase 18 U/L     Basic Metabolic Panel [[id number]]  (Abnormal) Collected:  07/13/20 0534    Specimen:  Blood Updated:  07/13/20 0715     Glucose 143 mg/dL      BUN 8 mg/dL      Creatinine 0.53 mg/dL      Sodium 134 mmol/L      Potassium 3.7 mmol/L      Chloride 98 mmol/L      CO2 24.0 mmol/L      Calcium 8.6 mg/dL      eGFR Non African Amer 116 mL/min/1.73      BUN/Creatinine Ratio 15.1     Anion Gap 12.0 mmol/L     Narrative:       GFR Normal >60  Chronic Kidney Disease <60  Kidney Failure <15      CBC Auto Differential [[id number]]  (Abnormal)  Collected:  07/13/20 0534    Specimen:  Blood Updated:  07/13/20 0643     WBC 14.03 10*3/mm3      RBC 4.72 10*6/mm3      Hemoglobin 14.1 g/dL      Hematocrit 42.1 %      MCV 89.2 fL      MCH 29.9 pg      MCHC 33.5 g/dL      RDW 14.8 %      RDW-SD 48.3 fl      MPV 9.2 fL      Platelets 564 10*3/mm3      Neutrophil % 68.3 %      Lymphocyte % 20.7 %      Monocyte % 9.7 %      Eosinophil % 0.3 %      Basophil % 0.6 %      Immature Grans % 0.4 %      Neutrophils, Absolute 9.57 10*3/mm3      Lymphocytes, Absolute 2.91 10*3/mm3      Monocytes, Absolute 1.36 10*3/mm3      Eosinophils, Absolute 0.04 10*3/mm3      Basophils, Absolute 0.09 10*3/mm3      Immature Grans, Absolute 0.06 10*3/mm3      nRBC 0.0 /100 WBC     CBC Auto Differential [[id number]]  (Abnormal) Collected:  07/12/20 0609    Specimen:  Blood Updated:  07/12/20 0712     WBC 17.23 10*3/mm3      RBC 4.47 10*6/mm3      Hemoglobin 13.3 g/dL      Hematocrit 39.9 %      MCV 89.3 fL      MCH 29.8 pg      MCHC 33.3 g/dL      RDW 15.2 %      RDW-SD 49.8 fl      MPV 9.3 fL      Platelets 573 10*3/mm3      Neutrophil % 73.0 %      Lymphocyte % 16.3 %      Monocyte % 9.5 %      Eosinophil % 0.2 %      Basophil % 0.5 %      Immature Grans % 0.5 %      Neutrophils, Absolute 12.59 10*3/mm3      Lymphocytes, Absolute 2.80 10*3/mm3      Monocytes, Absolute 1.64 10*3/mm3      Eosinophils, Absolute 0.04 10*3/mm3      Basophils, Absolute 0.08 10*3/mm3      Immature Grans, Absolute 0.08 10*3/mm3      nRBC 0.0 /100 WBC     Comprehensive Metabolic Panel [[id number]]  (Abnormal) Collected:  07/12/20 0609    Specimen:  Blood Updated:  07/12/20 0711     Glucose 134 mg/dL      BUN 11 mg/dL      Creatinine 0.58 mg/dL      Sodium 134 mmol/L      Potassium 3.7 mmol/L      Chloride 101 mmol/L      CO2 22.0 mmol/L      Calcium 8.4 mg/dL      Total Protein 6.7 g/dL      Albumin 4.00 g/dL      ALT (SGPT) 18 U/L      AST (SGOT) 14 U/L      Alkaline Phosphatase 93 U/L      Total Bilirubin 0.6  mg/dL      eGFR Non African Amer 105 mL/min/1.73      Globulin 2.7 gm/dL      A/G Ratio 1.5 g/dL      BUN/Creatinine Ratio 19.0     Anion Gap 11.0 mmol/L     Narrative:       GFR Normal >60  Chronic Kidney Disease <60  Kidney Failure <15      Magnesium [[id number]]  (Normal) Collected:  07/12/20 0609    Specimen:  Blood Updated:  07/12/20 0711     Magnesium 1.8 mg/dL     Urine Drug Screen - Urine, Clean Catch [[id number]]  (Abnormal) Collected:  07/11/20 1630    Specimen:  Urine, Clean Catch Updated:  07/11/20 1818     THC, Screen, Urine Positive     Phencyclidine (PCP), Urine Negative     Cocaine Screen, Urine Negative     Methamphetamine, Ur Negative     Opiate Screen Positive     Amphetamine Screen, Urine Negative     Benzodiazepine Screen, Urine Negative     Tricyclic Antidepressants Screen Negative     Methadone Screen, Urine Negative     Barbiturates Screen, Urine Negative     Oxycodone Screen, Urine Negative     Propoxyphene Screen Negative     Buprenorphine, Screen, Urine Negative    Narrative:       Cutoff For Drugs Screened:    Amphetamines               500 ng/ml  Barbiturates               200 ng/ml  Benzodiazepines            150 ng/ml  Cocaine                    150 ng/ml  Methadone                  200 ng/ml  Opiates                    100 ng/ml  Phencyclidine               25 ng/ml  THC                            50 ng/ml  Methamphetamine            500 ng/ml  Tricyclic Antidepressants  300 ng/ml  Oxycodone                  100 ng/ml  Propoxyphene               300 ng/ml  Buprenorphine               10 ng/ml    The normal value for all drugs tested is negative. This report includes unconfirmed screening results, with the cutoff values listed, to be used for medical treatment purposes only.  Unconfirmed results must not be used for non-medical purposes such as employment or legal testing.  Clinical consideration should be applied to any drug of abuse test, particularly when unconfirmed results are used.       Urinalysis With Culture If Indicated - Urine, Clean Catch [[id number]]  (Abnormal) Collected:  07/11/20 1630    Specimen:  Urine, Clean Catch Updated:  07/11/20 1652     Color, UA Yellow     Appearance, UA Clear     pH, UA 7.0     Specific Gravity, UA >1.030     Glucose, UA Negative     Ketones, UA Negative     Bilirubin, UA Negative     Blood, UA Moderate (2+)     Protein,  mg/dL (2+)     Leuk Esterase, UA Negative     Nitrite, UA Negative     Urobilinogen, UA 0.2 E.U./dL    Urinalysis, Microscopic Only - Urine, Clean Catch [[id number]]  (Abnormal) Collected:  07/11/20 1630    Specimen:  Urine, Clean Catch Updated:  07/11/20 1652     RBC, UA 31-50 /HPF      WBC, UA 0-2 /HPF      Bacteria, UA None Seen /HPF      Squamous Epithelial Cells, UA None Seen /HPF      Hyaline Casts, UA None Seen /LPF      Methodology Automated Microscopy    COVID PRE-OP / PRE-PROCEDURE SCREENING ORDER (NO ISOLATION) - Swab, Nasopharynx [[id number]] Collected:  07/11/20 1602    Specimen:  Swab from Nasopharynx Updated:  07/11/20 1648    Narrative:       The following orders were created for panel order COVID PRE-OP / PRE-PROCEDURE SCREENING ORDER (NO ISOLATION) - Swab, Nasopharynx.  Procedure                               Abnormality         Status                     ---------                               -----------         ------                     COVID-19, ABBOTT IN-HOUS...[[id number]]  Normal              Final result                 Please view results for these tests on the individual orders.    COVID-19, ABBOTT IN-HOUSE,NP Swab (NO TRANSPORT MEDIA) 2 HR TAT - Swab, Nasopharynx [[id number]]  (Normal) Collected:  07/11/20 1602    Specimen:  Swab from Nasopharynx Updated:  07/11/20 1648     COVID19 Not Detected    Narrative:       Fact sheet for providers: https://www.fda.gov/media/040421/download     Fact sheet for patients: https://www.fda.gov/media/499466/download    Troponin [[id number]]  (Normal) Collected:  07/11/20 1615     Specimen:  Blood Updated:  07/11/20 1637     Troponin T 0.022 ng/mL     Narrative:       Troponin T Reference Range:  <= 0.03 ng/mL-   Negative for AMI  >0.03 ng/mL-     Abnormal for myocardial necrosis.  Clinicians would have to utilize clinical acumen, EKG, Troponin and serial changes to determine if it is an Acute Myocardial Infarction or myocardial injury due to an underlying chronic condition.       Results may be falsely decreased if patient taking Biotin.      Troponin [[id number]]  (Normal) Collected:  07/11/20 1137    Specimen:  Blood Updated:  07/11/20 1624     Troponin T 0.022 ng/mL     Narrative:       Troponin T Reference Range:  <= 0.03 ng/mL-   Negative for AMI  >0.03 ng/mL-     Abnormal for myocardial necrosis.  Clinicians would have to utilize clinical acumen, EKG, Troponin and serial changes to determine if it is an Acute Myocardial Infarction or myocardial injury due to an underlying chronic condition.       Results may be falsely decreased if patient taking Biotin.      Lactic Acid, Reflex [[id number]]  (Normal) Collected:  07/11/20 1537    Specimen:  Blood Updated:  07/11/20 1608     Lactate 1.6 mmol/L     Lactic Acid, Reflex Timer (This will reflex a repeat order 3-3:15 hours after ordered.) [[id number]] Collected:  07/11/20 1137    Specimen:  Blood Updated:  07/11/20 1515     Hold Tube Hold for add-ons.     Comment: Auto resulted.       Lipase [[id number]]  (Normal) Collected:  07/11/20 1137    Specimen:  Blood Updated:  07/11/20 1313     Lipase 15 U/L     Lactic Acid, Plasma [[id number]]  (Abnormal) Collected:  07/11/20 1137    Specimen:  Blood Updated:  07/11/20 1211     Lactate 3.0 mmol/L     Comprehensive Metabolic Panel [[id number]]  (Abnormal) Collected:  07/11/20 1137    Specimen:  Blood Updated:  07/11/20 1209     Glucose 178 mg/dL      BUN 21 mg/dL      Creatinine 0.65 mg/dL      Sodium 134 mmol/L      Potassium 3.6 mmol/L      Chloride 94 mmol/L      CO2 23.0 mmol/L      Calcium 10.3  mg/dL      Total Protein 8.4 g/dL      Albumin 4.90 g/dL      ALT (SGPT) 30 U/L      AST (SGOT) 22 U/L      Alkaline Phosphatase 132 U/L      Total Bilirubin 0.6 mg/dL      eGFR Non African Amer 92 mL/min/1.73      Globulin 3.5 gm/dL      A/G Ratio 1.4 g/dL      BUN/Creatinine Ratio 32.3     Anion Gap 17.0 mmol/L     Narrative:       GFR Normal >60  Chronic Kidney Disease <60  Kidney Failure <15      CBC Auto Differential [[id number]]  (Abnormal) Collected:  07/11/20 1137    Specimen:  Blood Updated:  07/11/20 1154     WBC 22.10 10*3/mm3      RBC 5.19 10*6/mm3      Hemoglobin 15.9 g/dL      Hematocrit 45.9 %      MCV 88.4 fL      MCH 30.6 pg      MCHC 34.6 g/dL      RDW 15.6 %      RDW-SD 50.2 fl      MPV 8.9 fL      Platelets 722 10*3/mm3      Neutrophil % 82.4 %      Lymphocyte % 10.3 %      Monocyte % 6.1 %      Eosinophil % 0.1 %      Basophil % 0.5 %      Immature Grans % 0.6 %      Neutrophils, Absolute 18.23 10*3/mm3      Lymphocytes, Absolute 2.27 10*3/mm3      Monocytes, Absolute 1.34 10*3/mm3      Eosinophils, Absolute 0.02 10*3/mm3      Basophils, Absolute 0.10 10*3/mm3      Immature Grans, Absolute 0.14 10*3/mm3      nRBC 0.0 /100 WBC         Hospital Course:  The patient was admitted on 7/11 by Dr. Butler.  She presented to the emergency department after having problems with vomiting and abdominal pain followed by a loose stool for several days duration.  She was felt to have viral gastroenteritis.  She was admitted secondary to volume depletion.  She has been unable to produce a stool sample since being admitted.       Original CT from 7/11 failed to show any acute intra-abdominal or pelvic abnormality.  CT angiogram of the abdomen and pelvis on 7/13 showed a hyperdense gallbladder compatible with sludge or tiny stones.  Normal celiac artery and SMA.  No acute abnormality seen within the abdomen or pelvis.  I followed this with a gallbladder ultrasound that confirmed it to be filled with sludge, but  "no shadowing stones or bile duct dilation.  A HIDA scan today shows an EF of 25%.  I consulted general surgery to consider cholecystectomy.  Dr. Mclain did a laparoscopic cholecystectomy this morning.  Her gallbladder was found to be distended. She has some mildly elevated transaminases postsurgically.  She has had a recurrence of leukocytosis postsurgically, but I feel that this is reactive.  She continues to not run any fever and has no other focal symptoms of infection.     IV Pepcid was used.     Offered a nicotine patch and counseled for tobacco cessation.  Continue Symbicort.  She has a solitary pulmonary nodule on CT.  She will need this followed by her primary care physician in 3-6 months.     SCDs were used for DVT prophylaxis.    She has been cleared for discharge by general surgery.  She feels much better.  She tolerated diet this morning without difficulty.  Pain medication prescription written by general surgery.  I will also prescribe her some nausea medication.  She needs to follow-up with her primary care doctor in 1 week.  She needs to see general surgery per their instructions.    Physical Exam on Discharge:  BP 99/62 (BP Location: Right arm, Patient Position: Sitting)   Pulse 104   Temp 98.9 °F (37.2 °C) (Oral)   Resp 18   Ht 165.1 cm (65\")   Wt 43.5 kg (96 lb)   SpO2 98%   BMI 15.98 kg/m²    Physical Exam  Constitutional: She is oriented to person, place, and time.   Up and ambulatory in the room. No family present. Discussed with her nurse,  Brii.   Head: Normocephalic and atraumatic.   Eyes: Pupils are equal, round, and reactive to light. Conjunctivae and EOM are normal.   Neck: Neck supple. No JVD present.   Cardiovascular: Normal rate and regular rhythm.   Pulmonary/Chest: Effort normal. No respiratory distress.   Abdominal: Soft. Bowel sounds are normal. She exhibits no distension. There is mild tenderness around the laparoscopic sites, which are well dressed and clean. There is no " rebound and no guarding.   Musculoskeletal: Normal range of motion. She exhibits no edema, tenderness or deformity.   Neurological: She is alert and oriented to person, place, and time. She displays normal reflexes. No cranial nerve deficit. She exhibits normal muscle tone.  Skin: Skin is warm and dry. No rash noted.   Psychiatric:  Brighter affect today.  States that she feels better and wants to go home.     Condition on Discharge: Good.     Discharge Disposition:  Home or Self Care    Discharge Medications:     Discharge Medications      New Medications      Instructions Start Date   HYDROcodone-acetaminophen 7.5-325 MG per tablet  Commonly known as:  NORCO   1 tablet, Oral, Every 4 Hours PRN      ondansetron ODT 4 MG disintegrating tablet  Commonly known as:  Zofran ODT   4 mg, Translingual, Every 8 Hours PRN         Continue These Medications      Instructions Start Date   albuterol sulfate  (90 Base) MCG/ACT inhaler  Commonly known as:  PROVENTIL HFA;VENTOLIN HFA;PROAIR HFA   2 puffs, Inhalation, Every 4 Hours PRN      budesonide-formoterol 160-4.5 MCG/ACT inhaler  Commonly known as:  SYMBICORT   2 puffs, Inhalation, 2 Times Daily - RT      buprenorphine-naloxone 8-2 MG per SL tablet  Commonly known as:  SUBOXONE   1.5 tablets, Sublingual, Daily           Discharge Diet:   Diet Instructions     Diet: Regular; Thin      Discharge Diet:  Regular    Fluid Consistency:  Thin        Activity at Discharge:   Activity Instructions     Activity as Tolerated      Other Activity Instructions      Other Activity Instructions per Dr. Mclain        Follow-up Appointments:   1. PCP in 1 week.   2. Dr. Mclain per him.     Test Results Pending at Discharge: [facility].   Matias Espino DO  07/16/20  09:44    Time: 35 minutes.           Electronically signed by Matias Espino DO at 07/16/20 0949       Discharge Order (From admission, onward)     Start     Ordered    07/16/20 0941  Discharge patient  Once      Expected Discharge Date:  07/16/20    Discharge Disposition:  Home or Self Care    Physician of Record for Attribution - Please select from Treatment Team:  BETTE CHINCHILLA [[id number]]    Review needed by CMO to determine Physician of Record:  No       Question Answer Comment   Physician of Record for Attribution - Please select from Treatment Team BETTE CHINCHILLA    Review needed by CMO to determine Physician of Record No        07/16/20 0942

## 2020-07-17 ENCOUNTER — READMISSION MANAGEMENT (OUTPATIENT)
Dept: CALL CENTER | Facility: HOSPITAL | Age: 65
End: 2020-07-17

## 2020-07-17 NOTE — OUTREACH NOTE
Prep Survey      Responses   [facility] facility patient discharged from?  [place]   Is LACE score < 7 ?  No   Eligibility  Readm Mgmt   Discharge diagnosis  Viral gastroenteritis: Laparoscopic cholecystectomy    COVID-19 Test Status  Negative   Does the patient have one of the following disease processes/diagnoses(primary or secondary)?  General Surgery   Does the patient have Home health ordered?  No   Is there a DME ordered?  No   Prep survey completed?  Yes          Paige Vasques RN

## 2020-07-20 ENCOUNTER — READMISSION MANAGEMENT (OUTPATIENT)
Dept: CALL CENTER | Facility: HOSPITAL | Age: 65
End: 2020-07-20

## 2020-07-20 ENCOUNTER — DOCUMENTATION (OUTPATIENT)
Dept: CT IMAGING | Facility: HOSPITAL | Age: 65
End: 2020-07-20

## 2020-07-20 LAB
CYTO UR: NORMAL
LAB AP CASE REPORT: NORMAL
PATH REPORT.FINAL DX SPEC: NORMAL
PATH REPORT.GROSS SPEC: NORMAL

## 2020-07-20 NOTE — PROGRESS NOTES
Notification letter sent to patient explaining a recent imaging exam showed an incidental finding, for which follow-up testing was recommended.

## 2020-07-20 NOTE — OUTREACH NOTE
General Surgery Week 1 Survey      Responses   [facility] patient discharged from?  [place]   Does the patient have one of the following disease processes/diagnoses(primary or secondary)?  General Surgery   Is there a successful TCM telephone encounter documented?  No   Week 1 attempt successful?  Yes   Call start time  1231   Call end time  1234   Discharge diagnosis  Viral gastroenteritis: Laparoscopic cholecystectomy    Person spoke with today (if not patient) and relationship  Patient    Meds reviewed with patient/caregiver?  Yes   Is the patient having any side effects they believe may be caused by any medication additions or changes?  No   Does the patient have all medications related to this admission filled (includes all antibiotics, pain medications, etc.)  Yes   Is the patient taking all medications as directed (includes completed medication regime)?  Yes   Does the patient have a follow up appointment scheduled with their surgeon?  Yes [8/6/20]   Has the patient kept scheduled appointments due by today?  N/A   Comments  PCP appt on 7/23/20   Psychosocial issues?  No   Did the patient receive a copy of their discharge instructions?  Yes   Nursing interventions  Reviewed instructions with patient   What is the patient's perception of their health status since discharge?  Improving   Nursing interventions  Nurse provided patient education   Is the patient /caregiver able to teach back basic post-op care?  Take showers only when approved by MD-sponge bathe until then, No tub bath, swimming, or hot tub until instructed by MD, Keep incision areas clean,dry and protected, Lifting as instructed by MD in discharge instructions, Drive as instructed by MD in discharge instructions, Continue use of incentive spirometry at least 1 week post discharge   Is the patient/caregiver able to teach back signs and symptoms of incisional infection?  Fever, Increased drainage or bleeding, Increased redness, swelling or  pain at the incisonal site   Is the patient/caregiver able to teach back steps to recovery at home?  Set small, achievable goals for return to baseline health, Rest and rebuild strength, gradually increase activity, Eat a well-balance diet   If the patient is a current smoker, are they able to teach back resources for cessation?  Smoking cessation medications, [id number] [Smoker]   Is the patient/caregiver able to teach back the hierarchy of who to call/visit for symptoms/problems? PCP, Specialist, Home health nurse, Urgent Care, ED, 911  Yes   Week 1 call completed?  Yes          Claire Valencia RN

## 2020-07-28 ENCOUNTER — READMISSION MANAGEMENT (OUTPATIENT)
Dept: CALL CENTER | Facility: HOSPITAL | Age: 65
End: 2020-07-28

## 2020-07-28 NOTE — OUTREACH NOTE
General Surgery Week 2 Survey      Responses   [facility] patient discharged from?  [place]   Does the patient have one of the following disease processes/diagnoses(primary or secondary)?  General Surgery   Week 2 attempt successful?  No   Unsuccessful attempts  Attempt 1          Brunilda Guzman LPN

## 2020-07-30 ENCOUNTER — READMISSION MANAGEMENT (OUTPATIENT)
Dept: CALL CENTER | Facility: HOSPITAL | Age: 65
End: 2020-07-30

## 2020-07-31 NOTE — OUTREACH NOTE
General Surgery Week 2 Survey      Responses   [facility] patient discharged from?  [place]   Does the patient have one of the following disease processes/diagnoses(primary or secondary)?  General Surgery   Week 2 attempt successful?  No   Unsuccessful attempts  Attempt 2   Rescheduled  Revoked   Revoke  Decline to participate          Catherine Koehler RN

## 2021-01-21 ENCOUNTER — TRANSCRIBE ORDERS (OUTPATIENT)
Dept: ADMINISTRATIVE | Facility: HOSPITAL | Age: 66
End: 2021-01-21

## 2021-01-21 DIAGNOSIS — F17.210 NICOTINE DEPENDENCE, CIGARETTES, UNCOMPLICATED: Primary | ICD-10-CM

## 2021-02-01 ENCOUNTER — TRANSCRIBE ORDERS (OUTPATIENT)
Dept: ADMINISTRATIVE | Facility: HOSPITAL | Age: 66
End: 2021-02-01

## 2021-02-01 ENCOUNTER — HOSPITAL ENCOUNTER (OUTPATIENT)
Dept: CT IMAGING | Facility: HOSPITAL | Age: 66
End: 2021-02-01

## 2021-02-01 DIAGNOSIS — Z12.31 SCREENING MAMMOGRAM, ENCOUNTER FOR: Primary | ICD-10-CM

## 2021-02-10 ENCOUNTER — HOSPITAL ENCOUNTER (OUTPATIENT)
Dept: CT IMAGING | Facility: HOSPITAL | Age: 66
Discharge: HOME | End: 2021-02-10

## 2021-02-10 ENCOUNTER — HOSPITAL ENCOUNTER (OUTPATIENT)
Dept: MAMMOGRAPHY | Facility: HOSPITAL | Age: 66
Discharge: HOME | End: 2021-02-10

## 2021-02-10 DIAGNOSIS — F17.210 NICOTINE DEPENDENCE, CIGARETTES, UNCOMPLICATED: ICD-10-CM

## 2021-02-10 PROCEDURE — 71271 CT THORAX LUNG CANCER SCR C-: CPT

## 2021-02-10 PROCEDURE — 77067 SCR MAMMO BI INCL CAD: CPT

## 2021-02-10 PROCEDURE — 77063 BREAST TOMOSYNTHESIS BI: CPT

## 2021-02-25 ENCOUNTER — TRANSCRIBE ORDERS (OUTPATIENT)
Dept: ADMINISTRATIVE | Facility: HOSPITAL | Age: 66
End: 2021-02-25

## 2021-02-25 DIAGNOSIS — R92.8 OTHER ABNORMAL AND INCONCLUSIVE FINDINGS ON DIAGNOSTIC IMAGING OF BREAST: Primary | ICD-10-CM

## 2021-03-04 ENCOUNTER — HOSPITAL ENCOUNTER (OUTPATIENT)
Dept: MAMMOGRAPHY | Facility: HOSPITAL | Age: 66
Discharge: HOME | End: 2021-03-04

## 2021-03-04 ENCOUNTER — HOSPITAL ENCOUNTER (OUTPATIENT)
Dept: ULTRASOUND IMAGING | Facility: HOSPITAL | Age: 66
Discharge: HOME | End: 2021-03-04

## 2021-03-04 DIAGNOSIS — R92.8 OTHER ABNORMAL AND INCONCLUSIVE FINDINGS ON DIAGNOSTIC IMAGING OF BREAST: ICD-10-CM

## 2021-03-04 PROCEDURE — 76642 ULTRASOUND BREAST LIMITED: CPT

## 2021-03-04 PROCEDURE — 77066 DX MAMMO INCL CAD BI: CPT

## 2021-03-04 PROCEDURE — G0279 TOMOSYNTHESIS, MAMMO: HCPCS

## 2021-03-24 ENCOUNTER — OFFICE VISIT (OUTPATIENT)
Dept: GASTROENTEROLOGY | Facility: CLINIC | Age: 66
End: 2021-03-24

## 2021-03-24 VITALS
OXYGEN SATURATION: 98 % | HEIGHT: 65 IN | SYSTOLIC BLOOD PRESSURE: 152 MMHG | TEMPERATURE: 97.3 F | DIASTOLIC BLOOD PRESSURE: 90 MMHG | WEIGHT: 119 LBS | BODY MASS INDEX: 19.83 KG/M2 | HEART RATE: 71 BPM

## 2021-03-24 DIAGNOSIS — Z12.11 ENCOUNTER FOR SCREENING FOR MALIGNANT NEOPLASM OF COLON: Primary | ICD-10-CM

## 2021-03-24 DIAGNOSIS — Z71.6 TOBACCO ABUSE COUNSELING: ICD-10-CM

## 2021-03-24 PROCEDURE — S0260 H&P FOR SURGERY: HCPCS | Performed by: CLINICAL NURSE SPECIALIST

## 2021-03-24 RX ORDER — FLUTICASONE PROPIONATE 50 MCG
SPRAY, SUSPENSION (ML) NASAL
COMMUNITY
Start: 2021-03-03

## 2021-03-24 RX ORDER — LEVOTHYROXINE SODIUM 0.03 MG/1
12.5 TABLET ORAL DAILY
COMMUNITY

## 2021-03-24 NOTE — PROGRESS NOTES
Tea Sams  1955      3/24/2021  Chief Complaint   Patient presents with   • Colonoscopy     Subjective   HPI  Tea Sams is a 65 y.o. female who presents as a referral for preventative maintenance. She has no complaints of nausea or vomiting. No change in bowels. No wt loss. No BRBPR. No melena. There is NO family hx for colon cancer. No abdominal pain.  Past Medical History:   Diagnosis Date   • Arthritis    • COPD (chronic obstructive pulmonary disease) (CMS/HCC)    • GERD (gastroesophageal reflux disease)      Past Surgical History:   Procedure Laterality Date   • ABDOMINAL SURGERY     •  SECTION     • CHOLECYSTECTOMY WITH INTRAOPERATIVE CHOLANGIOGRAM N/A 7/15/2020    Procedure: CHOLECYSTECTOMY LAPAROSCOPIC;  Surgeon: Tanna Mclain MD;  Location: [facility];  Service: General;  Laterality: N/A;   • ENDOSCOPY N/A 10/20/2017    Food found in the esophagus removal was successful otherwise normal exam   • HERNIA REPAIR     • TONSILLECTOMY       Outpatient Medications Marked as Taking for the 3/24/21 encounter (Office Visit) with Kori Lopez APRN   Medication Sig Dispense Refill   • albuterol sulfate  (90 Base) MCG/ACT inhaler Inhale 2 puffs by mouth Every 4 (Four) Hours As Needed for Wheezing. 8.5 g 0   • budesonide-formoterol (SYMBICORT) 160-4.5 MCG/ACT inhaler Inhale 2 puffs 2 (Two) Times a Day. 1 inhaler 0   • buprenorphine-naloxone (SUBOXONE) 8-2 MG per SL tablet Place 1.5 tablets under the tongue Daily.     • fluticasone (FLONASE) 50 MCG/ACT nasal spray SPRAY 1-2 SPRAYS EACH NOSTRIL ONCE A DAY     • levothyroxine (SYNTHROID, LEVOTHROID) 12.5 MCG half tablet Take 12.5 mcg by mouth Daily.       No Known Allergies  Social History     Socioeconomic History   • Marital status: Single     Spouse name: Not on file   • Number of children: Not on file   • Years of education: Not on file   • Highest education level: Not on file   Tobacco Use   • Smoking status: Current Every Day  "Smoker     Packs/day: 1.00     Years: 40.00     Pack years: 40.00     Types: Cigarettes   • Smokeless tobacco: Never Used   Substance and Sexual Activity   • Alcohol use: No   • Drug use: No   • Sexual activity: Defer     Comment: recieved partient from the ED dept . No history with chart      Family History   Problem Relation Age of Onset   • Cancer Mother    • Cancer Father    • Breast cancer Maternal Aunt    • Colon cancer Neg Hx    • Colon polyps Neg Hx      Health Maintenance   Topic Date Due   • DXA SCAN  Never done   • COLONOSCOPY  Never done   • COVID-19 Vaccine (1) Never done   • TDAP/TD VACCINES (1 - Tdap) Never done   • ZOSTER VACCINE (1 of 2) Never done   • ANNUAL WELLNESS VISIT  Never done   • PAP SMEAR  Never done   • INFLUENZA VACCINE  08/01/2020   • LUNG CANCER SCREENING  02/10/2022   • MAMMOGRAM  03/04/2023   • Pneumococcal Vaccine 65+ (1 of 1 - PPSV23) 11/08/2023   • HEPATITIS C SCREENING  Completed   • MENINGOCOCCAL VACCINE  Aged Out       REVIEW OF SYSTEMS  General: well appearing, no fever chills or sweats, no unexplained wt loss  HEENT: no acute visual or hearing disturbances  Cardiovascular: No chest pain or palpitations  Pulmonary: No shortness of breath, coughing, wheezing or hemoptysis  : No burning, urgency, hematuria, or dysuria  Musculoskeletal: No joint pain or stiffness  Peripheral: no edema  Skin: No lesions or rashes  Neuro: No dizziness, headaches, stroke, syncope  Endocrine: No hot or cold intolerances  Hematological: No blood dyscrasias    Objective   Vitals:    03/24/21 1401   BP: 152/90   Pulse: 71   Temp: 97.3 °F (36.3 °C)   SpO2: 98%   Weight: 54 kg (119 lb)   Height: 165.1 cm (65\")     Body mass index is 19.8 kg/m².  Patient's Body mass index is 19.8 kg/m². BMI is within normal parameters. No follow-up required..      PHYSICAL EXAM  General: age appropriate well nourished well appearing, no acute distress  Head: normocephalic and atraumatic  Global " assessment-supple  Neck-No JVD noted, no lymphadenopathy  Pulmonary-clear to auscultation bilaterally, normal respiratory effort  Cardiovascular-normal rate and rhythm, normal heart sounds, S1 and S2 noted  Abdomen-soft, non tender, non distended, normal bowel sounds all 4 quadrants, no hepatosplenomegaly noted  Extremities-No clubbing cyanosis or edema  Neuro-Non focal, converses appropriately, awake, alert, oriented    Assessment/Plan     Diagnoses and all orders for this visit:    1. Encounter for screening for malignant neoplasm of colon (Primary)  -     Case Request; Standing  -     Follow Anesthesia Guidelines / Standing Orders; Future  -     Obtain Informed Consent; Future  -     Implement Anesthesia Orders Day of Procedure; Standing  -     Obtain Informed Consent; Standing  -     Verify bowel prep was successful; Standing  -     Case Request    2. Tobacco abuse counseling        COLONOSCOPY WITH ANESTHESIA (N/A)  Body mass index is 19.8 kg/m².    Patient instructions on prep prior to procedure provided to the patient.    All risks, benefits, alternatives, and indications of colonoscopy procedure have been discussed with the patient. Risks to include perforation of the colon requiring possible surgery or colostomy, risk of bleeding from biopsies or removal of colon tissue, possibility of missing a colon polyp or cancer, or adverse drug reaction.  Benefits to include the diagnosis and management of disease of the colon and rectum. Alternatives to include barium enema, radiographic evaluation, lab testing or no intervention. Pt verbalizes understanding and agrees.     Kori Lopez, APRN  3/24/2021  14:17 CDT      IF YOU SMOKE OR USE TOBACCO PLEASE READ THE FOLLOWIN minutes reading provided    Why is smoking bad for me?  Smoking increases the risk of heart disease, lung disease, vascular disease, stroke, and cancer.     If you smoke, STOP!    If you would like more information on quitting smoking,  please visit the [employer] website: [email or web address]/[email or web address]/[email or web address]   This link will provide additional resources including the QUIT line and the Beat the Pack support groups.     For more information:    Quit Now [place]  1-800-QUIT-NOW  https://[place].quitlogix.org/en-US/

## 2021-04-26 ENCOUNTER — TRANSCRIBE ORDERS (OUTPATIENT)
Dept: GASTROENTEROLOGY | Facility: CLINIC | Age: 66
End: 2021-04-26

## 2021-04-26 DIAGNOSIS — Z01.818 PREOPERATIVE TESTING: Primary | ICD-10-CM

## 2021-04-27 ENCOUNTER — LAB (OUTPATIENT)
Dept: LAB | Facility: HOSPITAL | Age: 66
End: 2021-04-27

## 2021-04-27 LAB — SARS-COV-2 ORF1AB RESP QL NAA+PROBE: NOT DETECTED

## 2021-04-27 PROCEDURE — C9803 HOPD COVID-19 SPEC COLLECT: HCPCS | Performed by: INTERNAL MEDICINE

## 2021-04-27 PROCEDURE — U0004 COV-19 TEST NON-CDC HGH THRU: HCPCS | Performed by: INTERNAL MEDICINE

## 2021-06-02 ENCOUNTER — ANESTHESIA (OUTPATIENT)
Dept: GASTROENTEROLOGY | Facility: HOSPITAL | Age: 66
End: 2021-06-02

## 2021-06-02 ENCOUNTER — HOSPITAL ENCOUNTER (OUTPATIENT)
Facility: HOSPITAL | Age: 66
Setting detail: HOSPITAL OUTPATIENT SURGERY
Discharge: HOME | End: 2021-06-02
Attending: INTERNAL MEDICINE | Admitting: INTERNAL MEDICINE

## 2021-06-02 ENCOUNTER — ANESTHESIA EVENT (OUTPATIENT)
Dept: GASTROENTEROLOGY | Facility: HOSPITAL | Age: 66
End: 2021-06-02

## 2021-06-02 VITALS
DIASTOLIC BLOOD PRESSURE: 75 MMHG | WEIGHT: 118 LBS | TEMPERATURE: 96.2 F | RESPIRATION RATE: 20 BRPM | SYSTOLIC BLOOD PRESSURE: 120 MMHG | HEART RATE: 88 BPM | BODY MASS INDEX: 19.66 KG/M2 | HEIGHT: 65 IN | OXYGEN SATURATION: 94 %

## 2021-06-02 DIAGNOSIS — Z12.11 ENCOUNTER FOR SCREENING FOR MALIGNANT NEOPLASM OF COLON: ICD-10-CM

## 2021-06-02 PROCEDURE — 45380 COLONOSCOPY AND BIOPSY: CPT | Performed by: INTERNAL MEDICINE

## 2021-06-02 PROCEDURE — 25010000002 PROPOFOL 10 MG/ML EMULSION: Performed by: NURSE ANESTHETIST, CERTIFIED REGISTERED

## 2021-06-02 PROCEDURE — 88305 TISSUE EXAM BY PATHOLOGIST: CPT | Performed by: INTERNAL MEDICINE

## 2021-06-02 RX ORDER — LIDOCAINE HYDROCHLORIDE 20 MG/ML
INJECTION, SOLUTION EPIDURAL; INFILTRATION; INTRACAUDAL; PERINEURAL AS NEEDED
Status: DISCONTINUED | OUTPATIENT
Start: 2021-06-02 | End: 2021-06-02 | Stop reason: SURG

## 2021-06-02 RX ORDER — SODIUM CHLORIDE 0.9 % (FLUSH) 0.9 %
10 SYRINGE (ML) INJECTION EVERY 12 HOURS SCHEDULED
Status: CANCELLED | OUTPATIENT
Start: 2021-06-02

## 2021-06-02 RX ORDER — MIDAZOLAM HYDROCHLORIDE 1 MG/ML
0.5 INJECTION, SOLUTION INTRAMUSCULAR; INTRAVENOUS
Status: CANCELLED | OUTPATIENT
Start: 2021-06-02

## 2021-06-02 RX ORDER — SODIUM CHLORIDE 9 MG/ML
100 INJECTION, SOLUTION INTRAVENOUS CONTINUOUS
Status: CANCELLED | OUTPATIENT
Start: 2021-06-02

## 2021-06-02 RX ORDER — SODIUM CHLORIDE 0.9 % (FLUSH) 0.9 %
10 SYRINGE (ML) INJECTION AS NEEDED
Status: DISCONTINUED | OUTPATIENT
Start: 2021-06-02 | End: 2021-06-02 | Stop reason: HOSPADM

## 2021-06-02 RX ORDER — SODIUM CHLORIDE 0.9 % (FLUSH) 0.9 %
10 SYRINGE (ML) INJECTION AS NEEDED
Status: CANCELLED | OUTPATIENT
Start: 2021-06-02

## 2021-06-02 RX ORDER — PROPOFOL 10 MG/ML
VIAL (ML) INTRAVENOUS AS NEEDED
Status: DISCONTINUED | OUTPATIENT
Start: 2021-06-02 | End: 2021-06-02 | Stop reason: SURG

## 2021-06-02 RX ORDER — SODIUM CHLORIDE 9 MG/ML
500 INJECTION, SOLUTION INTRAVENOUS CONTINUOUS PRN
Status: DISCONTINUED | OUTPATIENT
Start: 2021-06-02 | End: 2021-06-02 | Stop reason: HOSPADM

## 2021-06-02 RX ORDER — MIDAZOLAM HYDROCHLORIDE 1 MG/ML
1 INJECTION, SOLUTION INTRAMUSCULAR; INTRAVENOUS
Status: CANCELLED | OUTPATIENT
Start: 2021-06-02

## 2021-06-02 RX ORDER — LIDOCAINE HYDROCHLORIDE 10 MG/ML
0.5 INJECTION, SOLUTION EPIDURAL; INFILTRATION; INTRACAUDAL; PERINEURAL ONCE AS NEEDED
Status: CANCELLED | OUTPATIENT
Start: 2021-06-02

## 2021-06-02 RX ADMIN — LIDOCAINE HYDROCHLORIDE 100 MG: 20 INJECTION, SOLUTION EPIDURAL; INFILTRATION; INTRACAUDAL; PERINEURAL at 12:54

## 2021-06-02 RX ADMIN — SODIUM CHLORIDE 500 ML: 9 INJECTION, SOLUTION INTRAVENOUS at 12:27

## 2021-06-02 RX ADMIN — PROPOFOL 125 MG: 10 INJECTION, EMULSION INTRAVENOUS at 12:54

## 2021-06-02 NOTE — H&P
[facility] Gastroenterology  Pre Procedure History & Physical    Chief Complaint:   Screening    Subjective     HPI:   Here for screening colonoscopy.  No symptoms.    Past Medical History:   Past Medical History:   Diagnosis Date   • Arthritis    • COPD (chronic obstructive pulmonary disease) (CMS/HCC)    • GERD (gastroesophageal reflux disease)        Past Surgical History:  Past Surgical History:   Procedure Laterality Date   • ABDOMINAL SURGERY     •  SECTION     • CHOLECYSTECTOMY WITH INTRAOPERATIVE CHOLANGIOGRAM N/A 7/15/2020    Procedure: CHOLECYSTECTOMY LAPAROSCOPIC;  Surgeon: Tanna Mclain MD;  Location: [facility];  Service: General;  Laterality: N/A;   • ENDOSCOPY N/A 10/20/2017    Food found in the esophagus removal was successful otherwise normal exam   • HERNIA REPAIR     • TONSILLECTOMY         Family History:  Family History   Problem Relation Age of Onset   • Cancer Mother    • Cancer Father    • Breast cancer Maternal Aunt    • Colon cancer Neg Hx    • Colon polyps Neg Hx        Social History:   reports that she has been smoking cigarettes. She has a 40.00 pack-year smoking history. She has never used smokeless tobacco. She reports that she does not drink alcohol and does not use drugs.    Medications:   Prior to Admission medications    Medication Sig Start Date End Date Taking? Authorizing Provider   albuterol sulfate  (90 Base) MCG/ACT inhaler Inhale 2 puffs by mouth Every 4 (Four) Hours As Needed for Wheezing. 19  Yes Ebony Chen PA   budesonide-formoterol (SYMBICORT) 160-4.5 MCG/ACT inhaler Inhale 2 puffs 2 (Two) Times a Day. 19  Yes Ebony Chen PA   buprenorphine-naloxone (SUBOXONE) 8-2 MG per SL tablet Place 1.5 tablets under the tongue Daily.   Yes ProviderConsuelo MD   fluticasone (FLONASE) 50 MCG/ACT nasal spray SPRAY 1-2 SPRAYS EACH NOSTRIL ONCE A DAY 3/3/21  Yes Consuelo Glover MD   levothyroxine (SYNTHROID, LEVOTHROID)  "12.5 MCG half tablet Take 12.5 mcg by mouth Daily.   Yes Provider, Consuelo, MD       Allergies:  Patient has no known allergies.    Objective     Blood pressure 146/92, pulse 106, temperature 96.2 °F (35.7 °C), temperature source Temporal, resp. rate 22, height 165.1 cm (65\"), weight 53.5 kg (118 lb), SpO2 94 %, not currently breastfeeding.    Physical Exam   Constitutional: Pt is oriented to person, place, and in no distress.   HENT: Mouth/Throat: Oropharynx is clear.   Cardiovascular: Normal rate, regular rhythm.    Pulmonary/Chest: Effort normal. No respiratory distress. No  wheezes.   Abdominal: Soft. Non-distended.  Skin: Skin is warm and dry.   Psychiatric: Mood, memory, affect and judgment appear normal.     Assessment/Plan     Diagnosis:  Screening colonoscopy    Anticipated Surgical Procedure:    Proceed with colonoscopy as scheduled    The following major R/B/A were discussed with the patient, however the list is not all inclusive . Risk:  Bleeding (immediate and delayed), perforation (rupture or tear), reaction to medication, missed lesion/cancer, pain during the procedure, infection, need for surgery, need for ostomy, need for mechanical ventilation (breathing machine), death.  Benefits: removal of polyp/tissue, burn/clip/or inject to stop bleeding, removal of foreign body, dilate any stricture.  Alternatives: Xray or CT, surgery, do nothing with associated risk   The patient was given time to ask question and received explanation, and agrees to proceed as per History and Physical.   No guarantee given or expressed.    EMR Dragon/transcription disclaimer: Much of this encounter note is an electronic transcription/translation of spoken language to printed text.  The electronic translation of spoken language may permit erroneous, or at times, nonsensical words or phrases to be inadvertently transcribed.  Although I have reviewed the note for such errors, some may still exist.    Femi Goddard, " MD  12:55 CDT  6/2/2021

## 2021-06-02 NOTE — ANESTHESIA POSTPROCEDURE EVALUATION
"Patient: Tea PLUMMER Latrell    Procedure Summary     Date: 06/02/21 Room / Location: [facility] ENDOSCOPY 4 / [facility]    Anesthesia Start: 1252 Anesthesia Stop: 1309    Procedure: COLONOSCOPY WITH ANESTHESIA (N/A ) Diagnosis:       Encounter for screening for malignant neoplasm of colon      (Encounter for screening for malignant neoplasm of colon [Z12.11])    Surgeons: Femi Goddard MD Provider: Teri Cerda CRNA    Anesthesia Type: MAC ASA Status: 2          Anesthesia Type: MAC    Vitals  Vitals Value Taken Time   /75 06/02/21 1325   Temp     Pulse 82 06/02/21 1330   Resp 20 06/02/21 1325   SpO2 96 % 06/02/21 1330   Vitals shown include unvalidated device data.        Post Anesthesia Care and Evaluation    Patient location during evaluation: bedside  Patient participation: complete - patient participated  Level of consciousness: awake and awake and alert  Pain score: 0  Pain management: adequate  Airway patency: patent  Anesthetic complications: No anesthetic complications  PONV Status: none  Cardiovascular status: acceptable  Respiratory status: acceptable  Hydration status: acceptable    Comments: Patient discharged according to acceptable Franchesca score per RN assessment. See nursing records for further information.     Blood pressure 120/75, pulse 88, temperature 96.2 °F (35.7 °C), temperature source Temporal, resp. rate 20, height 165.1 cm (65\"), weight 53.5 kg (118 lb), SpO2 94 %, not currently breastfeeding.        "

## 2021-06-03 LAB
LAB AP CASE REPORT: NORMAL
LAB AP CLINICAL INFORMATION: NORMAL
PATH REPORT.FINAL DX SPEC: NORMAL
PATH REPORT.GROSS SPEC: NORMAL

## 2021-06-10 ENCOUNTER — TELEPHONE (OUTPATIENT)
Dept: GASTROENTEROLOGY | Facility: CLINIC | Age: 66
End: 2021-06-10

## 2021-07-01 ENCOUNTER — PREP FOR SURGERY (OUTPATIENT)
Dept: GASTROENTEROLOGY | Facility: CLINIC | Age: 66
End: 2021-07-01

## 2021-07-01 DIAGNOSIS — Z12.11 ENCOUNTER FOR SCREENING FOR MALIGNANT NEOPLASM OF COLON: Primary | ICD-10-CM

## 2021-08-24 ENCOUNTER — ANESTHESIA EVENT (OUTPATIENT)
Dept: GASTROENTEROLOGY | Facility: HOSPITAL | Age: 66
End: 2021-08-24

## 2021-08-24 ENCOUNTER — ANESTHESIA (OUTPATIENT)
Dept: GASTROENTEROLOGY | Facility: HOSPITAL | Age: 66
End: 2021-08-24

## 2021-08-24 ENCOUNTER — HOSPITAL ENCOUNTER (OUTPATIENT)
Facility: HOSPITAL | Age: 66
Setting detail: HOSPITAL OUTPATIENT SURGERY
Discharge: HOME | End: 2021-08-24
Attending: INTERNAL MEDICINE | Admitting: INTERNAL MEDICINE

## 2021-08-24 VITALS
HEIGHT: 65 IN | RESPIRATION RATE: 15 BRPM | DIASTOLIC BLOOD PRESSURE: 79 MMHG | OXYGEN SATURATION: 98 % | BODY MASS INDEX: 18.83 KG/M2 | WEIGHT: 113 LBS | TEMPERATURE: 97.6 F | SYSTOLIC BLOOD PRESSURE: 114 MMHG | HEART RATE: 73 BPM

## 2021-08-24 DIAGNOSIS — Z12.11 ENCOUNTER FOR SCREENING FOR MALIGNANT NEOPLASM OF COLON: ICD-10-CM

## 2021-08-24 PROCEDURE — 88305 TISSUE EXAM BY PATHOLOGIST: CPT | Performed by: INTERNAL MEDICINE

## 2021-08-24 PROCEDURE — 45385 COLONOSCOPY W/LESION REMOVAL: CPT | Performed by: INTERNAL MEDICINE

## 2021-08-24 PROCEDURE — 25010000002 PROPOFOL 10 MG/ML EMULSION: Performed by: NURSE ANESTHETIST, CERTIFIED REGISTERED

## 2021-08-24 RX ORDER — SODIUM CHLORIDE 0.9 % (FLUSH) 0.9 %
10 SYRINGE (ML) INJECTION AS NEEDED
Status: DISCONTINUED | OUTPATIENT
Start: 2021-08-24 | End: 2021-08-24 | Stop reason: HOSPADM

## 2021-08-24 RX ORDER — LIDOCAINE HYDROCHLORIDE 10 MG/ML
0.5 INJECTION, SOLUTION EPIDURAL; INFILTRATION; INTRACAUDAL; PERINEURAL ONCE AS NEEDED
Status: DISCONTINUED | OUTPATIENT
Start: 2021-08-24 | End: 2021-08-24 | Stop reason: HOSPADM

## 2021-08-24 RX ORDER — SODIUM CHLORIDE 9 MG/ML
500 INJECTION, SOLUTION INTRAVENOUS CONTINUOUS PRN
Status: DISCONTINUED | OUTPATIENT
Start: 2021-08-24 | End: 2021-08-24 | Stop reason: HOSPADM

## 2021-08-24 RX ORDER — PROPOFOL 10 MG/ML
VIAL (ML) INTRAVENOUS AS NEEDED
Status: DISCONTINUED | OUTPATIENT
Start: 2021-08-24 | End: 2021-08-24 | Stop reason: SURG

## 2021-08-24 RX ORDER — DOXYCYCLINE HYCLATE 100 MG/1
100 CAPSULE ORAL 2 TIMES DAILY
COMMUNITY
End: 2022-09-05

## 2021-08-24 RX ORDER — LIDOCAINE HYDROCHLORIDE 20 MG/ML
INJECTION, SOLUTION EPIDURAL; INFILTRATION; INTRACAUDAL; PERINEURAL AS NEEDED
Status: DISCONTINUED | OUTPATIENT
Start: 2021-08-24 | End: 2021-08-24 | Stop reason: SURG

## 2021-08-24 RX ADMIN — PROPOFOL 240 MG: 10 INJECTION, EMULSION INTRAVENOUS at 12:21

## 2021-08-24 RX ADMIN — LIDOCAINE HYDROCHLORIDE 50 MG: 20 INJECTION, SOLUTION EPIDURAL; INFILTRATION; INTRACAUDAL; PERINEURAL at 12:21

## 2021-08-24 RX ADMIN — SODIUM CHLORIDE 500 ML: 9 INJECTION, SOLUTION INTRAVENOUS at 10:47

## 2021-08-24 NOTE — H&P
[facility] Gastroenterology  Pre Procedure History & Physical    Chief Complaint:   History of polyps    Subjective     HPI:   Here for colonoscopy.  History of polyps    Past Medical History:   Past Medical History:   Diagnosis Date   • Arthritis    • COPD (chronic obstructive pulmonary disease) (CMS/HCC)    • GERD (gastroesophageal reflux disease)        Past Surgical History:  Past Surgical History:   Procedure Laterality Date   • ABDOMINAL SURGERY     •  SECTION     • CHOLECYSTECTOMY WITH INTRAOPERATIVE CHOLANGIOGRAM N/A 7/15/2020    Procedure: CHOLECYSTECTOMY LAPAROSCOPIC;  Surgeon: Tanna Mclain MD;  Location: [facility] OR;  Service: General;  Laterality: N/A;   • COLONOSCOPY N/A 2021    Procedure: COLONOSCOPY WITH ANESTHESIA;  Surgeon: Femi Goddard MD;  Location: [facility] ENDOSCOPY;  Service: Gastroenterology;  Laterality: N/A;  Pre: Screening  Post: Suboptimal Prep  Dr. Jovi Arita  CO2 Inflation Used   • ENDOSCOPY N/A 10/20/2017    Food found in the esophagus removal was successful otherwise normal exam   • HERNIA REPAIR     • TONSILLECTOMY         Family History:  Family History   Problem Relation Age of Onset   • Cancer Mother    • Cancer Father    • Breast cancer Maternal Aunt    • Colon cancer Neg Hx    • Colon polyps Neg Hx        Social History:   reports that she has been smoking cigarettes. She has a 40.00 pack-year smoking history. She has never used smokeless tobacco. She reports that she does not drink alcohol and does not use drugs.    Medications:   Prior to Admission medications    Medication Sig Start Date End Date Taking? Authorizing Provider   albuterol sulfate  (90 Base) MCG/ACT inhaler Inhale 2 puffs by mouth Every 4 (Four) Hours As Needed for Wheezing. 19  Yes Ebony Chen PA   budesonide-formoterol (SYMBICORT) 160-4.5 MCG/ACT inhaler Inhale 2 puffs 2 (Two) Times a Day. 19  Yes Ebony Chen PA   buprenorphine-naloxone  "(SUBOXONE) 8-2 MG per SL tablet Place 1.5 tablets under the tongue Daily.   Yes ProviderConsuelo MD   doxycycline (VIBRAMYCIN) 100 MG capsule Take 100 mg by mouth 2 (Two) Times a Day.   Yes Consuelo Glover MD   fluticasone (FLONASE) 50 MCG/ACT nasal spray SPRAY 1-2 SPRAYS EACH NOSTRIL ONCE A DAY 3/3/21  Yes Consuelo Glover MD   levothyroxine (SYNTHROID, LEVOTHROID) 12.5 MCG half tablet Take 12.5 mcg by mouth Daily.   Yes Consuelo Glover MD   PredniSONE 5 MG tablet therapy pack dosepak Take 1 each by mouth. Take as directed on package instructions.   Yes Consuelo Glover MD   polyethylene glycol (GoLYTELY) 236 g solution As Directed by office. May add flavor packet 7/9/21 8/24/21  Femi Goddard MD       Allergies:  Patient has no known allergies.    Objective     Blood pressure 130/69, pulse 75, temperature 97.6 °F (36.4 °C), temperature source Temporal, resp. rate 22, height 165.1 cm (65\"), weight 51.3 kg (113 lb), SpO2 95 %, not currently breastfeeding.    Physical Exam   Constitutional: Pt is oriented to person, place, and in no distress.   HENT: Mouth/Throat: Oropharynx is clear.   Cardiovascular: Normal rate, regular rhythm.    Pulmonary/Chest: Effort normal. No respiratory distress. No  wheezes.   Abdominal: Soft. Non-distended.  Skin: Skin is warm and dry.   Psychiatric: Mood, memory, affect and judgment appear normal.     Assessment/Plan     Diagnosis:  History of polyps    Anticipated Surgical Procedure:    Proceed with colonoscopy as scheduled    The following major R/B/A were discussed with the patient, however the list is not all inclusive . Risk:  Bleeding (immediate and delayed), perforation (rupture or tear), reaction to medication, missed lesion/cancer, pain during the procedure, infection, need for surgery, need for ostomy, need for mechanical ventilation (breathing machine), death.  Benefits: removal of polyp/tissue, burn/clip/or inject to stop bleeding, removal of " foreign body, dilate any stricture.  Alternatives: Xray or CT, surgery, do nothing with associated risk   The patient was given time to ask question and received explanation, and agrees to proceed as per History and Physical.   No guarantee given or expressed.    EMR Dragon/transcription disclaimer: Much of this encounter note is an electronic transcription/translation of spoken language to printed text.  The electronic translation of spoken language may permit erroneous, or at times, nonsensical words or phrases to be inadvertently transcribed.  Although I have reviewed the note for such errors, some may still exist.    Femi Goddard MD  12:18 CDT  8/24/2021

## 2021-08-24 NOTE — ANESTHESIA POSTPROCEDURE EVALUATION
"Patient: Tea PLUMMER Latrell    Procedure Summary     Date: 08/24/21 Room / Location: [facility] ENDOSCOPY 6 / [facility] ENDOSCOPY    Anesthesia Start: 1219 Anesthesia Stop: 1238    Procedure: COLONOSCOPY WITH ANESTHESIA (N/A ) Diagnosis:       Encounter for screening for malignant neoplasm of colon      (Encounter for screening for malignant neoplasm of colon [Z12.11])    Surgeons: Femi Goddard MD Provider: Jace Erazo CRNA    Anesthesia Type: MAC ASA Status: 2          Anesthesia Type: MAC    Vitals  No vitals data found for the desired time range.          Post Anesthesia Care and Evaluation    Patient location during evaluation: PHASE II  Patient participation: waiting for patient participation  Level of consciousness: responsive to light touch  Pain score: 0  Pain management: adequate  Airway patency: patent  Anesthetic complications: No anesthetic complications  PONV Status: none  Cardiovascular status: acceptable  Respiratory status: acceptable  Hydration status: acceptable    Comments: Blood pressure 130/69, pulse 75, temperature 97.6 °F (36.4 °C), temperature source Temporal, resp. rate 22, height 165.1 cm (65\"), weight 51.3 kg (113 lb), SpO2 95 %, not currently breastfeeding.    Pt discharged from PACU based on pamela score >8      "

## 2021-08-24 NOTE — ANESTHESIA PREPROCEDURE EVALUATION
Anesthesia Evaluation     Patient summary reviewed   no history of anesthetic complications:  NPO Solid Status: > 8 hours  NPO Liquid Status: > 8 hours           Airway   Mallampati: III  TM distance: >3 FB  Neck ROM: full  Dental    (+) poor dentition        Pulmonary    (+) a smoker (1 ppd) Current Abstained day of surgery, COPD,   (-) asthma, recent URI, sleep apnea, no home oxygen  Cardiovascular   Exercise tolerance: good (4-7 METS)    ECG reviewed    (-) pacemaker, hypertension, past MI, angina, cardiac stents, CABG      Neuro/Psych  (-) seizures, TIA, CVA  GI/Hepatic/Renal/Endo    (+)  GERD,  thyroid problem hypothyroidism  (-) liver disease, no renal disease, diabetes    Musculoskeletal     Abdominal    Substance History      OB/GYN          Other                          Anesthesia Plan    ASA 2     MAC     intravenous induction     Anesthetic plan, all risks, benefits, and alternatives have been provided, discussed and informed consent has been obtained with: patient.

## 2021-08-29 LAB
LAB AP CASE REPORT: NORMAL
PATH REPORT.FINAL DX SPEC: NORMAL
PATH REPORT.GROSS SPEC: NORMAL

## 2022-02-11 NOTE — PROGRESS NOTES
"Infectious Diseases Progress Note    Patient:  Tea Sams  YOB: 1955  MRN: [id number]   Admit date: 11/3/2018   Admitting Physician: Matias Snell MD  Primary Care Physician: Provider, No Known    Chief Complaint/Interval History: She seems to be continuing to feel better.  She is having no fever.  Breathing more easily.  No hemoptysis.  Minimal production to cough.  No nausea, diarrhea, or rash on antibiotic treatment.  She is tolerating oral antimicrobial therapy.    Intake/Output Summary (Last 24 hours) at 11/08/18 0808  Last data filed at 11/07/18 1844   Gross per 24 hour   Intake              240 ml   Output                0 ml   Net              240 ml     Allergies: No Known Allergies  Current Scheduled Medications:     azithromycin 500 mg Oral Q24H   budesonide-formoterol 2 puff Inhalation BID - RT   cefdinir 300 mg Oral Q12H   enoxaparin 40 mg Subcutaneous Q24H   famotidine 20 mg Oral BID   fluticasone 2 spray Each Nare Daily   ipratropium-albuterol 3 mL Nebulization Q4H - RT   polyethylene glycol 17 g Oral Daily   predniSONE 40 mg Oral Daily With Breakfast   sodium chloride 3 mL Intravenous Q12H     Current PRN Medications:  •  acetaminophen  •  influenza vaccine  •  meloxicam  •  Pharmacy Consult - Pharmacy to dose  •  pneumococcal polysaccharide 23-valent  •  sodium chloride  •  sodium chloride    Review of Systems    Vital Signs:  /81   Pulse 84   Temp 97.8 °F (36.6 °C)   Resp 20   Ht 165.1 cm (65\")   Wt 51.8 kg (114 lb 3.2 oz)   LMP  (Within Years)   SpO2 98%   BMI 19.00 kg/m²     Physical Exam  Vital signs reviewed.  Line/IV site: No erythema, warmth, induration, or tenderness.    Lab Results:  CBC:   Results from last 7 days  Lab Units 11/08/18  0707 11/06/18  0032 11/05/18  0548 11/04/18  0311 11/03/18  1941   WBC 10*3/mm3 25.71* 29.89* 23.10* 15.65* 19.33*   HEMOGLOBIN g/dL 12.2 9.9* 11.0* 10.9* 12.7   HEMATOCRIT % 36.3* 29.3* 32.9* 31.7* 37.8   PLATELETS " 10*3/mm3 731* 535* 559* 493* 551*     BMP:  Results from last 7 days  Lab Units 11/08/18  0707 11/06/18  0032 11/05/18  0548 11/04/18  0311 11/03/18  1941   SODIUM mmol/L 138 140 143 141 139   POTASSIUM mmol/L 4.6 4.1 4.3 3.5 4.0   CHLORIDE mmol/L 98 102 103 105 98   CO2 mmol/L 27.0 27.0 30.0 22.0* 26.0   BUN mg/dL 20 23* 14 7 10   CREATININE mg/dL 0.51 0.53 0.58 0.47* 0.59   GLUCOSE mg/dL 137* 219* 197* 135* 144*   CALCIUM mg/dL 9.4 9.4 9.1 7.6* 9.4   ALT (SGPT) U/L 98* 118* 109*  --  187*     Culture Results:   Blood Culture   Date Value Ref Range Status   11/03/2018 No growth at 4 days  Preliminary   11/03/2018 No growth at 4 days  Preliminary     Respiratory Culture   Date Value Ref Range Status   11/04/2018 Light growth (2+) Normal Respiratory Jennifer  Final     2 sputum AFB smears negative  T spot negative  Urine Legionella antigen negative  Urine pneumococcal antigen negative  Respiratory or a panel PCR-negative  Urine blastomyces antigen pending  Urine histoplasma antigen negative  HIV serology negative    Radiology:   Chest x-ray yesterday (personally reviewed):  IMPRESSION:  1. No radiographic evidence of acute cardiopulmonary process.  This report was finalized on 11/07/2018 11:14 by Dr. Obed Villavicencio MD.    Additional Studies Reviewed: None    Impression:   Diffuse nodular lung opacities, lingular consolidation, mediastinal and hilar adenopathy-chest x-ray from yesterday had shown significant improvement.  Improvement would suggest some of her prior interstitial infiltrate may have been edema related.  She definitely seems to have responded to her treatment which is included steroids, inhalers, and antibiotic therapy.  Workup for infectious etiology to date has not confirmed a definite diagnosis.  Mediastinal/hilar adenopathy persists she continues to have somewhat of a miliary pattern on x-ray feel biopsy would be indicated.  Per discussion with hospitalist service and review pulmonary notes, possible  repeat CT scan in 2-3 weeks with biopsy if no improvement.  Feel TB unlikely given negative T spot and 2 negative sputum AFB smears.  She should have a third smear sent this morning.  Blastomycosis remains in the differential diagnosis and urine Blastomyces antigen remains pending.  Sarcoid would be a consideration as well.  This small nodular miliary pattern persists with adenopathy, tumor would remain a possibility in addition.    Recommendations:   She appears to be stable for release home if ready for discharge per others  Would suggest Omnicef and azithromycin for 1 more week  Agree with plans for follow-up CT scan in 2-3 weeks with consideration for biopsy if abnormalities persist or are not shown improvement  We will offer follow-up in one month  Discussed with Dr. Snell with hospitalist service    De Altman MD   Detail Level: Detailed

## 2022-09-02 ENCOUNTER — TRANSCRIBE ORDERS (OUTPATIENT)
Dept: ADMINISTRATIVE | Facility: HOSPITAL | Age: 67
End: 2022-09-02

## 2022-09-02 DIAGNOSIS — Z92.89 HISTORY OF MAMMOGRAPHY, SCREENING: ICD-10-CM

## 2022-09-02 DIAGNOSIS — F17.210 CIGARETTE NICOTINE DEPENDENCE, UNCOMPLICATED: Primary | ICD-10-CM

## 2022-09-05 ENCOUNTER — HOSPITAL ENCOUNTER (OUTPATIENT)
Dept: GENERAL RADIOLOGY | Facility: HOSPITAL | Age: 67
Discharge: HOME | End: 2022-09-05
Admitting: FAMILY MEDICINE

## 2022-09-05 PROCEDURE — 73650 X-RAY EXAM OF HEEL: CPT

## 2022-09-08 ENCOUNTER — TELEPHONE (OUTPATIENT)
Dept: PODIATRY | Facility: CLINIC | Age: 67
End: 2022-09-08

## 2022-09-08 NOTE — PROGRESS NOTES
[facility] - PODIATRY    Today's Date: 2022     Patient Name: Tea Sams  MRN: [id number]  CSN: [id number]  PCP: Jovi Arita Jr., MD  Referring Provider: Jovi Brooks MD    SUBJECTIVE     Chief Complaint   Patient presents with   • Establish Care     Jovi Arita Jr., MD PT WILL BE HERE AT 2/POSSIBLE FRACTURE/REFERRING SINGH - pt states had accident on left foot and is in extreme pain, happened Friday night before labor day  - pt pain 7/10 - pt presents left foot heel pain, had xrays done      HPI: Tea Sams, a 67 y.o.female, comes to clinic as a(n) new patient complaining of left foot trauma. Patient has h/o Arthritis, COPD, GERD, Tobacco Use. States that 1 week ago she stepped off a scooter and injured her left heel. Noted bruising and pain at the time of the injury. It is painful for her to put pressure to her heel. Had xrays taken concerning for heel fracture. Admits pain at 7/10 level and described as aching and throbbing. Relates previous treatment(s) including crutches. Denies any constitutional symptoms. No other pedal complaints at this time.    Past Medical History:   Diagnosis Date   • Arthritis    • COPD (chronic obstructive pulmonary disease) (HCC)    • GERD (gastroesophageal reflux disease)      Past Surgical History:   Procedure Laterality Date   • ABDOMINAL SURGERY     •  SECTION     • CHOLECYSTECTOMY WITH INTRAOPERATIVE CHOLANGIOGRAM N/A 7/15/2020    Procedure: CHOLECYSTECTOMY LAPAROSCOPIC;  Surgeon: Tanna Mclain MD;  Location: [facility] OR;  Service: General;  Laterality: N/A;   • COLONOSCOPY N/A 2021    Procedure: COLONOSCOPY WITH ANESTHESIA;  Surgeon: Femi Goddard MD;  Location: [facility] ENDOSCOPY;  Service: Gastroenterology;  Laterality: N/A;  Pre: Screening  Post: Suboptimal Prep  Dr. Jovi Arita  CO2 Inflation Used   • COLONOSCOPY N/A 2021    Procedure: COLONOSCOPY WITH ANESTHESIA;  Surgeon: Femi Goddard MD;   Location: [facility] ENDOSCOPY;  Service: Gastroenterology;  Laterality: N/A;  pre screen  post polyps   Dr. Jovi Arita   • ENDOSCOPY N/A 10/20/2017    Food found in the esophagus removal was successful otherwise normal exam   • HERNIA REPAIR     • TONSILLECTOMY       Family History   Problem Relation Age of Onset   • Cancer Mother    • Cancer Father    • Breast cancer Maternal Aunt    • Colon cancer Neg Hx    • Colon polyps Neg Hx      Social History     Socioeconomic History   • Marital status: Single   Tobacco Use   • Smoking status: Current Every Day Smoker     Packs/day: 1.00     Years: 40.00     Pack years: 40.00     Types: Cigarettes   • Smokeless tobacco: Never Used   Vaping Use   • Vaping Use: Never used   Substance and Sexual Activity   • Alcohol use: No   • Drug use: No   • Sexual activity: Defer     Comment: recieved partient from the ED dept . No history with chart      No Known Allergies  Current Outpatient Medications   Medication Sig Dispense Refill   • albuterol sulfate  (90 Base) MCG/ACT inhaler Inhale 2 puffs by mouth Every 4 (Four) Hours As Needed for Wheezing. 8.5 g 0   • budesonide-formoterol (SYMBICORT) 160-4.5 MCG/ACT inhaler Inhale 2 puffs 2 (Two) Times a Day. 1 inhaler 0   • buprenorphine-naloxone (SUBOXONE) 8-2 MG per SL tablet Place 1.5 tablets under the tongue Daily.     • fluticasone (FLONASE) 50 MCG/ACT nasal spray SPRAY 1-2 SPRAYS EACH NOSTRIL ONCE A DAY     • levothyroxine (SYNTHROID, LEVOTHROID) 12.5 MCG half tablet Take 12.5 mcg by mouth Daily.       No current facility-administered medications for this visit.     Review of Systems   Constitutional: Negative for chills and fever.   HENT: Negative for congestion.    Respiratory: Negative for shortness of breath.    Cardiovascular: Negative for chest pain and leg swelling.   Gastrointestinal: Negative for constipation, diarrhea, nausea and vomiting.   Musculoskeletal: Positive for arthralgias and gait problem.        Foot  pain   Skin: Negative for wound.   Neurological: Negative for numbness.       OBJECTIVE     Vitals:    09/09/22 1400   BP: 115/70       PHYSICAL EXAM  GEN:   Accompanied by none.     Foot/Ankle Exam:       General:   Appearance: appears stated age and healthy    Orientation: AAOx3    Affect: appropriate    Assistance: crutches and wheelchair      VASCULAR      Right Foot Vascularity   Dorsalis pedis:  2+  Posterior tibial:  2+  Skin Temperature: warm    Edema Grading:  None  CFT:  3  Pedal Hair Growth:  Present  Varicosities: spider veins       Left Foot Vascularity   Dorsalis pedis:  2+  Posterior tibial:  2+  Skin Temperature: warm    Edema Grading:  None  CFT:  3  Pedal Hair Growth:  Present  Varicosities: spider veins        NEUROLOGIC     Right Foot Neurologic   Normal sensation    Light touch sensation:  Normal  Vibratory sensation:  Normal  Hot/Cold sensation: normal    Protective Sensation using [employer]ii Monofilament:  10     Left Foot Neurologic   Normal sensation    Light touch sensation:  Normal  Vibratory sensation:  Normal  Hot/cold sensation: normal    Protective Sensation using Canova-Nii Monofilament:  10     MUSCULOSKELETAL      Right Foot Musculoskeletal   Ecchymosis:  None  Tenderness: none    Arch:  Normal     Left Foot Musculoskeletal   Ecchymosis:  None  Tenderness: calcaneus tenderness and plantar heel    Arch:  Normal     MUSCLE STRENGTH     Right Foot Muscle Strength   Foot dorsiflexion:  5  Foot plantar flexion:  5  Foot inversion:  5  Foot eversion:  5     Left Foot Muscle Strength   Foot dorsiflexion:  5  Foot plantar flexion:  5  Foot inversion:  5  Foot eversion:  5     RANGE OF MOTION      Right Foot Range of Motion   Foot and ankle ROM within normal limits       Left Foot Range of Motion   Foot and ankle ROM within normal limits       DERMATOLOGIC     Right Foot Dermatologic   Skin: skin intact and atrophic       Left Foot Dermatologic   Skin: skin intact and atrophic        TESTS     Left Foot Tests   Calcaneal squeeze: positive        RADIOLOGY/NUCLEAR:  XR Calcaneus 2+ View Left    Result Date: 9/5/2022  Narrative: EXAM/TECHNIQUE: XR CALCANEUS 2+ VW LEFT-  INDICATION: left heel pain since fall 3 days ago.; M79.672-Pain in left foot  COMPARISON: None available.  FINDINGS:  Small cortical step-off along the posterior plantar aspect of the calcaneus, suspicious for acute minimally displaced fracture. No other suspicious osseous findings. Subtalar joint appears intact. Visualized portion of the ankle joint is unremarkable. No radiopaque foreign body or soft tissue gas.      Impression:  Cortical step-off along the posterior plantar aspect of the calcaneus, suspicious for acute minimally displaced fracture. Correlate with clinical exam and history. CT could be obtained for more definitive evaluation if clinically indicated. This report was finalized on 09/05/2022 10:53 by Dr. Suhail Lr MD.      LABORATORY/CULTURE RESULTS:      PATHOLOGY RESULTS:       ASSESSMENT/PLAN     Diagnoses and all orders for this visit:    1. Open nondisplaced fracture of body of left calcaneus, initial encounter (Primary)  -     CT FOOT LEFT WITHOUT CONTRAST; Future    2. Tobacco abuse      Comprehensive lower extremity examination and evaluation was performed.  Discussed findings and treatment plan including risks, benefits, and treatment options with patient in detail. Patient agreed with treatment plan.  Reviewed xrays.  Concerning for calcaneus fracture. Will order CT for further evaluation.    Will remain conservative with immobilization in CAM, rest, icing.   Utilize crutches for NWB  Discussed tobacco cessation and effects on bone healing.   An After Visit Summary was printed and given to the patient at discharge, including (if requested) any available informative/educational handouts regarding diagnosis, treatment, or medications. All questions were answered to patient/family satisfaction.  Should symptoms fail to improve or worsen they agree to call or return to clinic or to go to the Emergency Department. Discussed the importance of following up with any needed screening tests/labs/specialist appointments and any requested follow-up recommended by me today. Importance of maintaining follow-up discussed and patient accepts that missed appointments can delay diagnosis and potentially lead to worsening of conditions.  Return in about 6 weeks (around 10/21/2022) for Recheck, Follow-up with Dr. Cote., or sooner if acute issues arise.    Lab Frequency Next Occurrence   Follow Anesthesia Guidelines / Standing Orders Once 03/24/2021   Obtain Informed Consent Once 03/29/2021   Follow Anesthesia Guidelines / Standing Orders Once 01/05/2022   Obtain Informed Consent Once 01/05/2022   Mammo Screening Digital Tomosynthesis Bilateral With CAD Once 09/07/2022   CT Chest Low Dose Wo Once 09/07/2022       This document has been electronically signed by Etienne Cote DPM on September 9, 2022 14:20 CDT

## 2022-09-09 ENCOUNTER — OFFICE VISIT (OUTPATIENT)
Dept: PODIATRY | Facility: CLINIC | Age: 67
End: 2022-09-09

## 2022-09-09 VITALS
BODY MASS INDEX: 19.16 KG/M2 | WEIGHT: 115 LBS | HEIGHT: 65 IN | SYSTOLIC BLOOD PRESSURE: 115 MMHG | DIASTOLIC BLOOD PRESSURE: 70 MMHG

## 2022-09-09 DIAGNOSIS — S92.015B: Primary | ICD-10-CM

## 2022-09-09 DIAGNOSIS — Z72.0 TOBACCO ABUSE: ICD-10-CM

## 2022-09-09 PROCEDURE — 99203 OFFICE O/P NEW LOW 30 MIN: CPT | Performed by: PODIATRIST

## 2022-09-14 ENCOUNTER — HOSPITAL ENCOUNTER (OUTPATIENT)
Dept: CT IMAGING | Facility: HOSPITAL | Age: 67
End: 2022-09-14

## 2022-09-14 ENCOUNTER — APPOINTMENT (OUTPATIENT)
Dept: MAMMOGRAPHY | Facility: HOSPITAL | Age: 67
End: 2022-09-14

## 2022-09-15 ENCOUNTER — HOSPITAL ENCOUNTER (OUTPATIENT)
Dept: CT IMAGING | Facility: HOSPITAL | Age: 67
Discharge: HOME | End: 2022-09-15
Admitting: PODIATRIST

## 2022-09-15 DIAGNOSIS — S92.015B: ICD-10-CM

## 2022-09-15 PROCEDURE — 73700 CT LOWER EXTREMITY W/O DYE: CPT

## 2022-10-18 ENCOUNTER — HOSPITAL ENCOUNTER (OUTPATIENT)
Dept: MAMMOGRAPHY | Facility: HOSPITAL | Age: 67
Discharge: HOME | End: 2022-10-18

## 2022-10-18 ENCOUNTER — HOSPITAL ENCOUNTER (OUTPATIENT)
Dept: CT IMAGING | Facility: HOSPITAL | Age: 67
Discharge: HOME | End: 2022-10-18

## 2022-10-18 DIAGNOSIS — Z92.89 HISTORY OF MAMMOGRAPHY, SCREENING: ICD-10-CM

## 2022-10-18 DIAGNOSIS — F17.210 CIGARETTE NICOTINE DEPENDENCE, UNCOMPLICATED: ICD-10-CM

## 2022-10-18 PROCEDURE — 77067 SCR MAMMO BI INCL CAD: CPT

## 2022-10-18 PROCEDURE — 71271 CT THORAX LUNG CANCER SCR C-: CPT

## 2022-10-18 PROCEDURE — 77063 BREAST TOMOSYNTHESIS BI: CPT

## 2022-10-20 NOTE — PROGRESS NOTES
[facility] - PODIATRY    Today's Date: 10/21/2022     Patient Name: Tea Sams  MRN: [id number]  CSN: [id number]  PCP: Jovi Arita Jr., MD  Referring Provider: No ref. provider found    SUBJECTIVE     Chief Complaint   Patient presents with   • Follow-up     Jovi Arita Jr., MD 6 week fu for recheck- pt states has been imobile pretty much so not much pain or anything- pt denies pain, more achy at times- pt presents on crutches, walking boot left foot, no visible issues     HPI: Tea Sams, a 67 y.o.female, comes to clinic as a(n) established patient for follow-up treatment of left calcaneal fracture. Patient has h/o Arthritis, COPD, GERD, Tobacco Use. Since last visit she has remained NWB with crutches and CAM boot. Notes improvement to her foot/heal. Denies pain. Relates previous treatment(s) including crutches. Denies any constitutional symptoms. No other pedal complaints at this time.    Past Medical History:   Diagnosis Date   • Arthritis    • COPD (chronic obstructive pulmonary disease) (HCC)    • GERD (gastroesophageal reflux disease)      Past Surgical History:   Procedure Laterality Date   • ABDOMINAL SURGERY     •  SECTION     • CHOLECYSTECTOMY WITH INTRAOPERATIVE CHOLANGIOGRAM N/A 7/15/2020    Procedure: CHOLECYSTECTOMY LAPAROSCOPIC;  Surgeon: Tanna Mclain MD;  Location: [facility] OR;  Service: General;  Laterality: N/A;   • COLONOSCOPY N/A 2021    Procedure: COLONOSCOPY WITH ANESTHESIA;  Surgeon: Femi Goddard MD;  Location: [facility] ENDOSCOPY;  Service: Gastroenterology;  Laterality: N/A;  Pre: Screening  Post: Suboptimal Prep  Dr. Jovi Arita  CO2 Inflation Used   • COLONOSCOPY N/A 2021    Procedure: COLONOSCOPY WITH ANESTHESIA;  Surgeon: Femi Goddard MD;  Location: [facility] ENDOSCOPY;  Service: Gastroenterology;  Laterality: N/A;  pre screen  post polyps   Dr. Jovi Arita   • ENDOSCOPY N/A 10/20/2017    Food found in the esophagus  removal was successful otherwise normal exam   • HERNIA REPAIR     • TONSILLECTOMY       Family History   Problem Relation Age of Onset   • Cancer Mother    • Cancer Father    • Breast cancer Maternal Aunt    • Colon cancer Neg Hx    • Colon polyps Neg Hx      Social History     Socioeconomic History   • Marital status: Single   Tobacco Use   • Smoking status: Every Day     Packs/day: 1.00     Years: 40.00     Pack years: 40.00     Types: Cigarettes   • Smokeless tobacco: Never   Vaping Use   • Vaping Use: Never used   Substance and Sexual Activity   • Alcohol use: No   • Drug use: No   • Sexual activity: Defer     Comment: recieved partient from the ED dept . No history with chart      No Known Allergies  Current Outpatient Medications   Medication Sig Dispense Refill   • albuterol sulfate  (90 Base) MCG/ACT inhaler Inhale 2 puffs by mouth Every 4 (Four) Hours As Needed for Wheezing. 8.5 g 0   • budesonide-formoterol (SYMBICORT) 160-4.5 MCG/ACT inhaler Inhale 2 puffs 2 (Two) Times a Day. 1 inhaler 0   • buprenorphine-naloxone (SUBOXONE) 8-2 MG per SL tablet Place 1.5 tablets under the tongue Daily.     • fluticasone (FLONASE) 50 MCG/ACT nasal spray SPRAY 1-2 SPRAYS EACH NOSTRIL ONCE A DAY     • levothyroxine (SYNTHROID, LEVOTHROID) 12.5 MCG half tablet Take 12.5 mcg by mouth Daily.       No current facility-administered medications for this visit.     Review of Systems   Constitutional: Negative for chills and fever.   HENT: Negative for congestion.    Respiratory: Negative for shortness of breath.    Cardiovascular: Negative for chest pain and leg swelling.   Gastrointestinal: Negative for constipation, diarrhea, nausea and vomiting.   Musculoskeletal: Positive for arthralgias and gait problem.        Foot pain   Skin: Negative for wound.   Neurological: Negative for numbness.       OBJECTIVE     Vitals:    10/21/22 0803   BP: 132/82   Pulse: 115   SpO2: 98%       PHYSICAL EXAM  GEN:   Accompanied by  none.     Foot/Ankle Exam:       General:   Appearance: appears stated age and healthy    Orientation: AAOx3    Affect: appropriate    Assistance: crutches    Shoe Gear:  CAM boot    VASCULAR      Right Foot Vascularity   Dorsalis pedis:  2+  Posterior tibial:  2+  Skin Temperature: warm    Edema Grading:  None  CFT:  3  Pedal Hair Growth:  Present  Varicosities: spider veins       Left Foot Vascularity   Dorsalis pedis:  2+  Posterior tibial:  2+  Skin Temperature: warm    Edema Grading:  None  CFT:  3  Pedal Hair Growth:  Present  Varicosities: spider veins        NEUROLOGIC     Right Foot Neurologic   Normal sensation    Light touch sensation:  Normal  Vibratory sensation:  Normal  Hot/Cold sensation: normal    Protective Sensation using [place]ii Monofilament:  10     Left Foot Neurologic   Normal sensation    Light touch sensation:  Normal  Vibratory sensation:  Normal  Hot/cold sensation: normal    Protective Sensation using [place]ii Monofilament:  10     MUSCULOSKELETAL      Right Foot Musculoskeletal   Ecchymosis:  None  Tenderness: none    Arch:  Normal     Left Foot Musculoskeletal   Ecchymosis:  None  Tenderness: calcaneus tenderness and plantar heel    Tenderness comment:  Minimal at worst  Arch:  Normal     MUSCLE STRENGTH     Right Foot Muscle Strength   Foot dorsiflexion:  5  Foot plantar flexion:  5  Foot inversion:  5  Foot eversion:  5     Left Foot Muscle Strength   Foot dorsiflexion:  5  Foot plantar flexion:  5  Foot inversion:  5  Foot eversion:  5     RANGE OF MOTION      Right Foot Range of Motion   Foot and ankle ROM within normal limits       Left Foot Range of Motion   Foot and ankle ROM within normal limits       DERMATOLOGIC     Right Foot Dermatologic   Skin: skin intact and atrophic       Left Foot Dermatologic   Skin: skin intact and atrophic       TESTS     Left Foot Tests   Calcaneal squeeze: negative        RADIOLOGY/NUCLEAR:  CT Chest Low Dose Cancer Screening  WO    Result Date: 10/18/2022  Narrative: EXAM/TECHNIQUE: CT chest without contrast, low-dose protocol  INDICATION: f17.210; F17.210-Nicotine dependence, cigarettes, uncomplicated  COMPARISON: 2/10/2021  DLP: 26.9 mGy cm. Automated exposure control was also utilized to decrease patient radiation dose.  FINDINGS:  Areas of mild mucus impaction the LEFT upper lobe and RIGHT lower lobe are noted. Chronic lingular peribronchial consolidation and nodularity. There is also chronic focus of tree-in-bud nodularity in faint groundglass in the medial RIGHT middle lobe. No new area of consolidation. No pleural effusion. Mild centrilobular emphysema.  Unchanged 3 mm RIGHT upper lobe pulmonary nodule, image 76. Unchanged 4 mm RIGHT lower lobe pulmonary nodule, image 93. No new or enlarging pulmonary nodule.  No new or enlarging thoracic lymph nodes. Main pulmonary artery is nondilated. Thoracic aorta is nonaneurysmal. Heavy coronary atherosclerotic calcification. No pericardial effusion.  No acute chest wall soft tissue abnormality. Prior cholecystectomy. No acute finding in the partially imaged upper abdomen. Moderate multilevel thoracic spine degenerative change.      Impression:  1.  No new or enlarging pulmonary nodule.  2.  Chronic areas of peribronchial consolidation and tree-in-bud nodularity in the lingula and RIGHT middle lobe. This likely represents an indolent infectious or inflammatory process.  Lung-RADS 2: Benign Nodules with a very low likelihood of becoming a clinically active cancer due to size or lack of growth. Continue annual screening with low dose CT in 12 months. This report was finalized on 10/18/2022 10:21 by Dr. Suhail Lr MD.     Mammo Screening Digital Tomosynthesis Bilateral With CAD    Result Date: 10/18/2022  Narrative: EXAMINATION:  MAMMO SCREENING DIGITAL TOMOSYNTHESIS BILATERAL W CAD- 10/18/2022 9:28 AM CDT  CLINICAL HISTORY: Z92.89-Personal history of other medical treatment. Screening  mammogram.  FAMILY HISTORY OF BREAST CANCER: Maternal aunt.  COMPARISON STUDIES: 02/10/2021.  BREAST DENSITY: There are scattered fibroglandular densities that may lower the sensitivity of mammography (Pattern B).  TECHNIQUE: Digital mammography was performed. 3-D Tomosynthesis was performed. Standard images supplemented with an exaggerated right CC view.  FINDINGS: There is a 7 mm left breast nodule at 6:00 versus a skin lesion. This previously measures about 5 mm. It appears well circumscribed. There are no other dominant masses. There are benign calcifications on the right. There are no suspicious clusters. There is no skin thickening.      Impression: 1. Increasing size of a small left breast nodule versus a skin lesion. Ultrasound recommended if not a skin lesion or multiple. BI-RADS 0. 2. Stable right mammogram. 3. Computer aided detection was utilized. 3-D Tomosynthesis was performed.  BI-RADS CATEGORY  0:  Needs additional imaging evaluation or needs                                               comparison to a previous exam. BI-RADS CATEGORY  1:  Negative. BI-RADS CATEGORY  2:  Benign. BI-RADS CATEGORY  3:  Probably benign finding-Short interval followup                                               recommended. BI-RADS CATEGORY  4:  Suspicious abnormality-Consider biopsy.                                        4a:  Low suspicion of malignancy.                                        4b:  Intermediate suspicion of malignancy.                                        4c:  Moderate suspicion of malignancy, but not classic                                                for malignancy. BI-RADS CATEGORY  5:  Highly suggestive of malignancy-Take appropriate                                               action. BI-RADS CATEGORY  6:  Known biopsy proven breast malignancy. This report was finalized on 10/18/2022 09:55 by Dr. Hussain Fisher MD.       LABORATORY/CULTURE RESULTS:      PATHOLOGY RESULTS:        ASSESSMENT/PLAN     Diagnoses and all orders for this visit:    1. Open nondisplaced fracture of body of left calcaneus with routine healing, subsequent encounter (Primary)    2. Tobacco abuse      Comprehensive lower extremity examination and evaluation was performed.  Discussed findings and treatment plan including risks, benefits, and treatment options with patient in detail. Patient agreed with treatment plan.  Reviewed CT.   Clinical patient is healing well.   Transition to FWB in CAM for 1 month.    If in 1 month, no pain, will transition back to regular shoes.   Tobacco cessation needed.   An After Visit Summary was printed and given to the patient at discharge, including (if requested) any available informative/educational handouts regarding diagnosis, treatment, or medications. All questions were answered to patient/family satisfaction. Should symptoms fail to improve or worsen they agree to call or return to clinic or to go to the Emergency Department. Discussed the importance of following up with any needed screening tests/labs/specialist appointments and any requested follow-up recommended by me today. Importance of maintaining follow-up discussed and patient accepts that missed appointments can delay diagnosis and potentially lead to worsening of conditions.  Return in about 1 month (around 11/21/2022) for Recheck, Follow-up with Podiatry Provider., or sooner if acute issues arise.    Lab Frequency Next Occurrence   Follow Anesthesia Guidelines / Standing Orders Once 03/24/2021   Obtain Informed Consent Once 03/29/2021   Follow Anesthesia Guidelines / Standing Orders Once 01/05/2022   Obtain Informed Consent Once 01/05/2022   Mammo Screening Digital Tomosynthesis Bilateral With CAD Once 09/07/2022   CT Chest Low Dose Wo Once 09/07/2022       This document has been electronically signed by Etienne Cote DPM on October 21, 2022 08:14 CDT

## 2022-10-21 ENCOUNTER — OFFICE VISIT (OUTPATIENT)
Dept: PODIATRY | Facility: CLINIC | Age: 67
End: 2022-10-21

## 2022-10-21 VITALS
WEIGHT: 115 LBS | HEIGHT: 65 IN | SYSTOLIC BLOOD PRESSURE: 132 MMHG | OXYGEN SATURATION: 98 % | DIASTOLIC BLOOD PRESSURE: 82 MMHG | BODY MASS INDEX: 19.16 KG/M2 | HEART RATE: 115 BPM

## 2022-10-21 DIAGNOSIS — S92.015D OPEN NONDISPLACED FRACTURE OF BODY OF LEFT CALCANEUS WITH ROUTINE HEALING, SUBSEQUENT ENCOUNTER: Primary | ICD-10-CM

## 2022-10-21 DIAGNOSIS — Z72.0 TOBACCO ABUSE: ICD-10-CM

## 2022-10-21 PROCEDURE — 99213 OFFICE O/P EST LOW 20 MIN: CPT | Performed by: PODIATRIST

## 2022-11-17 NOTE — PROGRESS NOTES
[facility] - PODIATRY    Today's Date: 2022     Patient Name: Tea Sams  MRN: [id number]  CSN: [id number]  PCP: Jovi Arita Jr., MD  Referring Provider: No ref. provider found    SUBJECTIVE     Chief Complaint   Patient presents with   • Follow-up     Jovi Arita Jr., MD-2022-1 month for Recheck, Follow-up with Podiatry Provider Requested Rocael-pt states she fractured her heel and this is her last fu-pt denies pain-pt presents with no swelling or redness to the heel on L foot     HPI: Tea Sams, a 67 y.o.female, comes to clinic as a(n) established patient for follow-up treatment of left calcaneal fracture. Patient has h/o Arthritis, COPD, GERD, Tobacco Use. Patient states that her pain has essentially resolved. Notes that she walked out of her room today for the first time without boot and pain. Denies pain. Relates previous treatment(s) including crutches and CAM. Denies any constitutional symptoms. No other pedal complaints at this time.    Past Medical History:   Diagnosis Date   • Arthritis    • COPD (chronic obstructive pulmonary disease) (HCC)    • GERD (gastroesophageal reflux disease)      Past Surgical History:   Procedure Laterality Date   • ABDOMINAL SURGERY     •  SECTION     • CHOLECYSTECTOMY WITH INTRAOPERATIVE CHOLANGIOGRAM N/A 7/15/2020    Procedure: CHOLECYSTECTOMY LAPAROSCOPIC;  Surgeon: Tanna Mclain MD;  Location: [facility] OR;  Service: General;  Laterality: N/A;   • COLONOSCOPY N/A 2021    Procedure: COLONOSCOPY WITH ANESTHESIA;  Surgeon: Femi Goddard MD;  Location: [facility] ENDOSCOPY;  Service: Gastroenterology;  Laterality: N/A;  Pre: Screening  Post: Suboptimal Prep  Dr. Jovi Arita  CO2 Inflation Used   • COLONOSCOPY N/A 2021    Procedure: COLONOSCOPY WITH ANESTHESIA;  Surgeon: Femi Goddard MD;  Location: [facility] ENDOSCOPY;  Service: Gastroenterology;  Laterality: N/A;  pre screen  post polyps   Dr. Jovi Daugherty  Juan J   • ENDOSCOPY N/A 10/20/2017    Food found in the esophagus removal was successful otherwise normal exam   • HERNIA REPAIR     • TONSILLECTOMY       Family History   Problem Relation Age of Onset   • Cancer Mother    • Cancer Father    • Breast cancer Maternal Aunt    • Colon cancer Neg Hx    • Colon polyps Neg Hx      Social History     Socioeconomic History   • Marital status: Single   Tobacco Use   • Smoking status: Every Day     Packs/day: 1.00     Years: 40.00     Pack years: 40.00     Types: Cigarettes     Passive exposure: Current   • Smokeless tobacco: Never   Vaping Use   • Vaping Use: Never used   Substance and Sexual Activity   • Alcohol use: No   • Drug use: No   • Sexual activity: Defer     Comment: recieved partient from the ED dept . No history with chart      No Known Allergies  Current Outpatient Medications   Medication Sig Dispense Refill   • albuterol sulfate  (90 Base) MCG/ACT inhaler Inhale 2 puffs by mouth Every 4 (Four) Hours As Needed for Wheezing. 8.5 g 0   • budesonide-formoterol (SYMBICORT) 160-4.5 MCG/ACT inhaler Inhale 2 puffs 2 (Two) Times a Day. 1 inhaler 0   • buprenorphine-naloxone (SUBOXONE) 8-2 MG per SL tablet Place 1.5 tablets under the tongue Daily.     • fluticasone (FLONASE) 50 MCG/ACT nasal spray SPRAY 1-2 SPRAYS EACH NOSTRIL ONCE A DAY     • levothyroxine (SYNTHROID, LEVOTHROID) 12.5 MCG half tablet Take 12.5 mcg by mouth Daily.       No current facility-administered medications for this visit.     Review of Systems   Constitutional: Negative for chills and fever.   HENT: Negative for congestion.    Respiratory: Negative for shortness of breath.    Cardiovascular: Negative for chest pain and leg swelling.   Gastrointestinal: Negative for constipation, diarrhea, nausea and vomiting.   Musculoskeletal: Negative for arthralgias and gait problem.   Skin: Negative for wound.   Neurological: Negative for numbness.       OBJECTIVE     Vitals:    11/22/22 1433   BP:  120/70   Pulse: 111   SpO2: 97%       PHYSICAL EXAM  GEN:   Accompanied by none.     Foot/Ankle Exam:       General:   Appearance: appears stated age and healthy    Orientation: AAOx3    Affect: appropriate    Assistance: independent    Shoe Gear:  Casual shoes    VASCULAR      Right Foot Vascularity   Dorsalis pedis:  2+  Posterior tibial:  2+  Skin Temperature: warm    Edema Grading:  None  CFT:  3  Pedal Hair Growth:  Present  Varicosities: spider veins       Left Foot Vascularity   Dorsalis pedis:  2+  Posterior tibial:  2+  Skin Temperature: warm    Edema Grading:  None  CFT:  3  Pedal Hair Growth:  Present  Varicosities: spider veins        NEUROLOGIC     Right Foot Neurologic   Normal sensation    Light touch sensation:  Normal  Vibratory sensation:  Normal  Hot/Cold sensation: normal    Protective Sensation using [place]-Nii Monofilament:  10     Left Foot Neurologic   Normal sensation    Light touch sensation:  Normal  Vibratory sensation:  Normal  Hot/cold sensation: normal    Protective Sensation using [place]ii Monofilament:  10     MUSCULOSKELETAL      Right Foot Musculoskeletal   Ecchymosis:  None  Tenderness: none    Arch:  Normal     Left Foot Musculoskeletal   Ecchymosis:  None  Tenderness: none    Arch:  Normal     MUSCLE STRENGTH     Right Foot Muscle Strength   Foot dorsiflexion:  5  Foot plantar flexion:  5  Foot inversion:  5  Foot eversion:  5     Left Foot Muscle Strength   Foot dorsiflexion:  5  Foot plantar flexion:  5  Foot inversion:  5  Foot eversion:  5     RANGE OF MOTION      Right Foot Range of Motion   Foot and ankle ROM within normal limits       Left Foot Range of Motion   Foot and ankle ROM within normal limits       DERMATOLOGIC     Right Foot Dermatologic   Skin: skin intact and atrophic       Left Foot Dermatologic   Skin: skin intact and atrophic       TESTS     Left Foot Tests   Calcaneal squeeze: negative        RADIOLOGY/NUCLEAR:  No results  found.    LABORATORY/CULTURE RESULTS:      PATHOLOGY RESULTS:       ASSESSMENT/PLAN     Diagnoses and all orders for this visit:    1. Open nondisplaced fracture of body of left calcaneus with routine healing, subsequent encounter (Primary)    2. Tobacco abuse      Comprehensive lower extremity examination and evaluation was performed.  Discussed findings and treatment plan including risks, benefits, and treatment options with patient in detail. Patient agreed with treatment plan.  Patient is currently asymptomatic.   May return to regular shoes and activities to tolerance.    Tobacco cessation needed.   An After Visit Summary was printed and given to the patient at discharge, including (if requested) any available informative/educational handouts regarding diagnosis, treatment, or medications. All questions were answered to patient/family satisfaction. Should symptoms fail to improve or worsen they agree to call or return to clinic or to go to the Emergency Department. Discussed the importance of following up with any needed screening tests/labs/specialist appointments and any requested follow-up recommended by me today. Importance of maintaining follow-up discussed and patient accepts that missed appointments can delay diagnosis and potentially lead to worsening of conditions.  Return if symptoms worsen or fail to improve., or sooner if acute issues arise.    Lab Frequency Next Occurrence   Follow Anesthesia Guidelines / Standing Orders Once 03/24/2021   Obtain Informed Consent Once 03/29/2021   Follow Anesthesia Guidelines / Standing Orders Once 01/05/2022   Obtain Informed Consent Once 01/05/2022   Mammo Screening Digital Tomosynthesis Bilateral With CAD Once 09/07/2022   CT Chest Low Dose Wo Once 09/07/2022       This document has been electronically signed by Etienne Cote DPM on November 22, 2022 15:08 CST

## 2022-11-21 ENCOUNTER — TELEPHONE (OUTPATIENT)
Dept: PODIATRY | Facility: CLINIC | Age: 67
End: 2022-11-21

## 2022-11-22 ENCOUNTER — OFFICE VISIT (OUTPATIENT)
Dept: PODIATRY | Facility: CLINIC | Age: 67
End: 2022-11-22

## 2022-11-22 VITALS
HEART RATE: 111 BPM | DIASTOLIC BLOOD PRESSURE: 70 MMHG | HEIGHT: 65 IN | SYSTOLIC BLOOD PRESSURE: 120 MMHG | BODY MASS INDEX: 18.16 KG/M2 | OXYGEN SATURATION: 97 % | WEIGHT: 109 LBS

## 2022-11-22 DIAGNOSIS — S92.015D OPEN NONDISPLACED FRACTURE OF BODY OF LEFT CALCANEUS WITH ROUTINE HEALING, SUBSEQUENT ENCOUNTER: Primary | ICD-10-CM

## 2022-11-22 DIAGNOSIS — Z72.0 TOBACCO ABUSE: ICD-10-CM

## 2022-11-22 PROCEDURE — 99212 OFFICE O/P EST SF 10 MIN: CPT | Performed by: PODIATRIST

## 2023-11-28 ENCOUNTER — TRANSCRIBE ORDERS (OUTPATIENT)
Dept: ADMINISTRATIVE | Facility: HOSPITAL | Age: 68
End: 2023-11-28
Payer: MEDICARE

## 2023-11-28 DIAGNOSIS — Z12.31 ENCOUNTER FOR SCREENING MAMMOGRAM FOR MALIGNANT NEOPLASM OF BREAST: Primary | ICD-10-CM

## 2024-04-11 ENCOUNTER — HOSPITAL ENCOUNTER (OUTPATIENT)
Dept: CT IMAGING | Age: 69
Discharge: HOME | End: 2024-04-11
Payer: MEDICARE

## 2024-04-11 DIAGNOSIS — R91.1 SOLITARY PULMONARY NODULE: ICD-10-CM

## 2024-04-11 PROCEDURE — 71250 CT THORAX DX C-: CPT

## (undated) DEVICE — FRCP BIOP COLD ENDOJAW ALLGTR W/NDL 2.8X2300MM BLU

## (undated) DEVICE — ENDOGATOR AUXILIARY WATER JET CONNECTOR: Brand: ENDOGATOR

## (undated) DEVICE — GLV SURG BIOGEL M LTX PF 7 1/2

## (undated) DEVICE — PK TURNOVER RM ADV

## (undated) DEVICE — 3M™ STERI-STRIP™ REINFORCED ADHESIVE SKIN CLOSURES, R1546, 1/4 IN X 4 IN (6 MM X 100 MM), 10 STRIPS/ENVELOPE: Brand: 3M™ STERI-STRIP™

## (undated) DEVICE — THE CHANNEL CLEANING BRUSH IS A NYLON FLEXI BRUSH ATTACHED TO A FLEXIBLE PLASTIC SHEATH DESIGNED TO SAFELY REMOVE DEBRIS FROM FLEXIBLE ENDOSCOPES.

## (undated) DEVICE — ENDOPATH PNEUMONEEDLE INSUFFLATION NEEDLES WITH LUER LOCK CONNECTORS 120MM: Brand: ENDOPATH

## (undated) DEVICE — Device: Brand: DEFENDO AIR/WATER/SUCTION AND BIOPSY VALVE

## (undated) DEVICE — TBG SMPL FLTR LINE NASL 02/C02 A/ BX/100

## (undated) DEVICE — SENSR O2 OXIMAX FNGR A/ 18IN NONSTR

## (undated) DEVICE — ENDOPATH XCEL WITH OPTIVIEW TECHNOLOGY DILATING TIP TROCARS WITH STABILITY SLEEVES: Brand: ENDOPATH XCEL OPTIVIEW

## (undated) DEVICE — ENDOPATH XCEL WITH OPTIVIEW TECHNOLOGY UNIVERSAL TROCAR STABILITY SLEEVES: Brand: ENDOPATH XCEL OPTIVIEW

## (undated) DEVICE — SUT VIC 0 UR6 27IN VCP603H

## (undated) DEVICE — FRCP BIOP ENDO CAPTURAPRO SPK SERR 2.8MM 230CM

## (undated) DEVICE — ENDOPATH XCEL DILATING TIP TROCARS WITH STABILITY SLEEVES: Brand: ENDOPATH XCEL

## (undated) DEVICE — CUFF,BP,DISP,1 TUBE,ADULT,HP: Brand: MEDLINE

## (undated) DEVICE — YANKAUER,BULB TIP WITH VENT: Brand: ARGYLE

## (undated) DEVICE — SNAR POLYP SENSATION MICRO OVL 13 240X40

## (undated) DEVICE — ANTIBACTERIAL UNDYED BRAIDED (POLYGLACTIN 910), SYNTHETIC ABSORBABLE SUTURE: Brand: COATED VICRYL

## (undated) DEVICE — TOWEL,OR,DSP,ST,BLUE,STD,4/PK,20PK/CS: Brand: MEDLINE

## (undated) DEVICE — MASK,OXYGEN,MED CONC,ADLT,7' TUB, UC: Brand: PENDING

## (undated) DEVICE — THE SINGLE USE ETRAP – POLYP TRAP IS USED FOR SUCTION RETRIEVAL OF ENDOSCOPICALLY REMOVED POLYPS.: Brand: ETRAP

## (undated) DEVICE — THE TALON GRASPING DEVICE IS USED TO RETRIEVE OF FOREIGN BODIES, TISSUE SPECIMENS, STONES OR CALCULI IN ENDOSCOPIC PROCEDURES OF THE GASTROINTESTINAL TRACT.: Brand: TALON

## (undated) DEVICE — LAPAROSCOPIC MONOPOLAR CORD: Brand: VALLEYLAB

## (undated) DEVICE — MONOPOLAR METZENBAUM SCISSOR, MINI BLADE TIP, DISPOSABLE: Brand: MONOPOLAR METZENBAUM SCISSOR, MINI BLADE TIP, DISPOSABLE

## (undated) DEVICE — APPL CHLORAPREP HI/LITE 26ML ORNG

## (undated) DEVICE — CONMED SCOPE SAVER BITE BLOCK, 20X27 MM: Brand: SCOPE SAVER

## (undated) DEVICE — 2, DISPOSABLE SUCTION/IRRIGATOR WITHOUT DISPOSABLE TIP: Brand: STRYKEFLOW

## (undated) DEVICE — PAD LAP CHOLE: Brand: MEDLINE INDUSTRIES, INC.